# Patient Record
Sex: FEMALE | Race: WHITE | NOT HISPANIC OR LATINO | Employment: OTHER | URBAN - METROPOLITAN AREA
[De-identification: names, ages, dates, MRNs, and addresses within clinical notes are randomized per-mention and may not be internally consistent; named-entity substitution may affect disease eponyms.]

---

## 2017-02-02 ENCOUNTER — HOSPITAL ENCOUNTER (OUTPATIENT)
Dept: LAB | Facility: MEDICAL CENTER | Age: 67
End: 2017-02-02
Attending: FAMILY MEDICINE
Payer: COMMERCIAL

## 2017-02-02 LAB
ALBUMIN SERPL BCP-MCNC: 4.2 G/DL (ref 3.2–4.9)
ALBUMIN/GLOB SERPL: 1.6 G/DL
ALP SERPL-CCNC: 68 U/L (ref 30–99)
ALT SERPL-CCNC: 16 U/L (ref 2–50)
ANION GAP SERPL CALC-SCNC: 4 MMOL/L (ref 0–11.9)
AST SERPL-CCNC: 17 U/L (ref 12–45)
BASOPHILS # BLD AUTO: 0.02 K/UL (ref 0–0.12)
BASOPHILS NFR BLD AUTO: 0.4 % (ref 0–1.8)
BILIRUB SERPL-MCNC: 1.2 MG/DL (ref 0.1–1.5)
BUN SERPL-MCNC: 18 MG/DL (ref 8–22)
CALCIUM SERPL-MCNC: 9.6 MG/DL (ref 8.5–10.5)
CHLORIDE SERPL-SCNC: 103 MMOL/L (ref 96–112)
CHOLEST SERPL-MCNC: 142 MG/DL (ref 100–199)
CO2 SERPL-SCNC: 30 MMOL/L (ref 20–33)
CREAT SERPL-MCNC: 0.72 MG/DL (ref 0.5–1.4)
EOSINOPHIL # BLD: 0.08 K/UL (ref 0–0.51)
EOSINOPHIL NFR BLD AUTO: 1.6 % (ref 0–6.9)
ERYTHROCYTE [DISTWIDTH] IN BLOOD BY AUTOMATED COUNT: 47.8 FL (ref 35.9–50)
EST. AVERAGE GLUCOSE BLD GHB EST-MCNC: 108 MG/DL
GLOBULIN SER CALC-MCNC: 2.6 G/DL (ref 1.9–3.5)
GLUCOSE SERPL-MCNC: 101 MG/DL (ref 65–99)
HBA1C MFR BLD: 5.4 % (ref 0–5.6)
HCT VFR BLD AUTO: 45.1 % (ref 37–47)
HDLC SERPL-MCNC: 51 MG/DL
HGB BLD-MCNC: 14.6 G/DL (ref 12–16)
IMM GRANULOCYTES # BLD AUTO: 0.01 K/UL (ref 0–0.11)
IMM GRANULOCYTES NFR BLD AUTO: 0.2 % (ref 0–0.9)
LDLC SERPL CALC-MCNC: 74 MG/DL
LYMPHOCYTES # BLD: 1.49 K/UL (ref 1–4.8)
LYMPHOCYTES NFR BLD AUTO: 30.2 % (ref 22–41)
MCH RBC QN AUTO: 33.1 PG (ref 27–33)
MCHC RBC AUTO-ENTMCNC: 32.4 G/DL (ref 33.6–35)
MCV RBC AUTO: 102.3 FL (ref 81.4–97.8)
MONOCYTES # BLD: 0.34 K/UL (ref 0–0.85)
MONOCYTES NFR BLD AUTO: 6.9 % (ref 0–13.4)
NEUTROPHILS # BLD: 2.99 K/UL (ref 2–7.15)
NEUTROPHILS NFR BLD AUTO: 60.7 % (ref 44–72)
NRBC # BLD AUTO: 0 K/UL
NRBC BLD-RTO: 0 /100 WBC
PLATELET # BLD AUTO: 177 K/UL (ref 164–446)
PMV BLD AUTO: 11.2 FL (ref 9–12.9)
POTASSIUM SERPL-SCNC: 4.2 MMOL/L (ref 3.6–5.5)
PROT SERPL-MCNC: 6.8 G/DL (ref 6–8.2)
RBC # BLD AUTO: 4.41 M/UL (ref 4.2–5.4)
SODIUM SERPL-SCNC: 137 MMOL/L (ref 135–145)
TRIGL SERPL-MCNC: 86 MG/DL (ref 0–149)
URATE SERPL-MCNC: 6.3 MG/DL (ref 1.9–8.2)
WBC # BLD AUTO: 4.9 K/UL (ref 4.8–10.8)

## 2017-02-02 PROCEDURE — 80053 COMPREHEN METABOLIC PANEL: CPT

## 2017-02-02 PROCEDURE — 36415 COLL VENOUS BLD VENIPUNCTURE: CPT

## 2017-02-02 PROCEDURE — 85025 COMPLETE CBC W/AUTO DIFF WBC: CPT

## 2017-02-02 PROCEDURE — 83036 HEMOGLOBIN GLYCOSYLATED A1C: CPT

## 2017-02-02 PROCEDURE — 80061 LIPID PANEL: CPT

## 2017-02-02 PROCEDURE — 84550 ASSAY OF BLOOD/URIC ACID: CPT

## 2017-06-30 ENCOUNTER — HOSPITAL ENCOUNTER (OUTPATIENT)
Dept: LAB | Facility: MEDICAL CENTER | Age: 67
End: 2017-06-30
Attending: FAMILY MEDICINE
Payer: COMMERCIAL

## 2017-06-30 LAB
ALBUMIN SERPL BCP-MCNC: 4.1 G/DL (ref 3.2–4.9)
ALBUMIN/GLOB SERPL: 1.5 G/DL
ALP SERPL-CCNC: 77 U/L (ref 30–99)
ALT SERPL-CCNC: 22 U/L (ref 2–50)
ANION GAP SERPL CALC-SCNC: 7 MMOL/L (ref 0–11.9)
AST SERPL-CCNC: 24 U/L (ref 12–45)
BILIRUB SERPL-MCNC: 2.2 MG/DL (ref 0.1–1.5)
BUN SERPL-MCNC: 13 MG/DL (ref 8–22)
CALCIUM SERPL-MCNC: 9.8 MG/DL (ref 8.5–10.5)
CHLORIDE SERPL-SCNC: 104 MMOL/L (ref 96–112)
CHOLEST SERPL-MCNC: 146 MG/DL (ref 100–199)
CO2 SERPL-SCNC: 30 MMOL/L (ref 20–33)
CREAT SERPL-MCNC: 0.8 MG/DL (ref 0.5–1.4)
EST. AVERAGE GLUCOSE BLD GHB EST-MCNC: 108 MG/DL
GFR SERPL CREATININE-BSD FRML MDRD: >60 ML/MIN/1.73 M 2
GLOBULIN SER CALC-MCNC: 2.8 G/DL (ref 1.9–3.5)
GLUCOSE SERPL-MCNC: 91 MG/DL (ref 65–99)
HBA1C MFR BLD: 5.4 % (ref 0–5.6)
HDLC SERPL-MCNC: 51 MG/DL
LDLC SERPL CALC-MCNC: 74 MG/DL
POTASSIUM SERPL-SCNC: 4.2 MMOL/L (ref 3.6–5.5)
PROT SERPL-MCNC: 6.9 G/DL (ref 6–8.2)
SODIUM SERPL-SCNC: 141 MMOL/L (ref 135–145)
TRIGL SERPL-MCNC: 107 MG/DL (ref 0–149)

## 2017-06-30 PROCEDURE — 83036 HEMOGLOBIN GLYCOSYLATED A1C: CPT

## 2017-06-30 PROCEDURE — 80053 COMPREHEN METABOLIC PANEL: CPT

## 2017-06-30 PROCEDURE — 36415 COLL VENOUS BLD VENIPUNCTURE: CPT

## 2017-06-30 PROCEDURE — 80061 LIPID PANEL: CPT

## 2017-09-26 ENCOUNTER — APPOINTMENT (OUTPATIENT)
Dept: SOCIAL WORK | Facility: CLINIC | Age: 67
End: 2017-09-26

## 2017-09-26 PROCEDURE — 90662 IIV NO PRSV INCREASED AG IM: CPT | Performed by: REGISTERED NURSE

## 2017-11-17 ENCOUNTER — HOSPITAL ENCOUNTER (OUTPATIENT)
Dept: RADIOLOGY | Facility: MEDICAL CENTER | Age: 67
End: 2017-11-17
Attending: FAMILY MEDICINE
Payer: COMMERCIAL

## 2017-11-17 DIAGNOSIS — Z12.39 SCREENING FOR MALIGNANT NEOPLASM OF BREAST: ICD-10-CM

## 2017-11-17 PROCEDURE — G0202 SCR MAMMO BI INCL CAD: HCPCS

## 2018-01-24 ENCOUNTER — HOSPITAL ENCOUNTER (OUTPATIENT)
Dept: LAB | Facility: MEDICAL CENTER | Age: 68
End: 2018-01-24
Attending: FAMILY MEDICINE
Payer: COMMERCIAL

## 2018-01-24 LAB
BASOPHILS # BLD AUTO: 0.5 % (ref 0–1.8)
BASOPHILS # BLD: 0.03 K/UL (ref 0–0.12)
EOSINOPHIL # BLD AUTO: 0.16 K/UL (ref 0–0.51)
EOSINOPHIL NFR BLD: 2.4 % (ref 0–6.9)
ERYTHROCYTE [DISTWIDTH] IN BLOOD BY AUTOMATED COUNT: 46.6 FL (ref 35.9–50)
EST. AVERAGE GLUCOSE BLD GHB EST-MCNC: 123 MG/DL
HBA1C MFR BLD: 5.9 % (ref 0–5.6)
HCT VFR BLD AUTO: 46.6 % (ref 37–47)
HGB BLD-MCNC: 15.6 G/DL (ref 12–16)
IMM GRANULOCYTES # BLD AUTO: 0.02 K/UL (ref 0–0.11)
IMM GRANULOCYTES NFR BLD AUTO: 0.3 % (ref 0–0.9)
LYMPHOCYTES # BLD AUTO: 1.84 K/UL (ref 1–4.8)
LYMPHOCYTES NFR BLD: 28 % (ref 22–41)
MCH RBC QN AUTO: 32.7 PG (ref 27–33)
MCHC RBC AUTO-ENTMCNC: 33.5 G/DL (ref 33.6–35)
MCV RBC AUTO: 97.7 FL (ref 81.4–97.8)
MONOCYTES # BLD AUTO: 0.45 K/UL (ref 0–0.85)
MONOCYTES NFR BLD AUTO: 6.8 % (ref 0–13.4)
NEUTROPHILS # BLD AUTO: 4.08 K/UL (ref 2–7.15)
NEUTROPHILS NFR BLD: 62 % (ref 44–72)
NRBC # BLD AUTO: 0 K/UL
NRBC BLD-RTO: 0 /100 WBC
PLATELET # BLD AUTO: 212 K/UL (ref 164–446)
PMV BLD AUTO: 10.3 FL (ref 9–12.9)
RBC # BLD AUTO: 4.77 M/UL (ref 4.2–5.4)
WBC # BLD AUTO: 6.6 K/UL (ref 4.8–10.8)

## 2018-01-24 PROCEDURE — 80053 COMPREHEN METABOLIC PANEL: CPT

## 2018-01-24 PROCEDURE — 85025 COMPLETE CBC W/AUTO DIFF WBC: CPT

## 2018-01-24 PROCEDURE — 36415 COLL VENOUS BLD VENIPUNCTURE: CPT

## 2018-01-24 PROCEDURE — 80061 LIPID PANEL: CPT

## 2018-01-24 PROCEDURE — 83036 HEMOGLOBIN GLYCOSYLATED A1C: CPT

## 2018-01-25 LAB
ALBUMIN SERPL BCP-MCNC: 3.8 G/DL (ref 3.2–4.9)
ALBUMIN/GLOB SERPL: 1.5 G/DL
ALP SERPL-CCNC: 59 U/L (ref 30–99)
ALT SERPL-CCNC: 23 U/L (ref 2–50)
ANION GAP SERPL CALC-SCNC: 10 MMOL/L (ref 0–11.9)
AST SERPL-CCNC: 30 U/L (ref 12–45)
BILIRUB SERPL-MCNC: 1.5 MG/DL (ref 0.1–1.5)
BUN SERPL-MCNC: 19 MG/DL (ref 8–22)
CALCIUM SERPL-MCNC: 9.2 MG/DL (ref 8.4–10.2)
CHLORIDE SERPL-SCNC: 106 MMOL/L (ref 96–112)
CHOLEST SERPL-MCNC: 186 MG/DL (ref 100–199)
CO2 SERPL-SCNC: 25 MMOL/L (ref 20–33)
CREAT SERPL-MCNC: 0.9 MG/DL (ref 0.5–1.4)
GLOBULIN SER CALC-MCNC: 2.6 G/DL (ref 1.9–3.5)
GLUCOSE SERPL-MCNC: 96 MG/DL (ref 65–99)
HDLC SERPL-MCNC: 54 MG/DL
LDLC SERPL CALC-MCNC: 104 MG/DL
POTASSIUM SERPL-SCNC: 4.2 MMOL/L (ref 3.6–5.5)
PROT SERPL-MCNC: 6.4 G/DL (ref 6–8.2)
SODIUM SERPL-SCNC: 141 MMOL/L (ref 135–145)
TRIGL SERPL-MCNC: 138 MG/DL (ref 0–149)

## 2018-03-21 ENCOUNTER — HOSPITAL ENCOUNTER (OUTPATIENT)
Dept: RADIOLOGY | Facility: MEDICAL CENTER | Age: 68
End: 2018-03-21
Attending: SPECIALIST
Payer: COMMERCIAL

## 2018-03-21 ENCOUNTER — HOSPITAL ENCOUNTER (OUTPATIENT)
Dept: LAB | Facility: MEDICAL CENTER | Age: 68
End: 2018-03-21
Attending: SPECIALIST
Payer: COMMERCIAL

## 2018-03-21 DIAGNOSIS — M48.02 SPINAL STENOSIS IN CERVICAL REGION: ICD-10-CM

## 2018-03-21 LAB
ALBUMIN SERPL BCP-MCNC: 4 G/DL (ref 3.2–4.9)
ALBUMIN/GLOB SERPL: 1.5 G/DL
ALP SERPL-CCNC: 60 U/L (ref 30–99)
ALT SERPL-CCNC: 23 U/L (ref 2–50)
ANION GAP SERPL CALC-SCNC: 8 MMOL/L (ref 0–11.9)
AST SERPL-CCNC: 25 U/L (ref 12–45)
BASOPHILS # BLD AUTO: 0.4 % (ref 0–1.8)
BASOPHILS # BLD: 0.03 K/UL (ref 0–0.12)
BILIRUB SERPL-MCNC: 1.1 MG/DL (ref 0.1–1.5)
BUN SERPL-MCNC: 32 MG/DL (ref 8–22)
CALCIUM SERPL-MCNC: 9.4 MG/DL (ref 8.5–10.5)
CHLORIDE SERPL-SCNC: 108 MMOL/L (ref 96–112)
CO2 SERPL-SCNC: 25 MMOL/L (ref 20–33)
CREAT SERPL-MCNC: 0.8 MG/DL (ref 0.5–1.4)
EOSINOPHIL # BLD AUTO: 0.14 K/UL (ref 0–0.51)
EOSINOPHIL NFR BLD: 1.8 % (ref 0–6.9)
ERYTHROCYTE [DISTWIDTH] IN BLOOD BY AUTOMATED COUNT: 48.6 FL (ref 35.9–50)
ERYTHROCYTE [SEDIMENTATION RATE] IN BLOOD BY WESTERGREN METHOD: 8 MM/HOUR (ref 0–30)
GLOBULIN SER CALC-MCNC: 2.7 G/DL (ref 1.9–3.5)
GLUCOSE SERPL-MCNC: 115 MG/DL (ref 65–99)
HCT VFR BLD AUTO: 48.3 % (ref 37–47)
HGB BLD-MCNC: 16.1 G/DL (ref 12–16)
IMM GRANULOCYTES # BLD AUTO: 0.01 K/UL (ref 0–0.11)
IMM GRANULOCYTES NFR BLD AUTO: 0.1 % (ref 0–0.9)
LYMPHOCYTES # BLD AUTO: 1.82 K/UL (ref 1–4.8)
LYMPHOCYTES NFR BLD: 22.9 % (ref 22–41)
MCH RBC QN AUTO: 32.8 PG (ref 27–33)
MCHC RBC AUTO-ENTMCNC: 33.3 G/DL (ref 33.6–35)
MCV RBC AUTO: 98.4 FL (ref 81.4–97.8)
MONOCYTES # BLD AUTO: 0.57 K/UL (ref 0–0.85)
MONOCYTES NFR BLD AUTO: 7.2 % (ref 0–13.4)
NEUTROPHILS # BLD AUTO: 5.37 K/UL (ref 2–7.15)
NEUTROPHILS NFR BLD: 67.6 % (ref 44–72)
NRBC # BLD AUTO: 0 K/UL
NRBC BLD-RTO: 0 /100 WBC
PLATELET # BLD AUTO: 227 K/UL (ref 164–446)
PMV BLD AUTO: 10.8 FL (ref 9–12.9)
POTASSIUM SERPL-SCNC: 4.2 MMOL/L (ref 3.6–5.5)
PROT SERPL-MCNC: 6.7 G/DL (ref 6–8.2)
RBC # BLD AUTO: 4.91 M/UL (ref 4.2–5.4)
SODIUM SERPL-SCNC: 141 MMOL/L (ref 135–145)
WBC # BLD AUTO: 7.9 K/UL (ref 4.8–10.8)

## 2018-03-21 PROCEDURE — 80053 COMPREHEN METABOLIC PANEL: CPT

## 2018-03-21 PROCEDURE — 36415 COLL VENOUS BLD VENIPUNCTURE: CPT

## 2018-03-21 PROCEDURE — 85652 RBC SED RATE AUTOMATED: CPT

## 2018-03-21 PROCEDURE — 700117 HCHG RX CONTRAST REV CODE 255: Performed by: SPECIALIST

## 2018-03-21 PROCEDURE — A9579 GAD-BASE MR CONTRAST NOS,1ML: HCPCS | Performed by: SPECIALIST

## 2018-03-21 PROCEDURE — 72156 MRI NECK SPINE W/O & W/DYE: CPT

## 2018-03-21 PROCEDURE — 85025 COMPLETE CBC W/AUTO DIFF WBC: CPT

## 2018-03-21 RX ADMIN — GADODIAMIDE 20 ML: 287 INJECTION INTRAVENOUS at 14:06

## 2018-08-07 ENCOUNTER — HOSPITAL ENCOUNTER (OUTPATIENT)
Dept: LAB | Facility: MEDICAL CENTER | Age: 68
End: 2018-08-07
Attending: FAMILY MEDICINE
Payer: COMMERCIAL

## 2018-08-07 LAB
ALBUMIN SERPL BCP-MCNC: 4.2 G/DL (ref 3.2–4.9)
ALBUMIN/GLOB SERPL: 1.7 G/DL
ALP SERPL-CCNC: 66 U/L (ref 30–99)
ALT SERPL-CCNC: 26 U/L (ref 2–50)
ANION GAP SERPL CALC-SCNC: 10 MMOL/L (ref 0–11.9)
AST SERPL-CCNC: 26 U/L (ref 12–45)
BILIRUB SERPL-MCNC: 1.6 MG/DL (ref 0.1–1.5)
BUN SERPL-MCNC: 15 MG/DL (ref 8–22)
CALCIUM SERPL-MCNC: 9.1 MG/DL (ref 8.5–10.5)
CHLORIDE SERPL-SCNC: 104 MMOL/L (ref 96–112)
CHOLEST SERPL-MCNC: 152 MG/DL (ref 100–199)
CO2 SERPL-SCNC: 26 MMOL/L (ref 20–33)
CREAT SERPL-MCNC: 0.72 MG/DL (ref 0.5–1.4)
EST. AVERAGE GLUCOSE BLD GHB EST-MCNC: 123 MG/DL
GLOBULIN SER CALC-MCNC: 2.5 G/DL (ref 1.9–3.5)
GLUCOSE SERPL-MCNC: 90 MG/DL (ref 65–99)
HBA1C MFR BLD: 5.9 % (ref 0–5.6)
HDLC SERPL-MCNC: 50 MG/DL
LDLC SERPL CALC-MCNC: 73 MG/DL
POTASSIUM SERPL-SCNC: 3.6 MMOL/L (ref 3.6–5.5)
PROT SERPL-MCNC: 6.7 G/DL (ref 6–8.2)
SODIUM SERPL-SCNC: 140 MMOL/L (ref 135–145)
TRIGL SERPL-MCNC: 143 MG/DL (ref 0–149)

## 2018-08-07 PROCEDURE — 83036 HEMOGLOBIN GLYCOSYLATED A1C: CPT

## 2018-08-07 PROCEDURE — 80061 LIPID PANEL: CPT

## 2018-08-07 PROCEDURE — 36415 COLL VENOUS BLD VENIPUNCTURE: CPT

## 2018-08-07 PROCEDURE — 80053 COMPREHEN METABOLIC PANEL: CPT

## 2018-09-19 ENCOUNTER — IMMUNIZATION (OUTPATIENT)
Dept: OCCUPATIONAL MEDICINE | Facility: CLINIC | Age: 68
End: 2018-09-19

## 2018-09-19 DIAGNOSIS — Z23 NEED FOR VACCINATION: ICD-10-CM

## 2018-09-19 PROCEDURE — 90686 IIV4 VACC NO PRSV 0.5 ML IM: CPT | Performed by: PREVENTIVE MEDICINE

## 2018-12-15 ENCOUNTER — HOSPITAL ENCOUNTER (OUTPATIENT)
Dept: LAB | Facility: MEDICAL CENTER | Age: 68
End: 2018-12-15
Attending: FAMILY MEDICINE
Payer: COMMERCIAL

## 2018-12-15 LAB
BASOPHILS # BLD AUTO: 0.3 % (ref 0–1.8)
BASOPHILS # BLD: 0.02 K/UL (ref 0–0.12)
EOSINOPHIL # BLD AUTO: 0.14 K/UL (ref 0–0.51)
EOSINOPHIL NFR BLD: 2.2 % (ref 0–6.9)
ERYTHROCYTE [DISTWIDTH] IN BLOOD BY AUTOMATED COUNT: 47.5 FL (ref 35.9–50)
HCT VFR BLD AUTO: 48.6 % (ref 37–47)
HGB BLD-MCNC: 16.1 G/DL (ref 12–16)
IMM GRANULOCYTES # BLD AUTO: 0.02 K/UL (ref 0–0.11)
IMM GRANULOCYTES NFR BLD AUTO: 0.3 % (ref 0–0.9)
LYMPHOCYTES # BLD AUTO: 1.87 K/UL (ref 1–4.8)
LYMPHOCYTES NFR BLD: 29.5 % (ref 22–41)
MCH RBC QN AUTO: 32.1 PG (ref 27–33)
MCHC RBC AUTO-ENTMCNC: 33.1 G/DL (ref 33.6–35)
MCV RBC AUTO: 96.8 FL (ref 81.4–97.8)
MONOCYTES # BLD AUTO: 0.5 K/UL (ref 0–0.85)
MONOCYTES NFR BLD AUTO: 7.9 % (ref 0–13.4)
NEUTROPHILS # BLD AUTO: 3.79 K/UL (ref 2–7.15)
NEUTROPHILS NFR BLD: 59.8 % (ref 44–72)
NRBC # BLD AUTO: 0 K/UL
NRBC BLD-RTO: 0 /100 WBC
PLATELET # BLD AUTO: 213 K/UL (ref 164–446)
PMV BLD AUTO: 11.3 FL (ref 9–12.9)
RBC # BLD AUTO: 5.02 M/UL (ref 4.2–5.4)
WBC # BLD AUTO: 6.3 K/UL (ref 4.8–10.8)

## 2018-12-15 PROCEDURE — 80061 LIPID PANEL: CPT

## 2018-12-15 PROCEDURE — 85025 COMPLETE CBC W/AUTO DIFF WBC: CPT

## 2018-12-15 PROCEDURE — 36415 COLL VENOUS BLD VENIPUNCTURE: CPT

## 2018-12-15 PROCEDURE — 83036 HEMOGLOBIN GLYCOSYLATED A1C: CPT

## 2018-12-15 PROCEDURE — 80053 COMPREHEN METABOLIC PANEL: CPT

## 2018-12-15 PROCEDURE — 84550 ASSAY OF BLOOD/URIC ACID: CPT

## 2018-12-16 LAB
ALBUMIN SERPL BCP-MCNC: 4.1 G/DL (ref 3.2–4.9)
ALBUMIN/GLOB SERPL: 1.6 G/DL
ALP SERPL-CCNC: 70 U/L (ref 30–99)
ALT SERPL-CCNC: 21 U/L (ref 2–50)
ANION GAP SERPL CALC-SCNC: 8 MMOL/L (ref 0–11.9)
AST SERPL-CCNC: 22 U/L (ref 12–45)
BILIRUB SERPL-MCNC: 1.9 MG/DL (ref 0.1–1.5)
BUN SERPL-MCNC: 13 MG/DL (ref 8–22)
CALCIUM SERPL-MCNC: 9.7 MG/DL (ref 8.5–10.5)
CHLORIDE SERPL-SCNC: 103 MMOL/L (ref 96–112)
CHOLEST SERPL-MCNC: 129 MG/DL (ref 100–199)
CO2 SERPL-SCNC: 28 MMOL/L (ref 20–33)
CREAT SERPL-MCNC: 0.83 MG/DL (ref 0.5–1.4)
FASTING STATUS PATIENT QL REPORTED: NORMAL
GLOBULIN SER CALC-MCNC: 2.5 G/DL (ref 1.9–3.5)
GLUCOSE SERPL-MCNC: 90 MG/DL (ref 65–99)
HDLC SERPL-MCNC: 47 MG/DL
LDLC SERPL CALC-MCNC: 60 MG/DL
POTASSIUM SERPL-SCNC: 3.7 MMOL/L (ref 3.6–5.5)
PROT SERPL-MCNC: 6.6 G/DL (ref 6–8.2)
SODIUM SERPL-SCNC: 139 MMOL/L (ref 135–145)
TRIGL SERPL-MCNC: 109 MG/DL (ref 0–149)
URATE SERPL-MCNC: 7.9 MG/DL (ref 1.9–8.2)

## 2018-12-18 ENCOUNTER — HOSPITAL ENCOUNTER (OUTPATIENT)
Dept: RADIOLOGY | Facility: MEDICAL CENTER | Age: 68
End: 2018-12-18
Attending: FAMILY MEDICINE
Payer: COMMERCIAL

## 2018-12-18 DIAGNOSIS — Z12.31 VISIT FOR SCREENING MAMMOGRAM: ICD-10-CM

## 2018-12-18 LAB
EST. AVERAGE GLUCOSE BLD GHB EST-MCNC: 134 MG/DL
HBA1C MFR BLD: 6.3 % (ref 0–5.6)

## 2018-12-18 PROCEDURE — 77067 SCR MAMMO BI INCL CAD: CPT

## 2019-01-10 ENCOUNTER — HOSPITAL ENCOUNTER (OUTPATIENT)
Dept: RADIOLOGY | Facility: MEDICAL CENTER | Age: 69
End: 2019-01-10
Attending: FAMILY MEDICINE
Payer: MEDICARE

## 2019-01-10 DIAGNOSIS — I65.23 BILATERAL CAROTID ARTERY STENOSIS: ICD-10-CM

## 2019-01-10 PROCEDURE — 93880 EXTRACRANIAL BILAT STUDY: CPT

## 2019-03-27 ENCOUNTER — HOSPITAL ENCOUNTER (OUTPATIENT)
Dept: LAB | Facility: MEDICAL CENTER | Age: 69
End: 2019-03-27
Attending: FAMILY MEDICINE
Payer: MEDICARE

## 2019-03-27 LAB
ALBUMIN SERPL BCP-MCNC: 4 G/DL (ref 3.2–4.9)
ALBUMIN/GLOB SERPL: 1.3 G/DL
ALP SERPL-CCNC: 63 U/L (ref 30–99)
ALT SERPL-CCNC: 20 U/L (ref 2–50)
ANION GAP SERPL CALC-SCNC: 9 MMOL/L (ref 0–11.9)
AST SERPL-CCNC: 22 U/L (ref 12–45)
BILIRUB SERPL-MCNC: 1.6 MG/DL (ref 0.1–1.5)
BUN SERPL-MCNC: 21 MG/DL (ref 8–22)
CALCIUM SERPL-MCNC: 9.3 MG/DL (ref 8.5–10.5)
CHLORIDE SERPL-SCNC: 104 MMOL/L (ref 96–112)
CHOLEST SERPL-MCNC: 158 MG/DL (ref 100–199)
CO2 SERPL-SCNC: 28 MMOL/L (ref 20–33)
CREAT SERPL-MCNC: 0.89 MG/DL (ref 0.5–1.4)
FASTING STATUS PATIENT QL REPORTED: NORMAL
GLOBULIN SER CALC-MCNC: 3.1 G/DL (ref 1.9–3.5)
GLUCOSE SERPL-MCNC: 104 MG/DL (ref 65–99)
HDLC SERPL-MCNC: 46 MG/DL
LDLC SERPL CALC-MCNC: 84 MG/DL
POTASSIUM SERPL-SCNC: 4.1 MMOL/L (ref 3.6–5.5)
PROT SERPL-MCNC: 7.1 G/DL (ref 6–8.2)
SODIUM SERPL-SCNC: 141 MMOL/L (ref 135–145)
TRIGL SERPL-MCNC: 140 MG/DL (ref 0–149)

## 2019-03-27 PROCEDURE — 80053 COMPREHEN METABOLIC PANEL: CPT

## 2019-03-27 PROCEDURE — 36415 COLL VENOUS BLD VENIPUNCTURE: CPT

## 2019-03-27 PROCEDURE — 83036 HEMOGLOBIN GLYCOSYLATED A1C: CPT | Mod: GA

## 2019-03-27 PROCEDURE — 80061 LIPID PANEL: CPT

## 2019-03-29 LAB
EST. AVERAGE GLUCOSE BLD GHB EST-MCNC: 126 MG/DL
HBA1C MFR BLD: 6 % (ref 0–5.6)

## 2019-04-09 ENCOUNTER — HOSPITAL ENCOUNTER (OUTPATIENT)
Dept: RADIOLOGY | Facility: MEDICAL CENTER | Age: 69
End: 2019-04-09
Attending: FAMILY MEDICINE
Payer: MEDICARE

## 2019-04-09 DIAGNOSIS — M25.551 RIGHT HIP PAIN: ICD-10-CM

## 2019-04-09 DIAGNOSIS — R10.31 RIGHT INGUINAL PAIN: ICD-10-CM

## 2019-04-09 PROCEDURE — 73562 X-RAY EXAM OF KNEE 3: CPT | Mod: RT

## 2019-04-09 PROCEDURE — 73502 X-RAY EXAM HIP UNI 2-3 VIEWS: CPT | Mod: RT

## 2019-06-06 ENCOUNTER — HOSPITAL ENCOUNTER (OUTPATIENT)
Dept: LAB | Facility: MEDICAL CENTER | Age: 69
End: 2019-06-06
Attending: FAMILY MEDICINE
Payer: MEDICARE

## 2019-06-06 LAB
BASOPHILS # BLD AUTO: 0.6 % (ref 0–1.8)
BASOPHILS # BLD: 0.04 K/UL (ref 0–0.12)
CRP SERPL HS-MCNC: 0.31 MG/DL (ref 0–0.75)
EOSINOPHIL # BLD AUTO: 0.16 K/UL (ref 0–0.51)
EOSINOPHIL NFR BLD: 2.5 % (ref 0–6.9)
ERYTHROCYTE [DISTWIDTH] IN BLOOD BY AUTOMATED COUNT: 48.7 FL (ref 35.9–50)
ERYTHROCYTE [SEDIMENTATION RATE] IN BLOOD BY WESTERGREN METHOD: 9 MM/HOUR (ref 0–30)
HCT VFR BLD AUTO: 46.8 % (ref 37–47)
HGB BLD-MCNC: 16 G/DL (ref 12–16)
IMM GRANULOCYTES # BLD AUTO: 0.01 K/UL (ref 0–0.11)
IMM GRANULOCYTES NFR BLD AUTO: 0.2 % (ref 0–0.9)
LYMPHOCYTES # BLD AUTO: 1.78 K/UL (ref 1–4.8)
LYMPHOCYTES NFR BLD: 28 % (ref 22–41)
MCH RBC QN AUTO: 33.3 PG (ref 27–33)
MCHC RBC AUTO-ENTMCNC: 34.2 G/DL (ref 33.6–35)
MCV RBC AUTO: 97.3 FL (ref 81.4–97.8)
MONOCYTES # BLD AUTO: 0.44 K/UL (ref 0–0.85)
MONOCYTES NFR BLD AUTO: 6.9 % (ref 0–13.4)
NEUTROPHILS # BLD AUTO: 3.93 K/UL (ref 2–7.15)
NEUTROPHILS NFR BLD: 61.8 % (ref 44–72)
NRBC # BLD AUTO: 0 K/UL
NRBC BLD-RTO: 0 /100 WBC
PLATELET # BLD AUTO: 221 K/UL (ref 164–446)
PMV BLD AUTO: 10.9 FL (ref 9–12.9)
RBC # BLD AUTO: 4.81 M/UL (ref 4.2–5.4)
WBC # BLD AUTO: 6.4 K/UL (ref 4.8–10.8)

## 2019-06-06 PROCEDURE — 85652 RBC SED RATE AUTOMATED: CPT

## 2019-06-06 PROCEDURE — 86140 C-REACTIVE PROTEIN: CPT

## 2019-06-06 PROCEDURE — 85025 COMPLETE CBC W/AUTO DIFF WBC: CPT

## 2019-06-06 PROCEDURE — 36415 COLL VENOUS BLD VENIPUNCTURE: CPT

## 2019-06-12 ENCOUNTER — HOSPITAL ENCOUNTER (OUTPATIENT)
Dept: RADIOLOGY | Facility: MEDICAL CENTER | Age: 69
End: 2019-06-12
Attending: FAMILY MEDICINE
Payer: MEDICARE

## 2019-06-12 DIAGNOSIS — N64.59 BREAST SIGNS AND SYMPTOMS: ICD-10-CM

## 2019-06-12 PROCEDURE — G0279 TOMOSYNTHESIS, MAMMO: HCPCS | Mod: RT

## 2019-06-25 ENCOUNTER — HOSPITAL ENCOUNTER (OUTPATIENT)
Dept: LAB | Facility: MEDICAL CENTER | Age: 69
End: 2019-06-25
Attending: FAMILY MEDICINE
Payer: MEDICARE

## 2019-06-25 LAB
ALBUMIN SERPL BCP-MCNC: 4.1 G/DL (ref 3.2–4.9)
ALBUMIN/GLOB SERPL: 1.6 G/DL
ALP SERPL-CCNC: 64 U/L (ref 30–99)
ALT SERPL-CCNC: 17 U/L (ref 2–50)
ANION GAP SERPL CALC-SCNC: 6 MMOL/L (ref 0–11.9)
AST SERPL-CCNC: 22 U/L (ref 12–45)
BILIRUB SERPL-MCNC: 1.5 MG/DL (ref 0.1–1.5)
BUN SERPL-MCNC: 16 MG/DL (ref 8–22)
CALCIUM SERPL-MCNC: 9.5 MG/DL (ref 8.5–10.5)
CHLORIDE SERPL-SCNC: 107 MMOL/L (ref 96–112)
CHOLEST SERPL-MCNC: 139 MG/DL (ref 100–199)
CO2 SERPL-SCNC: 27 MMOL/L (ref 20–33)
CREAT SERPL-MCNC: 0.78 MG/DL (ref 0.5–1.4)
EST. AVERAGE GLUCOSE BLD GHB EST-MCNC: 111 MG/DL
FASTING STATUS PATIENT QL REPORTED: NORMAL
GLOBULIN SER CALC-MCNC: 2.5 G/DL (ref 1.9–3.5)
GLUCOSE SERPL-MCNC: 98 MG/DL (ref 65–99)
HBA1C MFR BLD: 5.5 % (ref 0–5.6)
HDLC SERPL-MCNC: 39 MG/DL
LDLC SERPL CALC-MCNC: 75 MG/DL
POTASSIUM SERPL-SCNC: 3.9 MMOL/L (ref 3.6–5.5)
PROT SERPL-MCNC: 6.6 G/DL (ref 6–8.2)
SODIUM SERPL-SCNC: 140 MMOL/L (ref 135–145)
TRIGL SERPL-MCNC: 127 MG/DL (ref 0–149)
TSH SERPL DL<=0.005 MIU/L-ACNC: 1.04 UIU/ML (ref 0.38–5.33)

## 2019-06-25 PROCEDURE — 84443 ASSAY THYROID STIM HORMONE: CPT

## 2019-06-25 PROCEDURE — 80061 LIPID PANEL: CPT

## 2019-06-25 PROCEDURE — 83036 HEMOGLOBIN GLYCOSYLATED A1C: CPT | Mod: GA

## 2019-06-25 PROCEDURE — 80053 COMPREHEN METABOLIC PANEL: CPT

## 2019-06-25 PROCEDURE — 36415 COLL VENOUS BLD VENIPUNCTURE: CPT

## 2019-10-05 ENCOUNTER — HOSPITAL ENCOUNTER (OUTPATIENT)
Dept: LAB | Facility: MEDICAL CENTER | Age: 69
End: 2019-10-05
Attending: FAMILY MEDICINE
Payer: MEDICARE

## 2019-10-05 LAB
ALBUMIN SERPL BCP-MCNC: 4.3 G/DL (ref 3.2–4.9)
ALBUMIN/GLOB SERPL: 1.6 G/DL
ALP SERPL-CCNC: 63 U/L (ref 30–99)
ALT SERPL-CCNC: 23 U/L (ref 2–50)
ANION GAP SERPL CALC-SCNC: 9 MMOL/L (ref 0–11.9)
AST SERPL-CCNC: 26 U/L (ref 12–45)
BILIRUB SERPL-MCNC: 1.6 MG/DL (ref 0.1–1.5)
BUN SERPL-MCNC: 25 MG/DL (ref 8–22)
CALCIUM SERPL-MCNC: 9.8 MG/DL (ref 8.5–10.5)
CHLORIDE SERPL-SCNC: 105 MMOL/L (ref 96–112)
CHOLEST SERPL-MCNC: 178 MG/DL (ref 100–199)
CO2 SERPL-SCNC: 25 MMOL/L (ref 20–33)
CREAT SERPL-MCNC: 0.75 MG/DL (ref 0.5–1.4)
EST. AVERAGE GLUCOSE BLD GHB EST-MCNC: 120 MG/DL
GLOBULIN SER CALC-MCNC: 2.7 G/DL (ref 1.9–3.5)
GLUCOSE SERPL-MCNC: 85 MG/DL (ref 65–99)
HBA1C MFR BLD: 5.8 % (ref 0–5.6)
HDLC SERPL-MCNC: 46 MG/DL
LDLC SERPL CALC-MCNC: 100 MG/DL
POTASSIUM SERPL-SCNC: 3.8 MMOL/L (ref 3.6–5.5)
PROT SERPL-MCNC: 7 G/DL (ref 6–8.2)
SODIUM SERPL-SCNC: 139 MMOL/L (ref 135–145)
TRIGL SERPL-MCNC: 162 MG/DL (ref 0–149)

## 2019-10-05 PROCEDURE — 80061 LIPID PANEL: CPT

## 2019-10-05 PROCEDURE — 80053 COMPREHEN METABOLIC PANEL: CPT

## 2019-10-05 PROCEDURE — 36415 COLL VENOUS BLD VENIPUNCTURE: CPT

## 2019-10-05 PROCEDURE — 83036 HEMOGLOBIN GLYCOSYLATED A1C: CPT

## 2019-10-22 ENCOUNTER — HOSPITAL ENCOUNTER (OUTPATIENT)
Dept: RADIOLOGY | Facility: MEDICAL CENTER | Age: 69
End: 2019-10-22
Attending: FAMILY MEDICINE
Payer: MEDICARE

## 2019-10-22 DIAGNOSIS — R10.30 INGUINAL PAIN, UNSPECIFIED LATERALITY: ICD-10-CM

## 2019-10-22 DIAGNOSIS — M25.562 LEFT KNEE PAIN, UNSPECIFIED CHRONICITY: ICD-10-CM

## 2019-10-22 PROCEDURE — 72100 X-RAY EXAM L-S SPINE 2/3 VWS: CPT

## 2019-10-22 PROCEDURE — 73562 X-RAY EXAM OF KNEE 3: CPT | Mod: LT

## 2019-10-23 ENCOUNTER — NON-PROVIDER VISIT (OUTPATIENT)
Dept: CARDIOLOGY | Facility: MEDICAL CENTER | Age: 69
End: 2019-10-23
Payer: MEDICARE

## 2019-10-23 ENCOUNTER — OFFICE VISIT (OUTPATIENT)
Dept: CARDIOLOGY | Facility: MEDICAL CENTER | Age: 69
End: 2019-10-23
Payer: MEDICARE

## 2019-10-23 VITALS
SYSTOLIC BLOOD PRESSURE: 148 MMHG | OXYGEN SATURATION: 93 % | WEIGHT: 222 LBS | HEIGHT: 65 IN | HEART RATE: 92 BPM | DIASTOLIC BLOOD PRESSURE: 80 MMHG | BODY MASS INDEX: 36.99 KG/M2

## 2019-10-23 DIAGNOSIS — Z86.79 HISTORY OF SINUS TACHYCARDIA: ICD-10-CM

## 2019-10-23 DIAGNOSIS — R00.1 BRADYCARDIA: ICD-10-CM

## 2019-10-23 DIAGNOSIS — I10 HYPERTENSION, UNSPECIFIED TYPE: ICD-10-CM

## 2019-10-23 LAB — EKG IMPRESSION: NORMAL

## 2019-10-23 PROCEDURE — 93000 ELECTROCARDIOGRAM COMPLETE: CPT | Performed by: INTERNAL MEDICINE

## 2019-10-23 PROCEDURE — 99204 OFFICE O/P NEW MOD 45 MIN: CPT | Performed by: INTERNAL MEDICINE

## 2019-10-23 PROCEDURE — 93224 XTRNL ECG REC UP TO 48 HRS: CPT | Performed by: INTERNAL MEDICINE

## 2019-10-23 RX ORDER — TRANDOLAPRIL TABLETS 4 MG/1
TABLET ORAL
Refills: 0 | COMMUNITY
Start: 2019-07-26 | End: 2023-02-15 | Stop reason: SDUPTHER

## 2019-10-23 RX ORDER — ATORVASTATIN CALCIUM 10 MG/1
TABLET, FILM COATED ORAL
COMMUNITY
Start: 2016-12-19 | End: 2019-10-23

## 2019-10-23 ASSESSMENT — ENCOUNTER SYMPTOMS
HEARTBURN: 0
FEVER: 0
DEPRESSION: 0
PALPITATIONS: 1
EYE DISCHARGE: 0
NERVOUS/ANXIOUS: 0
COUGH: 0
PND: 0
MYALGIAS: 0
BRUISES/BLEEDS EASILY: 0
NAUSEA: 0
HEADACHES: 0
BLURRED VISION: 0
DIZZINESS: 0
CHILLS: 0
SHORTNESS OF BREATH: 0

## 2019-10-23 NOTE — PROGRESS NOTES
"Chief Complaint   Patient presents with   • Tachycardia       Subjective:   Fiona Daniels is a 69 y.o. female who presents today in consultation at the request of Dr. Rodríguez for tendency for rapid heart rate.    This patient is noticed heart rates of 100 at rest or up to 130 with minimal physical activity for about 1 year.  She is minimally bothered by this chief complaint.  She used to walk and hike 2 miles a day but in the last year has not done it due to plantar fasciitis.    She has no history of heart disease.  She is treated for hypertension with low-dose hydrochlorothiazide and ACE inhibitor.    No history of snoring or daytime somnolence.    Recent lab work demonstrates a normal CBC in June 2019 and a TSH more recently which was normal.  She tends to have a borderline high total bilirubin probably due to Guilbert syndrome.    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    Past medical history: Treated hypertension and treated hyperlipidemia.  Most recent LDL cholesterol was 75.  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  Her  has sleep apnea.  No tobacco.  No alcohol abuse.  No illicit drugs.      Office EKG today demonstrates sinus rhythm and a heart rate of 93 and is normal.    Past Medical History:   Diagnosis Date   • Anesthesia     mother  \"dead for a minute\"   • ASTHMA    • Hepatitis A     age 6   • Hypertension      Past Surgical History:   Procedure Laterality Date   • ETHMOIDECTOMY  2/14/2012    Performed by GÓMEZ VALDERRAMA at SURGERY SAME DAY [facility] ORS   • NASAL POLYPECTOMY  2/14/2012    Performed by GÓMEZ VALDERRAMA at SURGERY SAME DAY [facility]   • NASAL SEPTAL RECONSTRUCTION  2/14/2012    [facility]   • OTHER ORTHOPEDIC SURGERY      ORIF right wrist     No family history on file.  Social History     Socioeconomic History   • Marital status:      Spouse name: Not on file "   • Number of children: Not on file   • Years of education: Not on file   • Highest education level: Not on file   Occupational History   • Not on file   Social Needs   • Financial resource strain: Not on file   • Food insecurity:     Worry: Not on file     Inability: Not on file   • Transportation needs:     Medical: Not on file     Non-medical: Not on file   Tobacco Use   • Smoking status: Never Smoker   • Smokeless tobacco: Never Used   Substance and Sexual Activity   • Alcohol use: No   • Drug use: No   • Sexual activity: Not on file   Lifestyle   • Physical activity:     Days per week: Not on file     Minutes per session: Not on file   • Stress: Not on file   Relationships   • Social connections:     Talks on phone: Not on file     Gets together: Not on file     Attends Yazidism service: Not on file     Active member of club or organization: Not on file     Attends meetings of clubs or organizations: Not on file     Relationship status: Not on file   • Intimate partner violence:     Fear of current or ex partner: Not on file     Emotionally abused: Not on file     Physically abused: Not on file     Forced sexual activity: Not on file   Other Topics Concern   • Not on file   Social History Narrative   • Not on file     Allergies   Allergen Reactions   • Aspirin Anaphylaxis   • Codeine    • Moxifloxacin Hcl In Nacl Rash   • Robitussin Dm [Guiatuss Dm] Anaphylaxis     Outpatient Encounter Medications as of 10/23/2019   Medication Sig Dispense Refill   • trandolapril (MAVIK) 4 MG Tab TK 1 T PO  BID  0   • hydrochlorothiazide (HYDRODIURIL) 12.5 MG tablet Take 12.5 mg by mouth every day.     • albuterol (VENTOLIN OR PROVENTIL) 108 (90 BASE) MCG/ACT AERS Inhale 2 Puffs by mouth every four hours as needed for Shortness of Breath. 1 Inhaler 3   • fluticasone-salmeterol (ADVAIR DISKUS) 100-50 MCG/DOSE AEPB Inhale 1 Puff by mouth every day.     • atorvastatin (LIPITOR) 10 MG TABS Take 20 mg by mouth every evening.     •  "Multiple Vitamins-Minerals (ONE-A-DAY 50 PLUS PO) Take  by mouth every day.     • Cholecalciferol (VITAMIN D3 PO) Take  by mouth every day.     • CALCIUM PO Take  by mouth every day. Pt does not know dose     • [DISCONTINUED] Influenza Vac Tiss-Cult Subunt (FLUCELVAX IM) ADM 0.5ML IM UTD  0   • [DISCONTINUED] atorvastatin (LIPITOR) 10 MG Tab LIPITOR 10 MG TABS     • [DISCONTINUED] amoxicillin (AMOXIL) 500 MG CAPS Take 1 Cap by mouth 2 times a day. 20 Cap 0   • [DISCONTINUED] fluconazole (DIFLUCAN) 150 MG tablet Take 1 Tab by mouth every day. 1 Each 0   • [DISCONTINUED] Trandolapril (MAVIK PO) Take 8 mg by mouth every day.       No facility-administered encounter medications on file as of 10/23/2019.      Review of Systems   Constitutional: Negative for chills, fever and malaise/fatigue.   Eyes: Negative for blurred vision and discharge.   Respiratory: Negative for cough and shortness of breath.    Cardiovascular: Positive for palpitations. Negative for chest pain, leg swelling and PND.   Gastrointestinal: Negative for heartburn and nausea.   Genitourinary: Negative for dysuria and urgency.   Musculoskeletal: Positive for joint pain. Negative for myalgias.   Skin: Negative for itching and rash.   Neurological: Negative for dizziness and headaches.   Endo/Heme/Allergies: Negative for environmental allergies. Does not bruise/bleed easily.   Psychiatric/Behavioral: Negative for depression. The patient is not nervous/anxious.         Objective:   /80 (BP Location: Left arm, Patient Position: Sitting, BP Cuff Size: Adult)   Pulse 92   Ht 1.651 m (5' 5\")   Wt 100.7 kg (222 lb)   SpO2 93%   BMI 36.94 kg/m²     Physical Exam   Constitutional: She is oriented to person, place, and time.   Obese pleasant lady who is a good historian   Neck: No JVD present.   Cardiovascular: Normal rate and regular rhythm. Exam reveals no gallop and no friction rub.   No murmur heard.  Pulmonary/Chest: Effort normal. She has no " wheezes.   Abdominal: Soft. There is no tenderness.   Musculoskeletal: She exhibits no edema.   Neurological: She is alert and oriented to person, place, and time.   Skin: Skin is warm and dry.       Assessment:     1. Hypertension, unspecified type  EKG    EC-ECHOCARDIOGRAM COMPLETE W/O CONT   2. History of sinus tachycardia  Holter Monitor Study    EC-ECHOCARDIOGRAM COMPLETE W/O CONT       Medical Decision Making:  Today's Assessment / Status / Plan:   History of sinus tachycardia not confirmed during the visit today.    We will place a 48-hour Holter monitor  And doing echocardiogram.  Consider overnight pulse oximetry.  Return after the above.  Thank you for this consult

## 2019-10-23 NOTE — LETTER
"     [facility]   [address], [facility] [place] [address]  [place] [address]  Phone: [phone]  Fax: [phone]              Fiona Daniels  1950    Encounter Date: 10/23/2019    Tomás Worthington M.D.          PROGRESS NOTE:  Chief Complaint   Patient presents with   • Tachycardia       Subjective:   Fiona Daniels is a 69 y.o. female who presents today in consultation at the request of Dr. Rodríguez for tendency for rapid heart rate.    This patient is noticed heart rates of 100 at rest or up to 130 with minimal physical activity for about 1 year.  She is minimally bothered by this chief complaint.  She used to walk and hike 2 miles a day but in the last year has not done it due to plantar fasciitis.    She has no history of heart disease.  She is treated for hypertension with low-dose hydrochlorothiazide and ACE inhibitor.    No history of snoring or daytime somnolence.    Recent lab work demonstrates a normal CBC in June 2019 and a TSH more recently which was normal.  She tends to have a borderline high total bilirubin probably due to Guilbert syndrome.    She is recently retired from her job as a nurse and enjoys correction.    Past medical history: Treated hypertension and treated hyperlipidemia.  Most recent LDL cholesterol was 75.  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  Her  has sleep apnea.  No tobacco.  No alcohol abuse.  No illicit drugs.      Office EKG today demonstrates sinus rhythm and a heart rate of 93 and is normal.    Past Medical History:   Diagnosis Date   • Anesthesia     mother  \"dead for a minute\"   • ASTHMA    • Hepatitis A     age 6   • Hypertension      Past Surgical History:   Procedure Laterality Date   • ETHMOIDECTOMY  2/14/2012    Performed [facility]   • NASAL POLYPECTOMY  2/14/2012    [facility]   • NASAL SEPTAL " RECONSTRUCTION  2/14/2012    Performed by GÓMEZ VALDERRAMA at SURGERY SAME DAY [facility] OR[facility]   • OTHER ORTHOPEDIC SURGERY      ORIF right wrist     No family history on file.  Social History     Socioeconomic History   • Marital status:      Spouse name: Not on file   • Number of children: Not on file   • Years of education: Not on file   • Highest education level: Not on file   Occupational History   • Not on file   Social Needs   • Financial resource strain: Not on file   • Food insecurity:     Worry: Not on file     Inability: Not on file   • Transportation needs:     Medical: Not on file     Non-medical: Not on file   Tobacco Use   • Smoking status: Never Smoker   • Smokeless tobacco: Never Used   Substance and Sexual Activity   • Alcohol use: No   • Drug use: No   • Sexual activity: Not on file   Lifestyle   • Physical activity:     Days per week: Not on file     Minutes per session: Not on file   • Stress: Not on file   Relationships   • Social connections:     Talks on phone: Not on file     Gets together: Not on file     Attends [employer] service: Not on file     Active member of club or organization: Not on file     Attends meetings of clubs or organizations: Not on file     Relationship status: Not on file   • Intimate partner violence:     Fear of current or ex partner: Not on file     Emotionally abused: Not on file     Physically abused: Not on file     Forced sexual activity: Not on file   Other Topics Concern   • Not on file   Social History Narrative   • Not on file     Allergies   Allergen Reactions   • Aspirin Anaphylaxis   • Codeine    • Moxifloxacin Hcl In Nacl Rash   • Robitussin Dm [Guiatuss Dm] Anaphylaxis     Outpatient Encounter Medications as of 10/23/2019   Medication Sig Dispense Refill   • trandolapril (MAVIK) 4 MG Tab TK 1 T PO  BID  0   • hydrochlorothiazide (HYDRODIURIL) 12.5 MG tablet Take 12.5 mg by mouth every day.     • albuterol (VENTOLIN OR PROVENTIL) 108 (90 BASE) MCG/ACT  "AERS Inhale 2 Puffs by mouth every four hours as needed for Shortness of Breath. 1 Inhaler 3   • fluticasone-salmeterol (ADVAIR DISKUS) 100-50 MCG/DOSE AEPB Inhale 1 Puff by mouth every day.     • atorvastatin (LIPITOR) 10 MG TABS Take 20 mg by mouth every evening.     • Multiple Vitamins-Minerals (ONE-A-DAY 50 PLUS PO) Take  by mouth every day.     • Cholecalciferol (VITAMIN D3 PO) Take  by mouth every day.     • CALCIUM PO Take  by mouth every day. Pt does not know dose     • [DISCONTINUED] Influenza Vac Tiss-Cult Subunt (FLUCELVAX IM) ADM 0.5ML IM UTD  0   • [DISCONTINUED] atorvastatin (LIPITOR) 10 MG Tab LIPITOR 10 MG TABS     • [DISCONTINUED] amoxicillin (AMOXIL) 500 MG CAPS Take 1 Cap by mouth 2 times a day. 20 Cap 0   • [DISCONTINUED] fluconazole (DIFLUCAN) 150 MG tablet Take 1 Tab by mouth every day. 1 Each 0   • [DISCONTINUED] Trandolapril (MAVIK PO) Take 8 mg by mouth every day.       No facility-administered encounter medications on file as of 10/23/2019.      Review of Systems   Constitutional: Negative for chills, fever and malaise/fatigue.   Eyes: Negative for blurred vision and discharge.   Respiratory: Negative for cough and shortness of breath.    Cardiovascular: Positive for palpitations. Negative for chest pain, leg swelling and PND.   Gastrointestinal: Negative for heartburn and nausea.   Genitourinary: Negative for dysuria and urgency.   Musculoskeletal: Positive for joint pain. Negative for myalgias.   Skin: Negative for itching and rash.   Neurological: Negative for dizziness and headaches.   Endo/Heme/Allergies: Negative for environmental allergies. Does not bruise/bleed easily.   Psychiatric/Behavioral: Negative for depression. The patient is not nervous/anxious.         Objective:   /80 (BP Location: Left arm, Patient Position: Sitting, BP Cuff Size: Adult)   Pulse 92   Ht 1.651 m (5' 5\")   Wt 100.7 kg (222 lb)   SpO2 93%   BMI 36.94 kg/m²      Physical Exam   Constitutional: She " is oriented to person, place, and time.   Obese pleasant lady who is a good historian   Neck: No JVD present.   Cardiovascular: Normal rate and regular rhythm. Exam reveals no gallop and no friction rub.   No murmur heard.  Pulmonary/Chest: Effort normal. She has no wheezes.   Abdominal: Soft. There is no tenderness.   Musculoskeletal: She exhibits no edema.   Neurological: She is alert and oriented to person, place, and time.   Skin: Skin is warm and dry.       Assessment:     1. Hypertension, unspecified type  EKG    EC-ECHOCARDIOGRAM COMPLETE W/O CONT   2. History of sinus tachycardia  Holter Monitor Study    EC-ECHOCARDIOGRAM COMPLETE W/O CONT       Medical Decision Making:  Today's Assessment / Status / Plan:   History of sinus tachycardia not confirmed during the visit today.    We will place a 48-hour Holter monitor  And doing echocardiogram.  Consider overnight pulse oximetry.  Return after the above.  Thank you for this consult      Nba Rodríguez D.O.  [address] [place] [address]  VIA Facsimile: [phone]

## 2019-10-29 LAB — EKG IMPRESSION: NORMAL

## 2019-10-31 ENCOUNTER — APPOINTMENT (OUTPATIENT)
Dept: CARDIOLOGY | Facility: MEDICAL CENTER | Age: 69
End: 2019-10-31
Attending: INTERNAL MEDICINE
Payer: MEDICARE

## 2019-11-01 ENCOUNTER — HOSPITAL ENCOUNTER (OUTPATIENT)
Dept: CARDIOLOGY | Facility: MEDICAL CENTER | Age: 69
End: 2019-11-01
Attending: INTERNAL MEDICINE
Payer: MEDICARE

## 2019-11-01 DIAGNOSIS — I10 HYPERTENSION, UNSPECIFIED TYPE: ICD-10-CM

## 2019-11-01 DIAGNOSIS — Z86.79 HISTORY OF SINUS TACHYCARDIA: ICD-10-CM

## 2019-11-01 LAB
LV EJECT FRACT  99904: 75
LV EJECT FRACT MOD 2C 99903: 71
LV EJECT FRACT MOD 4C 99902: 74
LV EJECT FRACT MOD BP 99901: 74.28

## 2019-11-01 PROCEDURE — 93306 TTE W/DOPPLER COMPLETE: CPT | Mod: 26 | Performed by: INTERNAL MEDICINE

## 2019-11-01 PROCEDURE — 93306 TTE W/DOPPLER COMPLETE: CPT

## 2019-11-25 ENCOUNTER — OFFICE VISIT (OUTPATIENT)
Dept: CARDIOLOGY | Facility: MEDICAL CENTER | Age: 69
End: 2019-11-25
Payer: MEDICARE

## 2019-11-25 VITALS
BODY MASS INDEX: 37.02 KG/M2 | WEIGHT: 222.2 LBS | HEART RATE: 92 BPM | DIASTOLIC BLOOD PRESSURE: 80 MMHG | OXYGEN SATURATION: 93 % | HEIGHT: 65 IN | SYSTOLIC BLOOD PRESSURE: 118 MMHG

## 2019-11-25 DIAGNOSIS — E78.2 MIXED HYPERLIPIDEMIA: ICD-10-CM

## 2019-11-25 DIAGNOSIS — I10 ESSENTIAL HYPERTENSION: ICD-10-CM

## 2019-11-25 DIAGNOSIS — Z86.79 HISTORY OF SINUS TACHYCARDIA: ICD-10-CM

## 2019-11-25 DIAGNOSIS — I47.29 NONSUSTAINED VENTRICULAR TACHYCARDIA (HCC): ICD-10-CM

## 2019-11-25 PROCEDURE — 99214 OFFICE O/P EST MOD 30 MIN: CPT | Performed by: NURSE PRACTITIONER

## 2019-11-25 RX ORDER — METOPROLOL SUCCINATE 25 MG/1
25 TABLET, EXTENDED RELEASE ORAL DAILY
Qty: 30 TAB | Refills: 11 | Status: SHIPPED | OUTPATIENT
Start: 2019-11-25 | End: 2020-07-14 | Stop reason: SDUPTHER

## 2019-11-25 ASSESSMENT — ENCOUNTER SYMPTOMS
CLAUDICATION: 0
SHORTNESS OF BREATH: 0
ORTHOPNEA: 0
PALPITATIONS: 0
WEAKNESS: 0
DIZZINESS: 0
WEIGHT LOSS: 0
PND: 0

## 2019-11-25 NOTE — PROGRESS NOTES
"Chief Complaint   Patient presents with   • Hypertension       Subjective:   Fiona Daniels is a very pleasant 69 y.o. female who presents today for follow-up regarding complaints of rapid heart rate.    Fiona was seen for initial consultation by Dr. Tomás Worthington on 10/23/2019.  Patient noticed heart rates of 100 at rest and up to 130 with minimal physical activity that has been ongoing for approximately 1 year. EKG was completed and showed NSR, rate 93. A 48 hour Holter Monitor and Echocardiogram was ordered.     Past medical history significant for hypertension.      Today patient states that she is feeling generally very well. She is not significantly symptomatic with her elevated heart rate, just feels that it is fast at times.     She tells me that she does have significant family history of CAD on her paternal side. Her father suffered cardiac arrest due to MI at the age of 60 and her aunt had an MI in her 60's as well.      Past Medical History:   Diagnosis Date   • Anesthesia     mother  \"dead for a minute\"   • ASTHMA    • Hepatitis A     age 6   • Hypertension      Past Surgical History:   Procedure Laterality Date   • ETHMOIDECTOMY  2/14/2012    Performed by GÓMEZ VALDERRAMA at SURGERY SAME DAY [facility]S   • NASAL POLYPECTOMY  2/14/2012    Performed by GÓMEZ VALDERRAMA at SURGERY SAME DAY [facility]   • NASAL SEPTAL RECONSTRUCTION  2/14/2012    [facility]   • OTHER ORTHOPEDIC SURGERY      ORIF right wrist     History reviewed. No pertinent family history.  Social History     Socioeconomic History   • Marital status:      Spouse name: Not on file   • Number of children: Not on file   • Years of education: Not on file   • Highest education level: Not on file   Occupational History   • Not on file   Social Needs   • Financial resource strain: Not on file   • Food insecurity:     Worry: Not on file     Inability: Not on file   • Transportation " needs:     Medical: Not on file     Non-medical: Not on file   Tobacco Use   • Smoking status: Never Smoker   • Smokeless tobacco: Never Used   Substance and Sexual Activity   • Alcohol use: No   • Drug use: No   • Sexual activity: Not on file   Lifestyle   • Physical activity:     Days per week: Not on file     Minutes per session: Not on file   • Stress: Not on file   Relationships   • Social connections:     Talks on phone: Not on file     Gets together: Not on file     Attends Anabaptism service: Not on file     Active member of club or organization: Not on file     Attends meetings of clubs or organizations: Not on file     Relationship status: Not on file   • Intimate partner violence:     Fear of current or ex partner: Not on file     Emotionally abused: Not on file     Physically abused: Not on file     Forced sexual activity: Not on file   Other Topics Concern   • Not on file   Social History Narrative   • Not on file     Allergies   Allergen Reactions   • Aspirin Anaphylaxis   • Codeine    • Moxifloxacin Hcl In Nacl Rash   • Robitussin Dm [Guiatuss Dm] Anaphylaxis     Outpatient Encounter Medications as of 11/25/2019   Medication Sig Dispense Refill   • metoprolol SR (TOPROL XL) 25 MG TABLET SR 24 HR Take 1 Tab by mouth every day. 30 Tab 11   • trandolapril (MAVIK) 4 MG Tab TK 1 T PO  BID  0   • hydrochlorothiazide (HYDRODIURIL) 12.5 MG tablet Take 12.5 mg by mouth every day.     • albuterol (VENTOLIN OR PROVENTIL) 108 (90 BASE) MCG/ACT AERS Inhale 2 Puffs by mouth every four hours as needed for Shortness of Breath. 1 Inhaler 3   • fluticasone-salmeterol (ADVAIR DISKUS) 100-50 MCG/DOSE AEPB Inhale 1 Puff by mouth every day.     • atorvastatin (LIPITOR) 10 MG TABS Take 20 mg by mouth every evening.     • Multiple Vitamins-Minerals (ONE-A-DAY 50 PLUS PO) Take  by mouth every day.     • Cholecalciferol (VITAMIN D3 PO) Take  by mouth every day.     • CALCIUM PO Take  by mouth every day. Pt does not know dose    "    No facility-administered encounter medications on file as of 11/25/2019.      Review of Systems   Constitutional: Negative for malaise/fatigue and weight loss.   Respiratory: Negative for shortness of breath.    Cardiovascular: Negative for chest pain, palpitations, orthopnea, claudication, leg swelling and PND.   Neurological: Negative for dizziness and weakness.   All other systems reviewed and are negative.       Objective:   /80 (BP Location: Left arm, Patient Position: Sitting, BP Cuff Size: Adult)   Pulse 92   Ht 1.651 m (5' 5\")   Wt 100.8 kg (222 lb 3.2 oz)   SpO2 93%   BMI 36.98 kg/m²     Physical Exam   Constitutional: She is oriented to person, place, and time. She appears well-developed and well-nourished. No distress.   HENT:   Head: Normocephalic.   Eyes: EOM are normal.   Neck: No JVD present.   Cardiovascular: Normal rate, regular rhythm and normal heart sounds.   Pulmonary/Chest: Breath sounds normal. No respiratory distress.   Abdominal: Soft. There is no tenderness.   Musculoskeletal:         General: No edema.   Neurological: She is alert and oriented to person, place, and time.   Skin: Skin is warm and dry.   Psychiatric: She has a normal mood and affect. Her behavior is normal.        Echocardiogram 11/1/19:  No prior study is available for comparison.   Hyperdynamic left ventricular systolic function.  Left ventricular ejection fraction is visually estimated to be greater than 75%.  No significant valve abnormality.  Unable to estimate RVSP, normal RAP.    Holter Monitor 10/29/19:  Interpretive Statements     Monitoring started at 10:11 AM and continued for 48 hours. Overall rhythm was Sinus. The average heart rate was 95 BPM. The minimum heart rate was 64 BPM, occurring at 6:54:49 AM D2. The maximum heart rate was 140 BPM, occurring at 4:53:47 PM D2. The longest R-R interval was 1.2 seconds occurring at 6:44:10 AM D2.     Ventricular ectopic activity consisted of one run, one " couplet, 62 single multifocal PVCs, and 7 single endiastolic beats. The longest and fastest ventricular run occurred at 2:25:01 AM D1, consisting of   14 beats, with maximum heart rate of 129 BPM.     The patient's rhythm included 7 hours 14 minutes 52 seconds of bradycardia. The slowest single episode of bradycardia occurred at 6:22:28 AM D2, lasting 32 minutes 32 seconds, with minimum heart   rate of 64 BPM.     Supraventricular ectopic activity consisted of one late beat and 6 single PACs.     Diary Entries- No symptoms listed in diary.     Assessment:     1. Nonsustained ventricular tachycardia (HCC)  NM-CARDIAC PET    OVERNIGHT PULSE OXIMETRY   2. History of sinus tachycardia     3. Essential hypertension     4. Mixed hyperlipidemia         Medical Decision Making:  Today's Assessment / Status / Plan:     Tachycardia:  -48 hour Holter showed overall NSR, average rate of 95. 14 beat ventricular run with multiple non-sustained episodes on HR in the low 100's-120's (highest was 140 bpm). No significant bradycardia observed.   -TTE showed hyperdynamic LV systolic function (70%) with no significant valvular abnormality.  -OPO as recommended by Dr. Worthington and Cardiac PET scan ordered (unable to complete TM).   -Start Toprol 25 mg daily.    HTN:  -Stable.  -Continue trandolapril 4 mg daily and HCTZ 12.5 mg daily.     HLD:  -Stable.  -LDL on 10/5/19 was 100.  -Continue Atorvastatin 10 mg daily for now.     Patient will establish care with Dr. Lindsay Laureano as scheduled below.  Encourage patient to contact office should any questions or concerns arise meantime.  Patient understands and agrees plan of care.    Future Appointments   Date Time Provider Department Center   12/4/2019  8:00 AM [facility] NM CARDIAC PET [facility]   3/4/2020  9:15 AM Lindsay Laureano M.D. [facility] None     Collaborating Provider: Dr. Lindsay Laureano        Please note that this dictation was created using voice recognition software. I have made every  reasonable attempt to correct obvious errors, but I expect that there are errors of grammar and possibly content I did not discover before finalizing the note.

## 2019-12-04 ENCOUNTER — HOSPITAL ENCOUNTER (OUTPATIENT)
Dept: RADIOLOGY | Facility: MEDICAL CENTER | Age: 69
End: 2019-12-04
Attending: NURSE PRACTITIONER
Payer: MEDICARE

## 2019-12-04 DIAGNOSIS — I47.29 NONSUSTAINED VENTRICULAR TACHYCARDIA (HCC): ICD-10-CM

## 2019-12-04 PROCEDURE — 78492 MYOCRD IMG PET MLT RST&STRS: CPT | Mod: 26 | Performed by: INTERNAL MEDICINE

## 2019-12-04 PROCEDURE — 93018 CV STRESS TEST I&R ONLY: CPT | Performed by: INTERNAL MEDICINE

## 2019-12-09 ENCOUNTER — TELEPHONE (OUTPATIENT)
Dept: CARDIOLOGY | Facility: MEDICAL CENTER | Age: 69
End: 2019-12-09

## 2019-12-09 DIAGNOSIS — Z86.79 HISTORY OF SINUS TACHYCARDIA: ICD-10-CM

## 2019-12-09 DIAGNOSIS — I47.29 NONSUSTAINED VENTRICULAR TACHYCARDIA (HCC): ICD-10-CM

## 2019-12-09 DIAGNOSIS — Z91.89 OTHER SPECIFIED PERSONAL RISK FACTORS, NOT ELSEWHERE CLASSIFIED: ICD-10-CM

## 2019-12-09 DIAGNOSIS — R00.1 BRADYCARDIA: ICD-10-CM

## 2019-12-09 DIAGNOSIS — I10 ESSENTIAL HYPERTENSION: ICD-10-CM

## 2019-12-09 DIAGNOSIS — E78.2 MIXED HYPERLIPIDEMIA: ICD-10-CM

## 2019-12-09 NOTE — TELEPHONE ENCOUNTER
Result Notes for NM-CARDIAC PET     Notes recorded by ERICKA Delgado on 12/9/2019 at 7:42 AM PST  Please let Fiona know that her PET did not show any obvious evidence of lack of blood flow but that there was some transient dilation that could indicated balanced three vessel disease. I would like for her to obtain a coronary calcium CT scan so we can assess for the presence of calcified plaque in her coronary arteries.         Called pt to discuss above. Advised CT-cardiac score per JS recommendations, education provided. Phone number to schedulers given and advised to give them a call to schedule if she has not heard from them within a few days. Pt verbalized understanding and appreciative of call.    CT-cardiac scoring ordered.

## 2020-02-14 ENCOUNTER — APPOINTMENT (OUTPATIENT)
Dept: RADIOLOGY | Facility: IMAGING CENTER | Age: 70
End: 2020-02-14
Attending: PHYSICIAN ASSISTANT
Payer: MEDICARE

## 2020-02-14 ENCOUNTER — OFFICE VISIT (OUTPATIENT)
Dept: URGENT CARE | Facility: PHYSICIAN GROUP | Age: 70
End: 2020-02-14
Payer: MEDICARE

## 2020-02-14 VITALS
WEIGHT: 221 LBS | TEMPERATURE: 99 F | RESPIRATION RATE: 18 BRPM | SYSTOLIC BLOOD PRESSURE: 120 MMHG | HEIGHT: 65 IN | DIASTOLIC BLOOD PRESSURE: 76 MMHG | HEART RATE: 83 BPM | BODY MASS INDEX: 36.82 KG/M2 | OXYGEN SATURATION: 92 %

## 2020-02-14 DIAGNOSIS — R07.81 RIB PAIN ON RIGHT SIDE: ICD-10-CM

## 2020-02-14 DIAGNOSIS — S20.211A CONTUSION OF RIB ON RIGHT SIDE, INITIAL ENCOUNTER: Primary | ICD-10-CM

## 2020-02-14 PROCEDURE — 99203 OFFICE O/P NEW LOW 30 MIN: CPT | Performed by: PHYSICIAN ASSISTANT

## 2020-02-14 PROCEDURE — 71101 X-RAY EXAM UNILAT RIBS/CHEST: CPT | Mod: TC,RT | Performed by: PHYSICIAN ASSISTANT

## 2020-02-14 ASSESSMENT — ENCOUNTER SYMPTOMS
NAUSEA: 0
CHILLS: 0
FEVER: 0
WEAKNESS: 0
CONSTIPATION: 0
DIARRHEA: 0
SHORTNESS OF BREATH: 0
JOINT SWELLING: 0
COUGH: 0
NUMBNESS: 0
ABDOMINAL PAIN: 0
VOMITING: 0

## 2020-02-14 NOTE — PATIENT INSTRUCTIONS
Rib Contusion  Introduction  A rib contusion is a deep bruise on your rib area. Contusions are the result of a blunt trauma that causes bleeding and injury to the tissues under the skin. A rib contusion may involve bruising of the ribs and of the skin and muscles in the area. The skin overlying the contusion may turn blue, purple, or yellow. Minor injuries will give you a painless contusion, but more severe contusions may stay painful and swollen for a few weeks.  What are the causes?  A contusion is usually caused by a blow, trauma, or direct force to an area of the body. This often occurs while playing contact sports.  What are the signs or symptoms?  · Swelling and redness of the injured area.  · Discoloration of the injured area.  · Tenderness and soreness of the injured area.  · Pain with or without movement.  How is this diagnosed?  The diagnosis can be made by taking a medical history and performing a physical exam. An X-ray, CT scan, or MRI may be needed to determine if there were any associated injuries, such as broken bones (fractures) or internal injuries.  How is this treated?  Often, the best treatment for a rib contusion is rest. Icing or applying cold compresses to the injured area may help reduce swelling and inflammation. Deep breathing exercises may be recommended to reduce the risk of partial lung collapse and pneumonia. Over-the-counter or prescription medicines may also be recommended for pain control.  Follow these instructions at home:  · Apply ice to the injured area:  ¨ Put ice in a plastic bag.  ¨ Place a towel between your skin and the bag.  ¨ Leave the ice on for 20 minutes, 2-3 times per day.  · Take medicines only as directed by your health care provider.  · Rest the injured area. Avoid strenuous activity and any activities or movements that cause pain. Be careful during activities and avoid bumping the injured area.  · Perform deep-breathing exercises as directed by your health care  provider.  · Do not lift anything that is heavier than 5 lb (2.3 kg) until your health care provider approves.  · Do not use any tobacco products, including cigarettes, chewing tobacco, or electronic cigarettes. If you need help quitting, ask your health care provider.  Contact a health care provider if:  · You have increased bruising or swelling.  · You have pain that is not controlled with treatment.  · You have a fever.  Get help right away if:  · You have difficulty breathing or shortness of breath.  · You develop a continual cough, or you cough up thick or bloody sputum.  · You feel sick to your stomach (nauseous), you throw up (vomit), or you have abdominal pain.  This information is not intended to replace advice given to you by your health care provider. Make sure you discuss any questions you have with your health care provider.  Document Released: 09/12/2002 Document Revised: 05/25/2017 Document Reviewed: 09/29/2015  © 2017 [employer]

## 2020-02-14 NOTE — PROGRESS NOTES
Subjective:   Fiona Daniels is a 69 y.o. female who presents for Hip Pain (R side hip, pt hurt it, felt popping when she hurt it, painful, constantly hurting, swelling, x3 days )        Rib Injury   This is a new problem. Episode onset: 3 days. The problem occurs constantly. The problem has been gradually worsening. Pertinent negatives include no abdominal pain, chills, coughing, fever, joint swelling, nausea, numbness, vomiting or weakness. She has tried acetaminophen for the symptoms. The treatment provided mild relief.     Pt was bending over the side of an arm to  a roll of paper towels when she felt a sudden pop in the R lateral ribs. She has had constant pain in area since the incident. Pain is worse with movements and palpations. No previous injury or surgeries.     Review of Systems   Constitutional: Negative for chills, fever and malaise/fatigue.   Respiratory: Negative for cough and shortness of breath.    Gastrointestinal: Negative for abdominal pain, constipation, diarrhea, nausea and vomiting.   Musculoskeletal: Negative for joint swelling.        Pos rib pain   Neurological: Negative for weakness and numbness.   All other systems reviewed and are negative.      PMH:  has a past medical history of Anesthesia, ASTHMA, Hepatitis A, and Hypertension. She also has no past medical history of Breast cancer (HCC).  MEDS:   Current Outpatient Medications:   •  metoprolol SR (TOPROL XL) 25 MG TABLET SR 24 HR, Take 1 Tab by mouth every day., Disp: 30 Tab, Rfl: 11  •  trandolapril (MAVIK) 4 MG Tab, TK 1 T PO  BID, Disp: , Rfl: 0  •  hydrochlorothiazide (HYDRODIURIL) 12.5 MG tablet, Take 12.5 mg by mouth every day., Disp: , Rfl:   •  albuterol (VENTOLIN OR PROVENTIL) 108 (90 BASE) MCG/ACT AERS, Inhale 2 Puffs by mouth every four hours as needed for Shortness of Breath., Disp: 1 Inhaler, Rfl: 3  •  fluticasone-salmeterol (ADVAIR DISKUS) 100-50 MCG/DOSE AEPB, Inhale 1 Puff by mouth every day., Disp: , Rfl:  "  •  atorvastatin (LIPITOR) 10 MG TABS, Take 20 mg by mouth every evening., Disp: , Rfl:   •  Multiple Vitamins-Minerals (ONE-A-DAY 50 PLUS PO), Take  by mouth every day., Disp: , Rfl:   •  Cholecalciferol (VITAMIN D3 PO), Take  by mouth every day., Disp: , Rfl:   •  CALCIUM PO, Take  by mouth every day. Pt does not know dose, Disp: , Rfl:   ALLERGIES:   Allergies   Allergen Reactions   • Aspirin Anaphylaxis   • Codeine    • Moxifloxacin Hcl In Nacl Rash   • Robitussin Dm [Guiatuss Dm] Anaphylaxis     SURGHX:   Past Surgical History:   Procedure Laterality Date   • ETHMOIDECTOMY  2/14/2012    Performed by GÓMEZ VALDERRAMA at [facility]ERY [facility]   • NASAL POLYPECTOMY  2/14/2012    Performed by GÓMEZ VALDERRAMA at [facility]   • NASAL SEPTAL RECONSTRUCTION  2/14/2012    Performed by GÓMEZ VALDERRAMA at [facility]   • OTHER ORTHOPEDIC SURGERY      ORIF right wrist     SOCHX:  reports that she has never smoked. She has never used smokeless tobacco. She reports that she does not drink alcohol or use drugs.  History reviewed. No pertinent family history.     Objective:   /76 (BP Location: Left arm, Patient Position: Sitting, BP Cuff Size: Large adult)   Pulse 83   Temp 37.2 °C (99 °F) (Temporal)   Resp 18   Ht 1.651 m (5' 5\")   Wt 100.2 kg (221 lb)   SpO2 92%   BMI 36.78 kg/m²     Physical Exam  Vitals signs reviewed.   Constitutional:       General: She is not in acute distress.     Appearance: She is well-developed.   HENT:      Head: Normocephalic and atraumatic.      Right Ear: External ear normal.      Left Ear: External ear normal.      Nose: Nose normal.      Mouth/Throat:      Mouth: Mucous membranes are moist.   Eyes:      Conjunctiva/sclera: Conjunctivae normal.      Pupils: Pupils are equal, round, and reactive to light.   Neck:      Musculoskeletal: Normal range of motion and neck supple.      Trachea: No tracheal deviation.   Cardiovascular:      Rate " and Rhythm: Normal rate and regular rhythm.   Pulmonary:      Effort: Pulmonary effort is normal. No respiratory distress.      Breath sounds: Normal breath sounds. No wheezing, rhonchi or rales.   Chest:       Skin:     General: Skin is warm and dry.      Capillary Refill: Capillary refill takes less than 2 seconds.   Neurological:      General: No focal deficit present.      Mental Status: She is alert and oriented to person, place, and time.   Psychiatric:         Mood and Affect: Mood normal.         Behavior: Behavior normal.           Assessment/Plan:     1. Contusion of rib on right side, initial encounter     2. Rib pain on right side  [id number] (WITH 1-VIEW CXR) RIGHT     Supportive care reviewed.  Continue Tylenol, rest, ice.  Avoid heavy lifting.  Rib contusion handout provided.    Follow-up with primary care provider within 7-10 days.  If symptoms worsen or persist patient can return to clinic for reevaluation. Patient confirmed understanding of information.    Please note that this dictation was created using voice recognition software. I have made every reasonable attempt to correct obvious errors, but I expect that there are errors of grammar and possibly content that I did not discover before finalizing the note.

## 2020-04-29 ENCOUNTER — APPOINTMENT (RX ONLY)
Dept: URBAN - METROPOLITAN AREA CLINIC 4 | Facility: CLINIC | Age: 70
Setting detail: DERMATOLOGY
End: 2020-04-29

## 2020-04-29 DIAGNOSIS — L84 CORNS AND CALLOSITIES: ICD-10-CM

## 2020-04-29 DIAGNOSIS — L57.0 ACTINIC KERATOSIS: ICD-10-CM

## 2020-04-29 DIAGNOSIS — L82.1 OTHER SEBORRHEIC KERATOSIS: ICD-10-CM

## 2020-04-29 PROCEDURE — 17000 DESTRUCT PREMALG LESION: CPT

## 2020-04-29 PROCEDURE — 17003 DESTRUCT PREMALG LES 2-14: CPT

## 2020-04-29 PROCEDURE — ? LIQUID NITROGEN

## 2020-04-29 PROCEDURE — ? COUNSELING

## 2020-04-29 PROCEDURE — 99202 OFFICE O/P NEW SF 15 MIN: CPT | Mod: 25

## 2020-04-29 ASSESSMENT — LOCATION SIMPLE DESCRIPTION DERM
LOCATION SIMPLE: RIGHT FOOT
LOCATION SIMPLE: RIGHT THIGH
LOCATION SIMPLE: RIGHT CLAVICULAR SKIN
LOCATION SIMPLE: LEFT FOOT
LOCATION SIMPLE: LEFT CHEEK

## 2020-04-29 ASSESSMENT — LOCATION DETAILED DESCRIPTION DERM
LOCATION DETAILED: RIGHT ANTERIOR LATERAL PROXIMAL THIGH
LOCATION DETAILED: RIGHT MEDIAL DORSAL FOOT
LOCATION DETAILED: RIGHT CLAVICULAR SKIN
LOCATION DETAILED: LEFT INFERIOR MEDIAL MALAR CHEEK
LOCATION DETAILED: LEFT INFERIOR LATERAL MALAR CHEEK
LOCATION DETAILED: LEFT MEDIAL DORSAL FOOT

## 2020-04-29 ASSESSMENT — LOCATION ZONE DERM
LOCATION ZONE: LEG
LOCATION ZONE: FACE
LOCATION ZONE: TRUNK
LOCATION ZONE: FEET

## 2020-05-11 ENCOUNTER — HOSPITAL ENCOUNTER (OUTPATIENT)
Dept: LAB | Facility: MEDICAL CENTER | Age: 70
End: 2020-05-11
Attending: FAMILY MEDICINE
Payer: MEDICARE

## 2020-05-11 LAB
ALBUMIN SERPL BCP-MCNC: 4.3 G/DL (ref 3.2–4.9)
ALBUMIN/GLOB SERPL: 1.6 G/DL
ALP SERPL-CCNC: 71 U/L (ref 30–99)
ALT SERPL-CCNC: 26 U/L (ref 2–50)
ANION GAP SERPL CALC-SCNC: 13 MMOL/L (ref 7–16)
AST SERPL-CCNC: 29 U/L (ref 12–45)
BASOPHILS # BLD AUTO: 0.6 % (ref 0–1.8)
BASOPHILS # BLD: 0.04 K/UL (ref 0–0.12)
BILIRUB SERPL-MCNC: 1.2 MG/DL (ref 0.1–1.5)
BUN SERPL-MCNC: 17 MG/DL (ref 8–22)
CALCIUM SERPL-MCNC: 9.6 MG/DL (ref 8.5–10.5)
CHLORIDE SERPL-SCNC: 102 MMOL/L (ref 96–112)
CHOLEST SERPL-MCNC: 155 MG/DL (ref 100–199)
CO2 SERPL-SCNC: 27 MMOL/L (ref 20–33)
CREAT SERPL-MCNC: 0.69 MG/DL (ref 0.5–1.4)
EOSINOPHIL # BLD AUTO: 0.19 K/UL (ref 0–0.51)
EOSINOPHIL NFR BLD: 2.8 % (ref 0–6.9)
ERYTHROCYTE [DISTWIDTH] IN BLOOD BY AUTOMATED COUNT: 49.2 FL (ref 35.9–50)
FASTING STATUS PATIENT QL REPORTED: NORMAL
GLOBULIN SER CALC-MCNC: 2.7 G/DL (ref 1.9–3.5)
GLUCOSE SERPL-MCNC: 85 MG/DL (ref 65–99)
HCT VFR BLD AUTO: 47.6 % (ref 37–47)
HDLC SERPL-MCNC: 46 MG/DL
HGB BLD-MCNC: 15.6 G/DL (ref 12–16)
IMM GRANULOCYTES # BLD AUTO: 0.01 K/UL (ref 0–0.11)
IMM GRANULOCYTES NFR BLD AUTO: 0.1 % (ref 0–0.9)
LDLC SERPL CALC-MCNC: 79 MG/DL
LYMPHOCYTES # BLD AUTO: 2.08 K/UL (ref 1–4.8)
LYMPHOCYTES NFR BLD: 30.4 % (ref 22–41)
MCH RBC QN AUTO: 32.8 PG (ref 27–33)
MCHC RBC AUTO-ENTMCNC: 32.8 G/DL (ref 33.6–35)
MCV RBC AUTO: 100 FL (ref 81.4–97.8)
MONOCYTES # BLD AUTO: 0.49 K/UL (ref 0–0.85)
MONOCYTES NFR BLD AUTO: 7.2 % (ref 0–13.4)
NEUTROPHILS # BLD AUTO: 4.04 K/UL (ref 2–7.15)
NEUTROPHILS NFR BLD: 58.9 % (ref 44–72)
NRBC # BLD AUTO: 0 K/UL
NRBC BLD-RTO: 0 /100 WBC
PLATELET # BLD AUTO: 203 K/UL (ref 164–446)
PMV BLD AUTO: 11.2 FL (ref 9–12.9)
POTASSIUM SERPL-SCNC: 3.9 MMOL/L (ref 3.6–5.5)
PROT SERPL-MCNC: 7 G/DL (ref 6–8.2)
RBC # BLD AUTO: 4.76 M/UL (ref 4.2–5.4)
SODIUM SERPL-SCNC: 142 MMOL/L (ref 135–145)
TRIGL SERPL-MCNC: 151 MG/DL (ref 0–149)
WBC # BLD AUTO: 6.9 K/UL (ref 4.8–10.8)

## 2020-05-11 PROCEDURE — 85025 COMPLETE CBC W/AUTO DIFF WBC: CPT

## 2020-05-11 PROCEDURE — 80061 LIPID PANEL: CPT

## 2020-05-11 PROCEDURE — 80053 COMPREHEN METABOLIC PANEL: CPT

## 2020-05-11 PROCEDURE — 36415 COLL VENOUS BLD VENIPUNCTURE: CPT

## 2020-05-15 ENCOUNTER — HOSPITAL ENCOUNTER (OUTPATIENT)
Dept: RADIOLOGY | Facility: MEDICAL CENTER | Age: 70
End: 2020-05-15
Attending: FAMILY MEDICINE
Payer: MEDICARE

## 2020-05-15 ENCOUNTER — HOSPITAL ENCOUNTER (OUTPATIENT)
Dept: LAB | Facility: MEDICAL CENTER | Age: 70
End: 2020-05-15
Attending: FAMILY MEDICINE
Payer: MEDICARE

## 2020-05-15 DIAGNOSIS — R22.42 KNEE MASS, LEFT: ICD-10-CM

## 2020-05-15 DIAGNOSIS — R22.41 FOOT MASS, RIGHT: ICD-10-CM

## 2020-05-15 DIAGNOSIS — R22.41 KNEE MASS, RIGHT: ICD-10-CM

## 2020-05-15 LAB
ERYTHROCYTE [SEDIMENTATION RATE] IN BLOOD BY WESTERGREN METHOD: 5 MM/HOUR (ref 0–30)
URATE SERPL-MCNC: 6.8 MG/DL (ref 1.9–8.2)

## 2020-05-15 PROCEDURE — 84550 ASSAY OF BLOOD/URIC ACID: CPT

## 2020-05-15 PROCEDURE — 85652 RBC SED RATE AUTOMATED: CPT

## 2020-05-15 PROCEDURE — 73630 X-RAY EXAM OF FOOT: CPT | Mod: LT

## 2020-05-15 PROCEDURE — 73562 X-RAY EXAM OF KNEE 3: CPT | Mod: LT

## 2020-05-15 PROCEDURE — 73630 X-RAY EXAM OF FOOT: CPT | Mod: RT

## 2020-05-15 PROCEDURE — 36415 COLL VENOUS BLD VENIPUNCTURE: CPT

## 2020-05-15 PROCEDURE — 73562 X-RAY EXAM OF KNEE 3: CPT | Mod: RT

## 2020-06-19 ENCOUNTER — HOSPITAL ENCOUNTER (OUTPATIENT)
Dept: RADIOLOGY | Facility: MEDICAL CENTER | Age: 70
End: 2020-06-19
Attending: FAMILY MEDICINE
Payer: MEDICARE

## 2020-06-19 DIAGNOSIS — Z12.31 VISIT FOR SCREENING MAMMOGRAM: ICD-10-CM

## 2020-06-19 PROCEDURE — 77067 SCR MAMMO BI INCL CAD: CPT

## 2020-06-25 ENCOUNTER — HOSPITAL ENCOUNTER (OUTPATIENT)
Dept: RADIOLOGY | Facility: MEDICAL CENTER | Age: 70
End: 2020-06-25
Attending: FAMILY MEDICINE
Payer: MEDICARE

## 2020-06-25 DIAGNOSIS — R92.8 ABNORMAL MAMMOGRAM: ICD-10-CM

## 2020-06-25 DIAGNOSIS — R92.8 ABNORMAL FINDINGS ON DIAGNOSTIC IMAGING OF BREAST: ICD-10-CM

## 2020-06-25 LAB — PATHOLOGY CONSULT NOTE: NORMAL

## 2020-06-25 PROCEDURE — 88360 TUMOR IMMUNOHISTOCHEM/MANUAL: CPT | Mod: 91

## 2020-06-25 PROCEDURE — 76642 ULTRASOUND BREAST LIMITED: CPT | Mod: LT

## 2020-06-25 PROCEDURE — G0279 TOMOSYNTHESIS, MAMMO: HCPCS | Mod: LT

## 2020-06-25 PROCEDURE — 19083 BX BREAST 1ST LESION US IMAG: CPT

## 2020-06-25 PROCEDURE — 19083 BX BREAST 1ST LESION US IMAG: CPT | Mod: LT

## 2020-06-25 PROCEDURE — 88305 TISSUE EXAM BY PATHOLOGIST: CPT

## 2020-06-26 ENCOUNTER — TELEPHONE (OUTPATIENT)
Dept: RADIOLOGY | Facility: MEDICAL CENTER | Age: 70
End: 2020-06-26

## 2020-06-29 ENCOUNTER — TELEPHONE (OUTPATIENT)
Dept: RADIOLOGY | Facility: MEDICAL CENTER | Age: 70
End: 2020-06-29

## 2020-06-29 NOTE — TELEPHONE ENCOUNTER
HONGT MSG WITH DR CARNES'S OFFICE TO START REFERRAL FOR BREAST SURGEON. OFFICE CONFIRMED THEY DID RECEIVED BREAST BX RESULTS.

## 2020-06-30 ENCOUNTER — PATIENT MESSAGE (OUTPATIENT)
Dept: HEALTH INFORMATION MANAGEMENT | Facility: OTHER | Age: 70
End: 2020-06-30

## 2020-07-10 ENCOUNTER — OFFICE VISIT (OUTPATIENT)
Dept: CARDIOLOGY | Facility: MEDICAL CENTER | Age: 70
End: 2020-07-10
Payer: MEDICARE

## 2020-07-10 VITALS
WEIGHT: 200.6 LBS | HEIGHT: 65 IN | BODY MASS INDEX: 33.42 KG/M2 | OXYGEN SATURATION: 92 % | DIASTOLIC BLOOD PRESSURE: 78 MMHG | SYSTOLIC BLOOD PRESSURE: 124 MMHG | HEART RATE: 88 BPM

## 2020-07-10 DIAGNOSIS — I10 ESSENTIAL HYPERTENSION: ICD-10-CM

## 2020-07-10 DIAGNOSIS — Z79.899 ENCOUNTER FOR LONG-TERM (CURRENT) USE OF HIGH-RISK MEDICATION: ICD-10-CM

## 2020-07-10 DIAGNOSIS — E78.2 MIXED HYPERLIPIDEMIA: ICD-10-CM

## 2020-07-10 DIAGNOSIS — Z86.79 HISTORY OF SINUS TACHYCARDIA: ICD-10-CM

## 2020-07-10 PROCEDURE — 99215 OFFICE O/P EST HI 40 MIN: CPT | Performed by: INTERNAL MEDICINE

## 2020-07-10 RX ORDER — LIDOCAINE AND PRILOCAINE 25; 25 MG/G; MG/G
CREAM TOPICAL
Status: ON HOLD | COMMUNITY
Start: 2020-07-01 | End: 2020-07-21

## 2020-07-10 RX ORDER — ATORVASTATIN CALCIUM 20 MG/1
TABLET, FILM COATED ORAL
COMMUNITY
Start: 2020-05-08 | End: 2023-02-15 | Stop reason: SDUPTHER

## 2020-07-10 RX ORDER — HYDROCHLOROTHIAZIDE 12.5 MG/1
CAPSULE, GELATIN COATED ORAL
COMMUNITY
Start: 2020-05-08 | End: 2020-07-10

## 2020-07-10 ASSESSMENT — FIBROSIS 4 INDEX: FIB4 SCORE: 1.99

## 2020-07-10 ASSESSMENT — ENCOUNTER SYMPTOMS
LOSS OF CONSCIOUSNESS: 0
FALLS: 0
SHORTNESS OF BREATH: 0
ORTHOPNEA: 0
PND: 0
DEPRESSION: 0
ABDOMINAL PAIN: 0
DIZZINESS: 0
PALPITATIONS: 0

## 2020-07-10 NOTE — PROGRESS NOTES
"Chief Complaint   Patient presents with   • HTN (Controlled)   • Hyperlipidemia       Subjective:   Fiona Daniels is a 70 y.o. female who presents today to follow-up on sinus tachycardia.    Patient was previously seen by Dr. Worthington and MATTHEW Cole and appears that she has been diagnosed with inappropriate sinus tachycardia for which she has been started on metoprolol.  Previously her heart rate had been in the 100s almost all the time.  Since being on metoprolol it has significantly improved and ranges in the 80s to 90s beats per minute.  Patient denies any history of palpitations.    Her blood pressure is usually well controlled, like today.    For hyperlipidemia she is on Lipitor which she is tolerating well.    She was recently diagnosed with breast cancer and understandably worries about her wellbeing.  She is scheduled to undergo surgery later this month.    She is a retired nurse.    Past Medical History:   Diagnosis Date   • Anesthesia     mother  \"dead for a minute\"   • ASTHMA    • Hepatitis A     age 6   • Hypertension      Past Surgical History:   Procedure Laterality Date   • ETHMOIDECTOMY  2/14/2012    Performed by GÓMEZ VALDERRAMA at [facility]ERY [facility]   • NASAL POLYPECTOMY  2/14/2012    Performed by GÓMEZ VALDERRAMA at [facility]   • NASAL SEPTAL RECONSTRUCTION  2/14/2012    Performed by GÓMEZ VALDERRAMA at [facility]   • OTHER ORTHOPEDIC SURGERY      ORIF right wrist     History reviewed. No pertinent family history.  Social History     Socioeconomic History   • Marital status:      Spouse name: Not on file   • Number of children: Not on file   • Years of education: Not on file   • Highest education level: Not on file   Occupational History   • Not on file   Social Needs   • Financial resource strain: Not on file   • Food insecurity     Worry: Not on file     Inability: Not on file   • Transportation needs     Medical: Not on file     " Non-medical: Not on file   Tobacco Use   • Smoking status: Never Smoker   • Smokeless tobacco: Never Used   Substance and Sexual Activity   • Alcohol use: No   • Drug use: No   • Sexual activity: Not on file   Lifestyle   • Physical activity     Days per week: Not on file     Minutes per session: Not on file   • Stress: Not on file   Relationships   • Social connections     Talks on phone: Not on file     Gets together: Not on file     Attends Anabaptism service: Not on file     Active member of club or organization: Not on file     Attends meetings of clubs or organizations: Not on file     Relationship status: Not on file   • Intimate partner violence     Fear of current or ex partner: Not on file     Emotionally abused: Not on file     Physically abused: Not on file     Forced sexual activity: Not on file   Other Topics Concern   • Not on file   Social History Narrative   • Not on file     Allergies   Allergen Reactions   • Aspirin Anaphylaxis   • Codeine    • Moxifloxacin Hcl In Nacl Rash   • Robitussin Dm [Guiatuss Dm] Anaphylaxis     Outpatient Encounter Medications as of 7/10/2020   Medication Sig Dispense Refill   • atorvastatin (LIPITOR) 20 MG Tab      • lidocaine-prilocaine (EMLA) 2.5-2.5 % Cream APPLY TOPICALLY 1 HOUR PRIOR TO PROCEDURE.     • metoprolol SR (TOPROL XL) 25 MG TABLET SR 24 HR Take 1 Tab by mouth every day. 30 Tab 11   • trandolapril (MAVIK) 4 MG Tab TK 2 T PO QD  0   • hydrochlorothiazide (HYDRODIURIL) 12.5 MG tablet Take 12.5 mg by mouth every day.     • albuterol (VENTOLIN OR PROVENTIL) 108 (90 BASE) MCG/ACT AERS Inhale 2 Puffs by mouth every four hours as needed for Shortness of Breath. 1 Inhaler 3   • fluticasone-salmeterol (ADVAIR DISKUS) 100-50 MCG/DOSE AEPB Inhale 1 Puff by mouth every day.     • Multiple Vitamins-Minerals (ONE-A-DAY 50 PLUS PO) Take  by mouth every day.     • Cholecalciferol (VITAMIN D3 PO) Take  by mouth every day.     • CALCIUM PO Take  by mouth every day. Pt does  "not know dose     • [DISCONTINUED] hydrochlorothiazide (MICROZIDE) 12.5 MG capsule      • [DISCONTINUED] atorvastatin (LIPITOR) 10 MG TABS Take 20 mg by mouth every evening.       No facility-administered encounter medications on file as of 7/10/2020.      Review of Systems   Constitutional: Negative for malaise/fatigue.   Respiratory: Negative for shortness of breath.    Cardiovascular: Negative for chest pain, palpitations, orthopnea, leg swelling and PND.   Gastrointestinal: Negative for abdominal pain.   Musculoskeletal: Negative for falls.   Neurological: Negative for dizziness and loss of consciousness.   Psychiatric/Behavioral: Negative for depression.   All other systems reviewed and are negative.       Objective:   /78 (BP Location: Left arm, Patient Position: Sitting, BP Cuff Size: Adult)   Pulse 88   Ht 1.651 m (5' 5\")   Wt 91 kg (200 lb 9.6 oz)   SpO2 92%   BMI 33.38 kg/m²     Physical Exam   Constitutional: She is oriented to person, place, and time. She appears well-developed and well-nourished. No distress.   HENT:   Head: Normocephalic and atraumatic.   Eyes: Conjunctivae are normal. No scleral icterus.   Neck: Normal range of motion. Neck supple.   Cardiovascular: Normal rate, regular rhythm and normal heart sounds. Exam reveals no gallop and no friction rub.   No murmur heard.  Pulmonary/Chest: Effort normal and breath sounds normal. No respiratory distress. She has no wheezes. She has no rales.   Musculoskeletal:         General: No edema.   Neurological: She is alert and oriented to person, place, and time.   Skin: Skin is warm and dry. She is not diaphoretic.   Psychiatric: She has a normal mood and affect. Her behavior is normal. Judgment and thought content normal.   Nursing note and vitals reviewed.    Labs performed May 2020 were reviewed and showed normal creatinine.  LDL 79.    Echocardiogram performed November 2019 was personally reviewed and per my interpretation showed normal " LV systolic function.    Cardiac PET performed December 2019.  Report was reviewed and showed no fixed or reversible defects noted.  Transient ischemic dilatation with a ratio of 2.0 noted.  EF 79% at rest, 89% with stress.    Assessment:     1. History of sinus tachycardia     2. Essential hypertension     3. Mixed hyperlipidemia     4. Encounter for long-term (current) use of high-risk medication         Medical Decision Making:  Today's Assessment / Status / Plan:     Likely inappropriate sinus tachycardia.  Patient is symptomatically much improved with metoprolol.  Continue current dose.    Regarding hypertension, her blood pressure is at goal.  Continue hydrochlorothiazide and trandolapril at current dose.  Her labs are normal.    For hyperlipidemia, continue Lipitor at current dose.  Her LDL is at goal.    Return to clinic in 6 months or earlier if needed.    Thank you for allowing me to participate in the care of this patient. Please do not hesitate to contact me with any questions.    Lindsay Laureano MD, [facility]  Cardiologist  [facility]    PLEASE NOTE: This dictation was created using voice recognition software.

## 2020-07-10 NOTE — LETTER
"     [facility]   [address], [address]  [place] [address]  Phone: [phone]  Fax: [phone]              Fiona Daniels  1950    Encounter Date: 7/10/2020    Lindsay Laureano M.D.          PROGRESS NOTE:  Chief Complaint   Patient presents with   • HTN (Controlled)   • Hyperlipidemia       Subjective:   Fiona Daniels is a 70 y.o. female who presents today to follow-up on sinus tachycardia.    Patient was previously seen by Dr. Worthington and MATTHEW Cole and appears that she has been diagnosed with inappropriate sinus tachycardia for which she has been started on metoprolol.  Previously her heart rate had been in the 100s almost all the time.  Since being on metoprolol it has significantly improved and ranges in the 80s to 90s beats per minute.  Patient denies any history of palpitations.    Her blood pressure is usually well controlled, like today.    For hyperlipidemia she is on Lipitor which she is tolerating well.    She was recently diagnosed with breast cancer and understandably worries about her wellbeing.  She is scheduled to undergo surgery later this month.    She is a retired nurse.    Past Medical History:   Diagnosis Date   • Anesthesia     mother  \"dead for a minute\"   • ASTHMA    • Hepatitis A     age 6   • Hypertension      Past Surgical History:   Procedure Laterality Date   • ETHMOIDECTOMY  2/14/2012    Performed by GÓMEZ VALDERRAMA at [facility]ERY [facility]   • NASAL POLYPECTOMY  2/14/2012    Performed by GÓMEZ VALDERRAMA at [facility]   • NASAL SEPTAL RECONSTRUCTION  2/14/2012    Performed by GÓMEZ VALDERRAMA at [facility]ERY [facility]   • OTHER ORTHOPEDIC SURGERY      ORIF right wrist     History reviewed. No pertinent family history.  Social History     Socioeconomic History   • Marital status:      Spouse name: Not on file   • Number of children: Not on file   • Years of education: Not on file   • " Highest education level: Not on file   Occupational History   • Not on file   Social Needs   • Financial resource strain: Not on file   • Food insecurity     Worry: Not on file     Inability: Not on file   • Transportation needs     Medical: Not on file     Non-medical: Not on file   Tobacco Use   • Smoking status: Never Smoker   • Smokeless tobacco: Never Used   Substance and Sexual Activity   • Alcohol use: No   • Drug use: No   • Sexual activity: Not on file   Lifestyle   • Physical activity     Days per week: Not on file     Minutes per session: Not on file   • Stress: Not on file   Relationships   • Social connections     Talks on phone: Not on file     Gets together: Not on file     Attends Scientologist service: Not on file     Active member of club or organization: Not on file     Attends meetings of clubs or organizations: Not on file     Relationship status: Not on file   • Intimate partner violence     Fear of current or ex partner: Not on file     Emotionally abused: Not on file     Physically abused: Not on file     Forced sexual activity: Not on file   Other Topics Concern   • Not on file   Social History Narrative   • Not on file     Allergies   Allergen Reactions   • Aspirin Anaphylaxis   • Codeine    • Moxifloxacin Hcl In Nacl Rash   • Robitussin Dm [Guiatuss Dm] Anaphylaxis     Outpatient Encounter Medications as of 7/10/2020   Medication Sig Dispense Refill   • atorvastatin (LIPITOR) 20 MG Tab      • lidocaine-prilocaine (EMLA) 2.5-2.5 % Cream APPLY TOPICALLY 1 HOUR PRIOR TO PROCEDURE.     • metoprolol SR (TOPROL XL) 25 MG TABLET SR 24 HR Take 1 Tab by mouth every day. 30 Tab 11   • trandolapril (MAVIK) 4 MG Tab TK 2 T PO QD  0   • hydrochlorothiazide (HYDRODIURIL) 12.5 MG tablet Take 12.5 mg by mouth every day.     • albuterol (VENTOLIN OR PROVENTIL) 108 (90 BASE) MCG/ACT AERS Inhale 2 Puffs by mouth every four hours as needed for Shortness of Breath. 1 Inhaler 3   • fluticasone-salmeterol (ADVAIR  "DISKUS) 100-50 MCG/DOSE AEPB Inhale 1 Puff by mouth every day.     • Multiple Vitamins-Minerals (ONE-A-DAY 50 PLUS PO) Take  by mouth every day.     • Cholecalciferol (VITAMIN D3 PO) Take  by mouth every day.     • CALCIUM PO Take  by mouth every day. Pt does not know dose     • [DISCONTINUED] hydrochlorothiazide (MICROZIDE) 12.5 MG capsule      • [DISCONTINUED] atorvastatin (LIPITOR) 10 MG TABS Take 20 mg by mouth every evening.       No facility-administered encounter medications on file as of 7/10/2020.      Review of Systems   Constitutional: Negative for malaise/fatigue.   Respiratory: Negative for shortness of breath.    Cardiovascular: Negative for chest pain, palpitations, orthopnea, leg swelling and PND.   Gastrointestinal: Negative for abdominal pain.   Musculoskeletal: Negative for falls.   Neurological: Negative for dizziness and loss of consciousness.   Psychiatric/Behavioral: Negative for depression.   All other systems reviewed and are negative.       Objective:   /78 (BP Location: Left arm, Patient Position: Sitting, BP Cuff Size: Adult)   Pulse 88   Ht 1.651 m (5' 5\")   Wt 91 kg (200 lb 9.6 oz)   SpO2 92%   BMI 33.38 kg/m²     Physical Exam   Constitutional: She is oriented to person, place, and time. She appears well-developed and well-nourished. No distress.   HENT:   Head: Normocephalic and atraumatic.   Eyes: Conjunctivae are normal. No scleral icterus.   Neck: Normal range of motion. Neck supple.   Cardiovascular: Normal rate, regular rhythm and normal heart sounds. Exam reveals no gallop and no friction rub.   No murmur heard.  Pulmonary/Chest: Effort normal and breath sounds normal. No respiratory distress. She has no wheezes. She has no rales.   Musculoskeletal:         General: No edema.   Neurological: She is alert and oriented to person, place, and time.   Skin: Skin is warm and dry. She is not diaphoretic.   Psychiatric: She has a normal mood and affect. Her behavior is " normal. Judgment and thought content normal.   Nursing note and vitals reviewed.    Labs performed May 2020 were reviewed and showed normal creatinine.  LDL 79.    Echocardiogram performed November 2019 was personally reviewed and per my interpretation showed normal LV systolic function.    Cardiac PET performed December 2019.  Report was reviewed and showed no fixed or reversible defects noted.  Transient ischemic dilatation with a ratio of 2.0 noted.  EF 79% at rest, 89% with stress.    Assessment:     1. History of sinus tachycardia     2. Essential hypertension     3. Mixed hyperlipidemia     4. Encounter for long-term (current) use of high-risk medication         Medical Decision Making:  Today's Assessment / Status / Plan:     Likely inappropriate sinus tachycardia.  Patient is symptomatically much improved with metoprolol.  Continue current dose.    Regarding hypertension, her blood pressure is at goal.  Continue hydrochlorothiazide and trandolapril at current dose.  Her labs are normal.    For hyperlipidemia, continue Lipitor at current dose.  Her LDL is at goal.    Return to clinic in 6 months or earlier if needed.    Thank you for allowing me to participate in the care of this patient. Please do not hesitate to contact me with any questions.    Lindsay Laureano MD, [facility]  Cardiologist  [facility]    PLEASE NOTE: This dictation was created using voice recognition software.         Nba Rodríguez D.O.  [address] [place] [id number]  VIA Facsimile: [phone]

## 2020-07-13 ENCOUNTER — TELEPHONE (OUTPATIENT)
Dept: CARDIOLOGY | Facility: MEDICAL CENTER | Age: 70
End: 2020-07-13

## 2020-07-13 NOTE — TELEPHONE ENCOUNTER
NO PAR needed  Medication covered by the plan:  Fiona Daniels Silvestre: [id number] help? Call us at [phone]  Outcome  Additional Information Required  Drug is covered by current benefit plan. No further PA activity needed  Drug  Metoprolol Succinate ER 25MG er tablets  Form   Electronic PA Form

## 2020-07-14 DIAGNOSIS — I10 ESSENTIAL HYPERTENSION: Primary | ICD-10-CM

## 2020-07-14 RX ORDER — METOPROLOL SUCCINATE 25 MG/1
25 TABLET, EXTENDED RELEASE ORAL DAILY
Qty: 90 TAB | Refills: 2 | Status: SHIPPED | OUTPATIENT
Start: 2020-07-14 | End: 2021-04-21

## 2020-07-16 ENCOUNTER — OFFICE VISIT (OUTPATIENT)
Dept: ADMISSIONS | Facility: MEDICAL CENTER | Age: 70
End: 2020-07-16
Attending: SURGERY
Payer: MEDICARE

## 2020-07-16 DIAGNOSIS — Z01.812 PRE-OPERATIVE LABORATORY EXAMINATION: ICD-10-CM

## 2020-07-16 DIAGNOSIS — Z01.810 PRE-OPERATIVE CARDIOVASCULAR EXAMINATION: ICD-10-CM

## 2020-07-16 LAB
ANION GAP SERPL CALC-SCNC: 11 MMOL/L (ref 7–16)
BUN SERPL-MCNC: 17 MG/DL (ref 8–22)
CALCIUM SERPL-MCNC: 10.1 MG/DL (ref 8.5–10.5)
CHLORIDE SERPL-SCNC: 104 MMOL/L (ref 96–112)
CO2 SERPL-SCNC: 29 MMOL/L (ref 20–33)
COVID ORDER STATUS COVID19: NORMAL
CREAT SERPL-MCNC: 0.73 MG/DL (ref 0.5–1.4)
EKG IMPRESSION: NORMAL
GLUCOSE SERPL-MCNC: 102 MG/DL (ref 65–99)
POTASSIUM SERPL-SCNC: 4.7 MMOL/L (ref 3.6–5.5)
SODIUM SERPL-SCNC: 144 MMOL/L (ref 135–145)

## 2020-07-16 PROCEDURE — 93005 ELECTROCARDIOGRAM TRACING: CPT

## 2020-07-16 PROCEDURE — 93010 ELECTROCARDIOGRAM REPORT: CPT | Performed by: INTERNAL MEDICINE

## 2020-07-16 PROCEDURE — U0003 INFECTIOUS AGENT DETECTION BY NUCLEIC ACID (DNA OR RNA); SEVERE ACUTE RESPIRATORY SYNDROME CORONAVIRUS 2 (SARS-COV-2) (CORONAVIRUS DISEASE [COVID-19]), AMPLIFIED PROBE TECHNIQUE, MAKING USE OF HIGH THROUGHPUT TECHNOLOGIES AS DESCRIBED BY CMS-2020-01-R: HCPCS

## 2020-07-16 PROCEDURE — 80048 BASIC METABOLIC PNL TOTAL CA: CPT

## 2020-07-16 PROCEDURE — 36415 COLL VENOUS BLD VENIPUNCTURE: CPT

## 2020-07-16 ASSESSMENT — FIBROSIS 4 INDEX: FIB4 SCORE: 1.99

## 2020-07-17 LAB
SARS-COV-2 RNA RESP QL NAA+PROBE: NOTDETECTED
SPECIMEN SOURCE: NORMAL

## 2020-07-19 NOTE — PROGRESS NOTES
"Non face to face prolonged services of clinical data and chart information reviewed for a total time of 30 performed on 7/17 and 7/18 for Fiona Daniels  Reviewed were:    Referral    Previous encounters    Imaging all mammography, colonoscopy, CT/tomography, MRI and PET studies    Previous procedures all biopsy and surgical procedures including pathology analysis and interpretation    Notes    Media all personal and family clinical information from outside institutions including germline and somatic DNA sequencing studies    Miscellaneous reports    Patient summaries family history as documented by referring clinician  Counseling, testing information and materials prepared  \"I spent 30 minutes  from 0790 to 0730 reviewing past records, prior to visit pertaining to genetic risk.\"   Len Maher M.D.  edited  "

## 2020-07-20 ENCOUNTER — OFFICE VISIT (OUTPATIENT)
Dept: HEMATOLOGY ONCOLOGY | Facility: MEDICAL CENTER | Age: 70
End: 2020-07-20
Payer: MEDICARE

## 2020-07-20 VITALS
TEMPERATURE: 99.3 F | WEIGHT: 201.39 LBS | HEIGHT: 66 IN | BODY MASS INDEX: 32.37 KG/M2 | OXYGEN SATURATION: 92 % | RESPIRATION RATE: 18 BRPM | SYSTOLIC BLOOD PRESSURE: 124 MMHG | DIASTOLIC BLOOD PRESSURE: 74 MMHG | HEART RATE: 88 BPM

## 2020-07-20 DIAGNOSIS — Z80.0 FAMILY HISTORY OF COLON CANCER: ICD-10-CM

## 2020-07-20 DIAGNOSIS — Z80.51 FAMILY HISTORY OF RENAL CANCER: ICD-10-CM

## 2020-07-20 DIAGNOSIS — Z80.0 FAMILY HISTORY OF RECTAL CANCER: ICD-10-CM

## 2020-07-20 DIAGNOSIS — C50.412 MALIGNANT NEOPLASM OF UPPER-OUTER QUADRANT OF LEFT FEMALE BREAST, UNSPECIFIED ESTROGEN RECEPTOR STATUS (HCC): ICD-10-CM

## 2020-07-20 PROCEDURE — 99358 PROLONG SERVICE W/O CONTACT: CPT | Performed by: MEDICAL GENETICS

## 2020-07-20 PROCEDURE — 99203 OFFICE O/P NEW LOW 30 MIN: CPT | Performed by: MEDICAL GENETICS

## 2020-07-20 ASSESSMENT — PATIENT HEALTH QUESTIONNAIRE - PHQ9: CLINICAL INTERPRETATION OF PHQ2 SCORE: 0

## 2020-07-20 ASSESSMENT — FIBROSIS 4 INDEX: FIB4 SCORE: 1.99

## 2020-07-20 NOTE — OR NURSING
COVID-19 Pre-surgery screenin. Do you have an undiagnosed respiratory illness or symptoms such as coughing or sneezing? No  a. Onset of Sx No  b. Acute vs. chronic respiratory illness No    2. Do you have an unexplained fever greater than 100.4 degrees Fahrenheit or 38 degrees Celsius?     No     3. Have you had direct exposure to a patient who tested positive for Covid-19?    No     4. Have you traveled outside [facility] in the last 14 days? No    5. Have you had any loss of your sense of taste or smell? Have you had N/V or sore throat? No    Patient has been informed of visitor policy and asked to wear a mask upon entering the hospital   Yes

## 2020-07-20 NOTE — PROGRESS NOTES
GENETIC RISK ASSESSMENT    Fiona Daniels is a 70 y.o. year old patient who was referred by Marcos for cancer genetic risk assessment.    Chief Complaint: breast cancer and family history of cancer    HPI: The patient was well until 2 weeks ago at which time a suspicious physical exam caused the patient to be referred for imaging ). A suspicious (mammogram study identified a (left) breast mass. A biopsy  was performed and a specimen was submitted to pathology.     A diagnosis of  carcinoma was made.   The patient's mother (rectal), uncle (m-colon), GF (p- renal)  Review of personal and family histories suggested that a cancer genetic risk assessment was in order.    Review of Systems (ROS):  Constitutional N  Eyes N  ENT N   Respiratory N  Cardiovascular N  Gastrointestinal N  Genitourinary N  Musculoskeletal N  Skin N  Neurological N  Endocrine N  Psychiatric N  Hematologic/lymphatic N  Allergic/Immunologic asa, codeine, avalax  All other systems reviewed and are negative.    History (Past/Family)  Family History:   No family history on file.   3 generation pedigree built   Yes   Tim empiric risk calculation No   Gael II empiric risk calculation  No    Social History:       Social History     Socioeconomic History   • Marital status:      Spouse name: Not on file   • Number of children: Not on file   • Years of education: Not on file   • Highest education level: Not on file   Occupational History   • Not on file   Social Needs   • Financial resource strain: Not on file   • Food insecurity     Worry: Not on file     Inability: Not on file   • Transportation needs     Medical: Not on file     Non-medical: Not on file   Tobacco Use   • Smoking status: Never Smoker   • Smokeless tobacco: Never Used   Substance and Sexual Activity   • Alcohol use: No   • Drug use: No   • Sexual activity: Not on file   Lifestyle   • Physical activity     Days per week: Not on file     Minutes per session: Not on file  "  • Stress: Not on file   Relationships   • Social connections     Talks on phone: Not on file     Gets together: Not on file     Attends Spiritism service: Not on file     Active member of club or organization: Not on file     Attends meetings of clubs or organizations: Not on file     Relationship status: Not on file   • Intimate partner violence     Fear of current or ex partner: Not on file     Emotionally abused: Not on file     Physically abused: Not on file     Forced sexual activity: Not on file   Other Topics Concern   • Not on file   Social History Narrative   • Not on file       Patient Past History:  Past Medical History:   Diagnosis Date   • Anesthesia     mother  \"dead for a minute\"   • Arthritis 07/16/2020    hx Gout   • ASTHMA    • Hepatitis A     age 6   • Hypertension    • Pain 07/16/2020    back pain           NCCN Testing Criteria Met                            Yes    Physical Exam  Constitutional    There were no vitals taken for this visit.        No PE done for covid 19 precautions   Assessment and Plan:  Patient with recent cancer diagnosis and family history of cancer    I spent a total of 30 minutes of face to face time with >50% of time spent in counseling and coordination of care discussing matters stated in above note.    Melo panel ordered      Len Maher M.D.  "

## 2020-07-20 NOTE — NON-PROVIDER
Kit was sent home with patient to complete and send off to [facility] via [employer] due to COVID19 and Provider's preference.     Instructed patient on how to collect successful saliva specimen, all necessary documents were sent with the kit. Patient agreed and verbalized understanding.

## 2020-07-21 ENCOUNTER — ANESTHESIA (OUTPATIENT)
Dept: SURGERY | Facility: MEDICAL CENTER | Age: 70
End: 2020-07-21
Payer: MEDICARE

## 2020-07-21 ENCOUNTER — APPOINTMENT (OUTPATIENT)
Dept: RADIOLOGY | Facility: MEDICAL CENTER | Age: 70
End: 2020-07-21
Attending: SURGERY
Payer: MEDICARE

## 2020-07-21 ENCOUNTER — ANESTHESIA EVENT (OUTPATIENT)
Dept: SURGERY | Facility: MEDICAL CENTER | Age: 70
End: 2020-07-21
Payer: MEDICARE

## 2020-07-21 ENCOUNTER — HOSPITAL ENCOUNTER (OUTPATIENT)
Facility: MEDICAL CENTER | Age: 70
End: 2020-07-21
Attending: SURGERY | Admitting: SURGERY
Payer: MEDICARE

## 2020-07-21 VITALS
BODY MASS INDEX: 32.1 KG/M2 | RESPIRATION RATE: 16 BRPM | OXYGEN SATURATION: 99 % | HEART RATE: 91 BPM | HEIGHT: 66 IN | SYSTOLIC BLOOD PRESSURE: 116 MMHG | WEIGHT: 199.74 LBS | DIASTOLIC BLOOD PRESSURE: 68 MMHG | TEMPERATURE: 97.6 F

## 2020-07-21 DIAGNOSIS — Z17.0 MALIGNANT NEOPLASM OF UPPER-OUTER QUADRANT OF LEFT BREAST IN FEMALE, ESTROGEN RECEPTOR POSITIVE (HCC): ICD-10-CM

## 2020-07-21 DIAGNOSIS — C50.412 MALIGNANT NEOPLASM OF UPPER-OUTER QUADRANT OF LEFT BREAST IN FEMALE, ESTROGEN RECEPTOR POSITIVE (HCC): ICD-10-CM

## 2020-07-21 PROBLEM — E66.9 OBESITY (BMI 30-39.9): Status: ACTIVE | Noted: 2020-07-21

## 2020-07-21 PROBLEM — J45.909 MILD ASTHMA: Status: ACTIVE | Noted: 2020-07-21

## 2020-07-21 PROCEDURE — 160002 HCHG RECOVERY MINUTES (STAT): Performed by: SURGERY

## 2020-07-21 PROCEDURE — 700102 HCHG RX REV CODE 250 W/ 637 OVERRIDE(OP): Performed by: SURGERY

## 2020-07-21 PROCEDURE — 160041 HCHG SURGERY MINUTES - EA ADDL 1 MIN LEVEL 4: Performed by: SURGERY

## 2020-07-21 PROCEDURE — 19281 PERQ DEVICE BREAST 1ST IMAG: CPT | Mod: LT

## 2020-07-21 PROCEDURE — 501838 HCHG SUTURE GENERAL: Performed by: SURGERY

## 2020-07-21 PROCEDURE — 160029 HCHG SURGERY MINUTES - 1ST 30 MINS LEVEL 4: Performed by: SURGERY

## 2020-07-21 PROCEDURE — 501497 HCHG SURGICLIP: Performed by: SURGERY

## 2020-07-21 PROCEDURE — A9270 NON-COVERED ITEM OR SERVICE: HCPCS | Performed by: ANESTHESIOLOGY

## 2020-07-21 PROCEDURE — 700111 HCHG RX REV CODE 636 W/ 250 OVERRIDE (IP): Performed by: ANESTHESIOLOGY

## 2020-07-21 PROCEDURE — 160046 HCHG PACU - 1ST 60 MINS PHASE II: Performed by: SURGERY

## 2020-07-21 PROCEDURE — 160035 HCHG PACU - 1ST 60 MINS PHASE I: Performed by: SURGERY

## 2020-07-21 PROCEDURE — A9270 NON-COVERED ITEM OR SERVICE: HCPCS | Performed by: SURGERY

## 2020-07-21 PROCEDURE — 88305 TISSUE EXAM BY PATHOLOGIST: CPT

## 2020-07-21 PROCEDURE — 38792 RA TRACER ID OF SENTINL NODE: CPT | Mod: LT

## 2020-07-21 PROCEDURE — 88307 TISSUE EXAM BY PATHOLOGIST: CPT

## 2020-07-21 PROCEDURE — 160048 HCHG OR STATISTICAL LEVEL 1-5: Performed by: SURGERY

## 2020-07-21 PROCEDURE — 160025 RECOVERY II MINUTES (STATS): Performed by: SURGERY

## 2020-07-21 PROCEDURE — 160009 HCHG ANES TIME/MIN: Performed by: SURGERY

## 2020-07-21 PROCEDURE — 500373 HCHG DRAIN, J-P FLAT 10MM 7044: Performed by: SURGERY

## 2020-07-21 PROCEDURE — 700101 HCHG RX REV CODE 250: Performed by: SURGERY

## 2020-07-21 PROCEDURE — 500389 HCHG DRAIN, RESERVOIR SUCT JP 100CC: Performed by: SURGERY

## 2020-07-21 PROCEDURE — 700102 HCHG RX REV CODE 250 W/ 637 OVERRIDE(OP): Performed by: ANESTHESIOLOGY

## 2020-07-21 PROCEDURE — 88331 PATH CONSLTJ SURG 1 BLK 1SPC: CPT

## 2020-07-21 PROCEDURE — 700105 HCHG RX REV CODE 258: Performed by: SURGERY

## 2020-07-21 PROCEDURE — 500054 HCHG BANDAGE, ELASTIC 6: Performed by: SURGERY

## 2020-07-21 PROCEDURE — 160047 HCHG PACU  - EA ADDL 30 MINS PHASE II: Performed by: SURGERY

## 2020-07-21 PROCEDURE — 700101 HCHG RX REV CODE 250: Performed by: ANESTHESIOLOGY

## 2020-07-21 RX ORDER — PHENYLEPHRINE HCL IN 0.9% NACL 0.5 MG/5ML
SYRINGE (ML) INTRAVENOUS PRN
Status: DISCONTINUED | OUTPATIENT
Start: 2020-07-21 | End: 2020-07-21 | Stop reason: SURG

## 2020-07-21 RX ORDER — CEFAZOLIN SODIUM 1 G/3ML
INJECTION, POWDER, FOR SOLUTION INTRAMUSCULAR; INTRAVENOUS PRN
Status: DISCONTINUED | OUTPATIENT
Start: 2020-07-21 | End: 2020-07-21 | Stop reason: SURG

## 2020-07-21 RX ORDER — DEXAMETHASONE SODIUM PHOSPHATE 4 MG/ML
INJECTION, SOLUTION INTRA-ARTICULAR; INTRALESIONAL; INTRAMUSCULAR; INTRAVENOUS; SOFT TISSUE PRN
Status: DISCONTINUED | OUTPATIENT
Start: 2020-07-21 | End: 2020-07-21 | Stop reason: SURG

## 2020-07-21 RX ORDER — MIDAZOLAM HYDROCHLORIDE 1 MG/ML
INJECTION INTRAMUSCULAR; INTRAVENOUS PRN
Status: DISCONTINUED | OUTPATIENT
Start: 2020-07-21 | End: 2020-07-21 | Stop reason: SURG

## 2020-07-21 RX ORDER — OXYCODONE HCL 5 MG/5 ML
10 SOLUTION, ORAL ORAL
Status: COMPLETED | OUTPATIENT
Start: 2020-07-21 | End: 2020-07-21

## 2020-07-21 RX ORDER — SODIUM CHLORIDE, SODIUM LACTATE, POTASSIUM CHLORIDE, CALCIUM CHLORIDE 600; 310; 30; 20 MG/100ML; MG/100ML; MG/100ML; MG/100ML
INJECTION, SOLUTION INTRAVENOUS CONTINUOUS
Status: DISCONTINUED | OUTPATIENT
Start: 2020-07-21 | End: 2020-07-21 | Stop reason: HOSPADM

## 2020-07-21 RX ORDER — HYDROMORPHONE HYDROCHLORIDE 1 MG/ML
0.4 INJECTION, SOLUTION INTRAMUSCULAR; INTRAVENOUS; SUBCUTANEOUS
Status: DISCONTINUED | OUTPATIENT
Start: 2020-07-21 | End: 2020-07-21 | Stop reason: HOSPADM

## 2020-07-21 RX ORDER — GABAPENTIN 300 MG/1
300 CAPSULE ORAL ONCE
Status: COMPLETED | OUTPATIENT
Start: 2020-07-21 | End: 2020-07-21

## 2020-07-21 RX ORDER — ONDANSETRON 2 MG/ML
INJECTION INTRAMUSCULAR; INTRAVENOUS PRN
Status: DISCONTINUED | OUTPATIENT
Start: 2020-07-21 | End: 2020-07-21 | Stop reason: SURG

## 2020-07-21 RX ORDER — OXYCODONE HCL 5 MG/5 ML
5 SOLUTION, ORAL ORAL
Status: COMPLETED | OUTPATIENT
Start: 2020-07-21 | End: 2020-07-21

## 2020-07-21 RX ORDER — LABETALOL HYDROCHLORIDE 5 MG/ML
INJECTION, SOLUTION INTRAVENOUS PRN
Status: DISCONTINUED | OUTPATIENT
Start: 2020-07-21 | End: 2020-07-21 | Stop reason: SURG

## 2020-07-21 RX ORDER — ALPRAZOLAM 0.25 MG/1
0.5 TABLET ORAL
Status: COMPLETED | OUTPATIENT
Start: 2020-07-21 | End: 2020-07-21

## 2020-07-21 RX ORDER — HYDROMORPHONE HYDROCHLORIDE 1 MG/ML
0.1 INJECTION, SOLUTION INTRAMUSCULAR; INTRAVENOUS; SUBCUTANEOUS
Status: DISCONTINUED | OUTPATIENT
Start: 2020-07-21 | End: 2020-07-21 | Stop reason: HOSPADM

## 2020-07-21 RX ORDER — HYDROMORPHONE HYDROCHLORIDE 1 MG/ML
0.2 INJECTION, SOLUTION INTRAMUSCULAR; INTRAVENOUS; SUBCUTANEOUS
Status: DISCONTINUED | OUTPATIENT
Start: 2020-07-21 | End: 2020-07-21 | Stop reason: HOSPADM

## 2020-07-21 RX ORDER — ACETAMINOPHEN 500 MG
1000 TABLET ORAL ONCE
Status: COMPLETED | OUTPATIENT
Start: 2020-07-21 | End: 2020-07-21

## 2020-07-21 RX ORDER — ONDANSETRON 2 MG/ML
4 INJECTION INTRAMUSCULAR; INTRAVENOUS
Status: COMPLETED | OUTPATIENT
Start: 2020-07-21 | End: 2020-07-21

## 2020-07-21 RX ORDER — BUPIVACAINE HYDROCHLORIDE AND EPINEPHRINE 5; 5 MG/ML; UG/ML
INJECTION, SOLUTION EPIDURAL; INTRACAUDAL; PERINEURAL
Status: DISCONTINUED | OUTPATIENT
Start: 2020-07-21 | End: 2020-07-21 | Stop reason: HOSPADM

## 2020-07-21 RX ORDER — MEPERIDINE HYDROCHLORIDE 25 MG/ML
12.5 INJECTION INTRAMUSCULAR; INTRAVENOUS; SUBCUTANEOUS
Status: DISCONTINUED | OUTPATIENT
Start: 2020-07-21 | End: 2020-07-21 | Stop reason: HOSPADM

## 2020-07-21 RX ORDER — LIDOCAINE HYDROCHLORIDE 20 MG/ML
INJECTION, SOLUTION EPIDURAL; INFILTRATION; INTRACAUDAL; PERINEURAL PRN
Status: DISCONTINUED | OUTPATIENT
Start: 2020-07-21 | End: 2020-07-21 | Stop reason: SURG

## 2020-07-21 RX ORDER — SODIUM CHLORIDE, SODIUM LACTATE, POTASSIUM CHLORIDE, CALCIUM CHLORIDE 600; 310; 30; 20 MG/100ML; MG/100ML; MG/100ML; MG/100ML
INJECTION, SOLUTION INTRAVENOUS CONTINUOUS
Status: ACTIVE | OUTPATIENT
Start: 2020-07-21 | End: 2020-07-21

## 2020-07-21 RX ORDER — HALOPERIDOL 5 MG/ML
1 INJECTION INTRAMUSCULAR
Status: DISCONTINUED | OUTPATIENT
Start: 2020-07-21 | End: 2020-07-21 | Stop reason: HOSPADM

## 2020-07-21 RX ORDER — LIDOCAINE AND PRILOCAINE 25; 25 MG/G; MG/G
CREAM TOPICAL ONCE
Status: COMPLETED | OUTPATIENT
Start: 2020-07-21 | End: 2020-07-21

## 2020-07-21 RX ADMIN — ONDANSETRON 4 MG: 2 INJECTION INTRAMUSCULAR; INTRAVENOUS at 19:04

## 2020-07-21 RX ADMIN — ALPRAZOLAM 0.5 MG: 0.25 TABLET ORAL at 09:24

## 2020-07-21 RX ADMIN — SODIUM CHLORIDE, POTASSIUM CHLORIDE, SODIUM LACTATE AND CALCIUM CHLORIDE: 600; 310; 30; 20 INJECTION, SOLUTION INTRAVENOUS at 17:46

## 2020-07-21 RX ADMIN — FENTANYL CITRATE 25 MCG: 50 INJECTION INTRAMUSCULAR; INTRAVENOUS at 19:50

## 2020-07-21 RX ADMIN — Medication 100 MCG: at 16:42

## 2020-07-21 RX ADMIN — Medication 100 MCG: at 16:49

## 2020-07-21 RX ADMIN — FENTANYL CITRATE 50 MCG: 50 INJECTION, SOLUTION INTRAMUSCULAR; INTRAVENOUS at 18:14

## 2020-07-21 RX ADMIN — FENTANYL CITRATE 25 MCG: 50 INJECTION, SOLUTION INTRAMUSCULAR; INTRAVENOUS at 16:39

## 2020-07-21 RX ADMIN — ACETAMINOPHEN 1000 MG: 500 TABLET ORAL at 19:55

## 2020-07-21 RX ADMIN — ACETAMINOPHEN 1000 MG: 500 TABLET ORAL at 10:01

## 2020-07-21 RX ADMIN — OXYCODONE HYDROCHLORIDE 5 MG: 5 SOLUTION ORAL at 19:51

## 2020-07-21 RX ADMIN — FENTANYL CITRATE 25 MCG: 50 INJECTION, SOLUTION INTRAMUSCULAR; INTRAVENOUS at 17:25

## 2020-07-21 RX ADMIN — PROPOFOL 110 MG: 10 INJECTION, EMULSION INTRAVENOUS at 16:29

## 2020-07-21 RX ADMIN — FENTANYL CITRATE 25 MCG: 50 INJECTION, SOLUTION INTRAMUSCULAR; INTRAVENOUS at 17:02

## 2020-07-21 RX ADMIN — FENTANYL CITRATE 25 MCG: 50 INJECTION, SOLUTION INTRAMUSCULAR; INTRAVENOUS at 16:49

## 2020-07-21 RX ADMIN — CEFAZOLIN 2 G: 330 INJECTION, POWDER, FOR SOLUTION INTRAMUSCULAR; INTRAVENOUS at 16:29

## 2020-07-21 RX ADMIN — FENTANYL CITRATE 50 MCG: 50 INJECTION, SOLUTION INTRAMUSCULAR; INTRAVENOUS at 19:15

## 2020-07-21 RX ADMIN — GABAPENTIN 300 MG: 300 CAPSULE ORAL at 10:01

## 2020-07-21 RX ADMIN — SODIUM CHLORIDE, POTASSIUM CHLORIDE, SODIUM LACTATE AND CALCIUM CHLORIDE: 600; 310; 30; 20 INJECTION, SOLUTION INTRAVENOUS at 19:21

## 2020-07-21 RX ADMIN — FENTANYL CITRATE 50 MCG: 50 INJECTION, SOLUTION INTRAMUSCULAR; INTRAVENOUS at 16:29

## 2020-07-21 RX ADMIN — FENTANYL CITRATE 50 MCG: 50 INJECTION, SOLUTION INTRAMUSCULAR; INTRAVENOUS at 17:46

## 2020-07-21 RX ADMIN — FENTANYL CITRATE 25 MCG: 50 INJECTION, SOLUTION INTRAMUSCULAR; INTRAVENOUS at 16:54

## 2020-07-21 RX ADMIN — LIDOCAINE AND PRILOCAINE 1 APPLICATORFUL: 25; 25 CREAM TOPICAL at 09:27

## 2020-07-21 RX ADMIN — DEXAMETHASONE SODIUM PHOSPHATE 8 MG: 4 INJECTION, SOLUTION INTRA-ARTICULAR; INTRALESIONAL; INTRAMUSCULAR; INTRAVENOUS; SOFT TISSUE at 16:33

## 2020-07-21 RX ADMIN — ONDANSETRON 4 MG: 2 INJECTION INTRAMUSCULAR; INTRAVENOUS at 19:51

## 2020-07-21 RX ADMIN — FENTANYL CITRATE 50 MCG: 50 INJECTION, SOLUTION INTRAMUSCULAR; INTRAVENOUS at 18:53

## 2020-07-21 RX ADMIN — FENTANYL CITRATE 25 MCG: 50 INJECTION INTRAMUSCULAR; INTRAVENOUS at 19:55

## 2020-07-21 RX ADMIN — LIDOCAINE HYDROCHLORIDE 5 ML: 20 INJECTION, SOLUTION EPIDURAL; INFILTRATION; INTRACAUDAL at 16:29

## 2020-07-21 RX ADMIN — POVIDONE-IODINE 15 ML: 10 SOLUTION TOPICAL at 09:24

## 2020-07-21 RX ADMIN — FENTANYL CITRATE 50 MCG: 50 INJECTION, SOLUTION INTRAMUSCULAR; INTRAVENOUS at 18:36

## 2020-07-21 RX ADMIN — LABETALOL HYDROCHLORIDE 10 MG: 5 INJECTION, SOLUTION INTRAVENOUS at 19:35

## 2020-07-21 RX ADMIN — SODIUM CHLORIDE, POTASSIUM CHLORIDE, SODIUM LACTATE AND CALCIUM CHLORIDE: 600; 310; 30; 20 INJECTION, SOLUTION INTRAVENOUS at 09:27

## 2020-07-21 RX ADMIN — MIDAZOLAM HYDROCHLORIDE 1 MG: 1 INJECTION, SOLUTION INTRAMUSCULAR; INTRAVENOUS at 16:25

## 2020-07-21 RX ADMIN — FENTANYL CITRATE 25 MCG: 50 INJECTION, SOLUTION INTRAMUSCULAR; INTRAVENOUS at 17:11

## 2020-07-21 RX ADMIN — FENTANYL CITRATE 50 MCG: 50 INJECTION, SOLUTION INTRAMUSCULAR; INTRAVENOUS at 18:02

## 2020-07-21 RX ADMIN — Medication 100 MCG: at 16:33

## 2020-07-21 ASSESSMENT — FIBROSIS 4 INDEX
FIB4 SCORE: 1.99
FIB4 SCORE: 1.99

## 2020-07-21 ASSESSMENT — PAIN SCALES - GENERAL: PAIN_LEVEL: 4

## 2020-07-21 NOTE — ANESTHESIA PROCEDURE NOTES
Airway    Date/Time: 7/21/2020 4:30 PM  Performed by: Any Bolton M.D.  Authorized by: Any Bolton M.D.     Location:  OR  Urgency:  Elective  Indications for Airway Management:  Anesthesia      Spontaneous Ventilation: absent    Sedation Level:  Deep  Preoxygenated: Yes    Mask Difficulty Assessment:  0 - not attempted  Final Airway Type:  Supraglottic airway  Final Supraglottic Airway:  Standard LMA    SGA Size:  4  Number of Attempts at Approach:  1

## 2020-07-21 NOTE — ANESTHESIA PREPROCEDURE EVALUATION
69 yo w/left breast cancer    Relevant Problems   ANESTHESIA (within normal limits)      PULMONARY   (+) Mild asthma      NEURO   (+) History of sinus tachycardia      CARDIAC   (+) Essential hypertension      Other   (+) Mixed hyperlipidemia     Denies CAD, CP/SOB, CVA, DM, LIVER/KIDNEY DZ, GERD, URI    Physical Exam    Airway   Mallampati: II  TM distance: >3 FB  Neck ROM: full       Cardiovascular   Rhythm: regular  Rate: normal  (-) murmur     Dental - normal exam           Pulmonary   Breath sounds clear to auscultation     Abdominal    Neurological - normal exam                 Anesthesia Plan    ASA 2       Plan - general       Airway plan will be LMA        Induction: intravenous    Postoperative Plan: Postoperative administration of opioids is intended.    Pertinent diagnostic labs and testing reviewed    Informed Consent:    Anesthetic plan and risks discussed with patient.

## 2020-07-22 LAB — PATHOLOGY CONSULT NOTE: NORMAL

## 2020-07-22 NOTE — ANESTHESIA POSTPROCEDURE EVALUATION
Patient: Fiona Daniels    Procedure Summary     Date:  07/21/20 Room / Location:  [facility] 08 / SURGERY [facility]    Anesthesia Start:  1619 Anesthesia Stop:  1945    Procedures:       MASTECTOMY, PARTIAL- WIRE LOC (Left Breast)      BIOPSY, LYMPH NODE, SENTINEL (Left Breast)      LYMPHADENECTOMY, AXILLARY- POSS (Left Axilla)      RECONSTRUCTION, BREAST-WITH LOCAL TISSUE REARRANGEMENT (Left Breast)      MAMMOPLASTY, REDUCTION-RIGHT BREAST FOR SYMMETRY (Right Breast) Diagnosis:  (LEFT BREAST CANCER)    Surgeon:  Shy Jean M.D.; Steven Schulz M.D. Responsible Provider:  Any Bolton M.D.    Anesthesia Type:  general ASA Status:  2          Final Anesthesia Type: general  Last vitals  BP   Blood Pressure : 150/73    Temp   36.4 °C (97.6 °F)    Pulse   Pulse: 86   Resp   18    SpO2   95 %      Anesthesia Post Evaluation    Patient location during evaluation: PACU  Patient participation: complete - patient participated  Level of consciousness: awake  Pain score: 4    Airway patency: patent  Anesthetic complications: no  Cardiovascular status: adequate  Respiratory status: acceptable  Hydration status: acceptable               Nurse Pain Score: 0 (NPRS)

## 2020-07-22 NOTE — ANESTHESIA TIME REPORT
Anesthesia Start and Stop Event Times     Date Time Event    7/21/2020 1557 Ready for Procedure     1619 Anesthesia Start     1945 Anesthesia Stop        Responsible Staff  07/21/20    Name Role Begin End    Any Bolton M.D. Anesth 1619 1945        Preop Diagnosis (Free Text):  Pre-op Diagnosis     LEFT BREAST CANCER        Preop Diagnosis (Codes):    Post op Diagnosis  History of left breast cancer      Premium Reason  A. 3PM - 7AM    Comments:

## 2020-07-22 NOTE — OR NURSING
Arrived to Phase II after report from Caitlyn in PACU    VSS, mild nausea and dizziness, transfered from gurney to chair with minimal assist.     Pain 9/10 to bilateral upper chest but calm, smiling. Educated pt on pain medication increasing her nausea and dizziness; pt states she does not need pain medication right now.     Dressing to chest with scant serosanguinous drainage to L side. KEHINDE drains x 2 with small sanguinous drainage.    Alarms on and set to appropriate parameters. Call light within reach, rounding in place.  at bedside.

## 2020-07-22 NOTE — OP REPORT
Pre op diagnosis:  Malignant neoplasm left breast  Post op diagnosis:  Same    Procedure:    1.  Wire localized partial mastectomy  2.  Wire localized excisional biopsy left breast lesion  3.  Left sentinel lymph node mapping with excision of deep axillary nodes.    Surgeon:  Shy Jean MD  Assistant:  Steven Schulz MD  Anesthesiologist:  Any Bolton MD    Anesthesia:  General   Pre op meds:  Ancef  ASA 2    Indications:  This is a 70 year old female with left breast CA and a second suspicious lesion.  After discussing risks and benefits of her options, she wishes to proceed with breast conserving surgery, axillary staging, excisional biopsy and reconstruction.    Findings:  1.  Both wire tips and the NETTE marker are present on specimen mammogram.  The radiologist who placed the wire did not complete the dictation sufficiently to guide the covering radiologist regarding adequacy of resection, but the area of interest appears to be excised.  The known tumor is 1-2 mm clear of the anterior margin which is the closest margin.  Additional anterior margin excised with suture marking new margin.  Additional tissue removed by Dr. Schulz also includes anterior margin.  2.  Total of four sentinel nodes, highest count 1200, background <10%.  2 assessed, benign.  All sent for permanent.    Summary:  The patient underwent radiotracer injection by Radiology and had pre op markings placed.  She was anesthetized, prepped, and draped in sterile fashion.  Time out was confirmed.  I created the incision in the superior and lateral aspect, and dissected to the area localize by both wires.  The breast tissue is very fatty so there were some areas where I had to reapproximate some tissue around the wire tip.  I placed orienting sutures and specimen mammogram was performed.  I irrigated, ensured hemostasis, and placed marking clips.      I then dissected through the clavipectoral fascia and used the gamma probe to locate the  sentinel nodes. These were quite deep in the axilla, but I was able to access them through the breast incision. I excised these using the Harmonic scalpel. There were no suspicious palpable nodes and background counts were less than 10%.  I irrigated and ensured hemostasis.  Dr. Schulz completed his portion of the procedure which is dictated separately.    CC: MD Nba Chandler, DO

## 2020-07-22 NOTE — DISCHARGE INSTRUCTIONS
ACTIVITY: Rest and take it easy for the first 24 hours.  A responsible adult is recommended to remain with you during that time.  It is normal to feel sleepy.  We encourage you to not do anything that requires balance, judgment or coordination.    MILD FLU-LIKE SYMPTOMS ARE NORMAL. YOU MAY EXPERIENCE GENERALIZED MUSCLE ACHES, THROAT IRRITATION, HEADACHE AND/OR SOME NAUSEA.    FOR 24 HOURS DO NOT:  Drive, operate machinery or run household appliances.  Drink beer or alcoholic beverages.   Make important decisions or sign legal documents.    SPECIAL INSTRUCTIONS:     See attached Jas Chen Drain Care Instructions and sheet for measuring drainage    No showers until drains removed.    Keep chest dressings clean, dry and intact until your follow up appintment    Ambulate (walk) regularly.      (Dr Jean left voicemail for  )    DIET: To avoid nausea, slowly advance diet as tolerated, avoiding spicy or greasy foods for the first day.  Add more substantial food to your diet according to your physician's instructions.   INCREASE FLUIDS AND FIBER TO AVOID CONSTIPATION.    SURGICAL DRESSING/BATHING: No showers until drains removed    FOLLOW-UP APPOINTMENT:  A follow-up appointment should be arranged with your doctor in 1-2 weeks or as scheduled; call to schedule.    You should CALL YOUR PHYSICIAN if you develop:  Fever greater than 101 degrees F.  Pain not relieved by medication, or persistent nausea or vomiting.  Excessive bleeding (blood soaking through dressing) or unexpected drainage from the wound.  Extreme redness or swelling around the incision site, drainage of pus or foul smelling drainage.  Inability to urinate or empty your bladder within 8 hours.  Problems with breathing or chest pain.    You should call 911 if you develop problems with breathing or chest pain.  If you are unable to contact your doctor or surgical center, you should go to the nearest emergency room or urgent care center.       Physician's telephone #:     [phone] Dr Jean    [phone] Dr Schulz    If any questions arise, call your doctor.  If your doctor is not available, please feel free to call the Surgical Center at [phone].  The Center is open Monday through Friday from 7AM to 7PM.  You can also call the HEALTH HOTLINE open 24 hours/day, 7 days/week and speak to a nurse at [phone], or toll free at [phone].    A registered nurse may call you a few days after your surgery to see how you are doing after your procedure.    MEDICATIONS: Resume taking daily medication.  Take prescribed pain medication with food.  If no medication is prescribed, you may take non-aspirin pain medication if needed.  PAIN MEDICATION CAN BE VERY CONSTIPATING.  Take a stool softener or laxative such as senokot, pericolace, or milk of magnesia if needed.    Prescription given -on chart      Last pain medication given - Oxycodone 5mg at 7:51pm, Tylenol 1000mg at 7:55pm and Zofran 4mg for nausea at 7:51pm    If your physician has prescribed pain medication that includes Acetaminophen (Tylenol), do not take additional Acetaminophen (Tylenol) while taking the prescribed medication.    Depression / Suicide Risk    As you are discharged from this [facility] facility, it is important to learn how to keep safe from harming yourself.    Recognize the warning signs:  · Abrupt changes in personality, positive or negative- including increase in energy   · Giving away possessions  · Change in eating patterns- significant weight changes-  positive or negative  · Change in sleeping patterns- unable to sleep or sleeping all the time   · Unwillingness or inability to communicate  · Depression  · Unusual sadness, discouragement and loneliness  · Talk of wanting to die  · Neglect of personal appearance   · Rebelliousness- reckless behavior  · Withdrawal from people/activities they love  · Confusion- inability to concentrate     If you or a loved  one observes any of these behaviors or has concerns about self-harm, here's what you can do:  · Talk about it- your feelings and reasons for harming yourself  · Remove any means that you might use to hurt yourself (examples: pills, rope, extension cords, firearm)  · Get professional help from the community (Mental Health, Substance Abuse, psychological counseling)  · Do not be alone:Call your Safe Contact- someone whom you trust who will be there for you.  · Call your local CRISIS HOTLINE 831-6781 or 361-645-8203  · Call your local Children's Mobile Crisis Response Team [place] (984) 173-5698 or www.[employer]  · Call the toll free National Suicide Prevention Hotlines   · National Suicide Prevention Lifeline 805-285-XGQM (6871)  · National Hope Line Network 800-SUICIDE (820-8869)

## 2020-07-22 NOTE — OR NURSING
VSS on RA after ambulation. IS provided for pt to take home. Reports dizziness resolved, nausea improved after emesis x 2. Ambulated around unit with steady gait.    Discharge instructions and KEHINDE care reviewed with pt and . Pt given prescription for zofran and norco. IV removed, tip intact.     Pt discharged via wheelchair with all personal belongings after all questions were answered.

## 2020-07-31 ENCOUNTER — PATIENT MESSAGE (OUTPATIENT)
Dept: HEALTH INFORMATION MANAGEMENT | Facility: OTHER | Age: 70
End: 2020-07-31

## 2020-08-05 PROBLEM — Z17.0 CARCINOMA OF UPPER-OUTER QUADRANT OF LEFT BREAST IN FEMALE, ESTROGEN RECEPTOR POSITIVE (HCC): Status: ACTIVE | Noted: 2020-08-05

## 2020-08-05 PROBLEM — C50.412 CARCINOMA OF UPPER-OUTER QUADRANT OF LEFT BREAST IN FEMALE, ESTROGEN RECEPTOR POSITIVE (HCC): Status: ACTIVE | Noted: 2020-08-05

## 2020-08-06 ENCOUNTER — HOSPITAL ENCOUNTER (OUTPATIENT)
Dept: RADIATION ONCOLOGY | Facility: MEDICAL CENTER | Age: 70
End: 2020-08-31
Attending: RADIOLOGY
Payer: MEDICARE

## 2020-08-06 VITALS
SYSTOLIC BLOOD PRESSURE: 134 MMHG | DIASTOLIC BLOOD PRESSURE: 74 MMHG | OXYGEN SATURATION: 93 % | HEART RATE: 82 BPM | TEMPERATURE: 97.8 F

## 2020-08-06 DIAGNOSIS — Z17.0 CARCINOMA OF UPPER-OUTER QUADRANT OF LEFT BREAST IN FEMALE, ESTROGEN RECEPTOR POSITIVE (HCC): ICD-10-CM

## 2020-08-06 DIAGNOSIS — C50.412 CARCINOMA OF UPPER-OUTER QUADRANT OF LEFT BREAST IN FEMALE, ESTROGEN RECEPTOR POSITIVE (HCC): ICD-10-CM

## 2020-08-06 PROCEDURE — 99214 OFFICE O/P EST MOD 30 MIN: CPT | Performed by: RADIOLOGY

## 2020-08-06 PROCEDURE — 99205 OFFICE O/P NEW HI 60 MIN: CPT | Performed by: RADIOLOGY

## 2020-08-06 RX ORDER — ANASTROZOLE 1 MG/1
1 TABLET ORAL DAILY
COMMUNITY
Start: 2020-07-30

## 2020-08-06 ASSESSMENT — PAIN SCALES - GENERAL: PAINLEVEL: NO PAIN

## 2020-08-06 NOTE — CONSULTS
"RADIATION ONCOLOGY CONSULT    DATE OF SERVICE: 8/6/2020    IDENTIFICATION:  Stage IA ([id number]) G1, 2 foci, invasive ductal carcinoma of the left upper outer quadrant of breast ER NV positive HER-2/luis negative with a Ki-67 of 5% status post lumpectomy and sentinel lymph node biopsy with contralateral reduction mammoplasty on 7/21/2020 planning on anastrozole    HISTORY OF PRESENT ILLNESS: I had the pleasure of seeing Ms. Daniels today in consultation at the request of Dr. Jean for her breast cancer.  Patient had a mammogram that showed asymmetry in the left upper outer quadrant of her breast.  Diagnostic mammogram and ultrasound showed a roughly 10.8 mm hypoechoic lesion without any associated lymphadenopathy.  Patient had a biopsy that showed 10 mm of invasive ductal carcinoma with grade 1 features.  The tumor was ER NV positive HER-2/luis negative with a Ki-67 of less than 5%.  Patient elected to pursue breast conservation management.  In addition she had a right-sided reduction mammoplasty.  This showed a 1 cm invasive ductal carcinoma with an associated second nearby lesion of 6 mm of invasive ductal carcinoma.  We shared similar features.  Margins were negative.  5 sentinel lymph nodes were negative for malignancy.  She is planning on starting an aromatase inhibitor after completion of radiation.  She presents to me today to further discuss the role of radiation and breast conservation management.    PAST MEDICAL HISTORY:   Past Medical History:   Diagnosis Date   • Anesthesia     mother  \"dead for a minute\"   • Arthritis 07/16/2020    hx Gout   • ASTHMA    • Carcinoma of upper-outer quadrant of left breast in female, estrogen receptor positive (HCC) 8/5/2020   • Hepatitis A     age 6   • Hypertension    • Pain 07/16/2020    back pain       PAST SURGICAL HISTORY:  Past Surgical History:   Procedure Laterality Date   • PB REDUCTION OF LARGE BREAST Right 7/21/2020    Procedure: MAMMOPLASTY, REDUCTION-RIGHT BREAST " FOR SYMMETRY;  Surgeon: Steven Schulz M.D.;  Location: SURGERY [facility];  Service: General   • PARTIAL MASTECTOMY Left 2020    Procedure: MASTECTOMY, PARTIAL- WIRE LOC;  Surgeon: Shy Jean M.D.;  Location: SURGERY [facility];  Service: General   • NODE BIOPSY SENTINEL Left 2020    Procedure: BIOPSY, LYMPH NODE, SENTINEL;  Surgeon: Shy Jean M.D.;  Location: SURGERY [facility];  Service: General   • AXILLARY NODE DISSECTION Left 2020    Procedure: LYMPHADENECTOMY, AXILLARY- POSS;  Surgeon: Shy Jean M.D.;  Location: SURGERY [facility];  Service: General   • BREAST RECONSTRUCTION Left 2020    Procedure: RECONSTRUCTION, BREAST-WITH LOCAL TISSUE REARRANGEMENT;  Surgeon: Steven Schulz M.D.;  Location: SURGERY [facility];  Service: General   • OTHER  2014    sinus   • ETHMOIDECTOMY  2012    Performed by GÓMEZ VALDERRAMA at SURGERY SAME DAY [facility] ORS   • NASAL POLYPECTOMY  2012    Performed by GÓMEZ VALDERRAMA at SURGERY SAME DAY [facility] ORS   • NASAL SEPTAL RECONSTRUCTION  2012    Performed by GÓMEZ VALDERRAMA at SURGERY SAME DAY [facility] ORS   • OTHER ORTHOPEDIC SURGERY      ORIF right wrist       GYNECOLOGICAL STATUS:  : 0 and Para: 0    HORMONE USE:  Contraceptive hormone use 2 years hormone replacement therapy for 7 year    CURRENT MEDICATIONS:  Current Outpatient Medications   Medication Sig Dispense Refill   • anastrozole (ARIMIDEX) 1 MG Tab Take  by mouth every day. Take 1 tablet by mouth once daily     • metoprolol SR (TOPROL XL) 25 MG TABLET SR 24 HR Take 1 Tab by mouth every day. 90 Tab 2   • atorvastatin (LIPITOR) 20 MG Tab      • trandolapril (MAVIK) 4 MG Tab TK 2 T PO QD  0   • hydrochlorothiazide (HYDRODIURIL) 12.5 MG tablet Take 12.5 mg by mouth every day.     • albuterol (VENTOLIN OR PROVENTIL) 108 (90 BASE) MCG/ACT AERS Inhale 2 Puffs by mouth every four hours as needed for Shortness of Breath. 1 Inhaler 3   •  fluticasone-salmeterol (ADVAIR DISKUS) 100-50 MCG/DOSE AEPB Inhale 1 Puff by mouth every day.     • Multiple Vitamins-Minerals (ONE-A-DAY 50 PLUS PO) Take  by mouth every day.     • Cholecalciferol (VITAMIN D3 PO) Take  by mouth every day.     • CALCIUM PO Take  by mouth every day. Pt does not know dose       No current facility-administered medications for this encounter.        ALLERGIES:    Aspirin, Codeine, Moxifloxacin hcl in nacl, and Robitussin dm [guiatuss dm]    FAMILY HISTORY:    Family History   Problem Relation Age of Onset   • Cancer Mother         Colon       SOCIAL HISTORY:    Social History     Tobacco Use   • Smoking status: Never Smoker   • Smokeless tobacco: Never Used   Substance Use Topics   • Alcohol use: No   • Drug use: No     Patient is retired from [employer] with:  in [place]    REVIEW OF SYSTEMS:  A complete review of systems was completed in patient's chart on 8/6/2020.  All are negative with relationship to this diagnosis with the exception of:  Patient feels she is healing well from her surgery, she denies any new areas of concern in the breast or axilla, denies any new musculoskeletal complaints, denies any headache or neurologic symptoms    PHYSICAL EXAM:    Vitals:    08/06/20 0829   BP: 134/74   Pulse: 82   Temp: 36.6 °C (97.8 °F)   SpO2: 93%   Pain Score: No pain      0= Fully active, able to carry on all pre-disease performance without restriction.    GENERAL: No apparent distress.  HEENT:  Pupils are equal, round, and reactive to light.  Extraocular muscles   are intact. Sclerae nonicteric.  Conjunctivae pink.  Oral cavity, tongue   protrudes midline.   NECK:  Supple without evidence of thyromegaly.  NODES:  No peripheral adenopathy of the neck, supraclavicular fossa or axillae   bilaterally.  LUNGS:  Clear to ascultation bilaterally   HEART:  Regular rate and rhythm.  No murmur appreciated  BREAST: No suspicious lesions found in either breast or  axilla.  ABDOMEN:  Soft. No evidence of hepatosplenomegaly.  Positive bowel sounds.  EXTREMITIES:  Without Edema.  NEUROLOGIC:  Cranial nerves II through XII were intact. Normal stance and gait motor and sensory grossly within normal limits        IMPRESSION:    Stage IA ([id number]) G1, 2 foci, invasive ductal carcinoma of the left upper outer quadrant of breast ER NH positive HER-2/luis negative with a Ki-67 of 5% status post lumpectomy and sentinel lymph node biopsy with contralateral reduction mammoplasty on 7/21/2020 planning on anastrozole      RECOMMENDATIONS:   I discussed the diagnosis, prognosis, and treatment options over a 1 hr  time period, 95% of that time dedicated to ongoing treatment management.  We discussed the early stage favorable biology of her tumor consistent with luminal type A.  Next we discussed the data surrounding mastectomy versus lumpectomy and radiation.  We went over lumpectomy with and without radiation.  Finally we went over a favorable subgroup data and the omission of radiation.  Patient does wish to pursue adjuvant radiotherapy.  I anticipate hypofractionated radiation without a boost.  She was given a simulation for August 17 at 11 AM.    We discussed the risks, benefits and side effects of treatment and the patient is amenable to treatment.  If patient has any questions or concerns, she should feel free to contact me.    Thank you for the opportunity to participate in her care.  If any questions or comments, please do not hesitate in calling.

## 2020-08-16 NOTE — PROGRESS NOTES
"Non face to face prolonged services of clinical data and chart information reviewed for a total time of 30 performed on 8/14, 8/15 and 8/16 for Fiona Fernandez Jeremiah  Reviewed were:    Referral    Previous encounters    Imaging all mammography, colonoscopy, CT/tomography, MRI and PET studies    Previous procedures all surgical and biopsy procedures including pathology analysis and interpretation     Notes    Media all personal and family clinical data from outside institutions including germline molecular DNA studies    Miscellaneous reports    Patient summaries family history as documented by referring clinician  Counseling, testing information and materials prepared  \"I spent 30 minutes  from 0600 to 0630 reviewing past records, prior to visit pertaining to genetic risk.\"   Len Maher M.D.  edited  "

## 2020-08-17 ENCOUNTER — HOSPITAL ENCOUNTER (OUTPATIENT)
Dept: RADIATION ONCOLOGY | Facility: MEDICAL CENTER | Age: 70
End: 2020-08-17
Payer: MEDICARE

## 2020-08-17 ENCOUNTER — OFFICE VISIT (OUTPATIENT)
Dept: HEMATOLOGY ONCOLOGY | Facility: MEDICAL CENTER | Age: 70
End: 2020-08-17
Payer: MEDICARE

## 2020-08-17 VITALS
SYSTOLIC BLOOD PRESSURE: 122 MMHG | HEART RATE: 82 BPM | DIASTOLIC BLOOD PRESSURE: 68 MMHG | BODY MASS INDEX: 32.24 KG/M2 | TEMPERATURE: 98 F | OXYGEN SATURATION: 94 % | HEIGHT: 66 IN | RESPIRATION RATE: 18 BRPM | WEIGHT: 200.62 LBS

## 2020-08-17 DIAGNOSIS — Z80.9 FAMILY HISTORY OF CANCER: ICD-10-CM

## 2020-08-17 PROCEDURE — 99212 OFFICE O/P EST SF 10 MIN: CPT | Performed by: MEDICAL GENETICS

## 2020-08-17 PROCEDURE — 77290 THER RAD SIMULAJ FIELD CPLX: CPT | Mod: 26 | Performed by: RADIOLOGY

## 2020-08-17 PROCEDURE — 77263 THER RADIOLOGY TX PLNG CPLX: CPT | Performed by: RADIOLOGY

## 2020-08-17 PROCEDURE — 77334 RADIATION TREATMENT AID(S): CPT | Performed by: RADIOLOGY

## 2020-08-17 PROCEDURE — 77334 RADIATION TREATMENT AID(S): CPT | Mod: 26 | Performed by: RADIOLOGY

## 2020-08-17 PROCEDURE — 77290 THER RAD SIMULAJ FIELD CPLX: CPT | Performed by: RADIOLOGY

## 2020-08-17 ASSESSMENT — FIBROSIS 4 INDEX: FIB4 SCORE: 1.99

## 2020-08-17 NOTE — PROGRESS NOTES
Patient originally referred for cancer genetic risk assessment, counseling and testing  The patient and I discussed prior familial implications and genetic discrimination   The patient elected to proceed with testing    The patient presents today for discussion of results    Results:    [employer]  Cancer[employer] multigene panel (34 genes):  No clinical significant variants found    All questions answered

## 2020-08-20 PROCEDURE — 77295 3-D RADIOTHERAPY PLAN: CPT | Mod: 26 | Performed by: RADIOLOGY

## 2020-08-20 PROCEDURE — 77334 RADIATION TREATMENT AID(S): CPT | Performed by: RADIOLOGY

## 2020-08-20 PROCEDURE — 77334 RADIATION TREATMENT AID(S): CPT | Mod: 26 | Performed by: RADIOLOGY

## 2020-08-20 PROCEDURE — 77300 RADIATION THERAPY DOSE PLAN: CPT | Mod: 26 | Performed by: RADIOLOGY

## 2020-08-20 PROCEDURE — 77295 3-D RADIOTHERAPY PLAN: CPT | Performed by: RADIOLOGY

## 2020-08-20 PROCEDURE — 77300 RADIATION THERAPY DOSE PLAN: CPT | Performed by: RADIOLOGY

## 2020-08-24 ENCOUNTER — HOSPITAL ENCOUNTER (OUTPATIENT)
Dept: RADIATION ONCOLOGY | Facility: MEDICAL CENTER | Age: 70
End: 2020-08-24
Payer: MEDICARE

## 2020-08-24 LAB
CHEMOTHERAPY INFUSION START DATE: NORMAL
CHEMOTHERAPY RECORDS: 2.67
CHEMOTHERAPY RECORDS: 4005
CHEMOTHERAPY RECORDS: NORMAL
CHEMOTHERAPY RX CANCER: NORMAL
DATE 1ST CHEMO CANCER: NORMAL
RAD ONC ARIA COURSE LAST TREATMENT DATE: NORMAL
RAD ONC ARIA COURSE TREATMENT ELAPSED DAYS: NORMAL
RAD ONC ARIA REFERENCE POINT DOSAGE GIVEN TO DATE: 2.67
RAD ONC ARIA REFERENCE POINT DOSAGE GIVEN TO DATE: 2.67
RAD ONC ARIA REFERENCE POINT ID: NORMAL
RAD ONC ARIA REFERENCE POINT ID: NORMAL
RAD ONC ARIA REFERENCE POINT SESSION DOSAGE GIVEN: 2.67
RAD ONC ARIA REFERENCE POINT SESSION DOSAGE GIVEN: 2.67

## 2020-08-24 PROCEDURE — 77280 THER RAD SIMULAJ FIELD SMPL: CPT | Performed by: RADIOLOGY

## 2020-08-24 PROCEDURE — 77280 THER RAD SIMULAJ FIELD SMPL: CPT | Mod: 26 | Performed by: RADIOLOGY

## 2020-08-24 PROCEDURE — 77412 RADIATION TX DELIVERY LVL 3: CPT | Performed by: RADIOLOGY

## 2020-08-25 ENCOUNTER — HOSPITAL ENCOUNTER (OUTPATIENT)
Dept: RADIATION ONCOLOGY | Facility: MEDICAL CENTER | Age: 70
End: 2020-08-25
Payer: MEDICARE

## 2020-08-25 LAB
CHEMOTHERAPY INFUSION START DATE: NORMAL
CHEMOTHERAPY RECORDS: 2.67
CHEMOTHERAPY RECORDS: 4005
CHEMOTHERAPY RECORDS: NORMAL
CHEMOTHERAPY RX CANCER: NORMAL
DATE 1ST CHEMO CANCER: NORMAL
RAD ONC ARIA COURSE LAST TREATMENT DATE: NORMAL
RAD ONC ARIA COURSE TREATMENT ELAPSED DAYS: NORMAL
RAD ONC ARIA REFERENCE POINT DOSAGE GIVEN TO DATE: 5.34
RAD ONC ARIA REFERENCE POINT DOSAGE GIVEN TO DATE: 5.34
RAD ONC ARIA REFERENCE POINT ID: NORMAL
RAD ONC ARIA REFERENCE POINT ID: NORMAL
RAD ONC ARIA REFERENCE POINT SESSION DOSAGE GIVEN: 2.67
RAD ONC ARIA REFERENCE POINT SESSION DOSAGE GIVEN: 2.67

## 2020-08-25 PROCEDURE — 77412 RADIATION TX DELIVERY LVL 3: CPT | Performed by: RADIOLOGY

## 2020-08-26 ENCOUNTER — HOSPITAL ENCOUNTER (OUTPATIENT)
Dept: RADIATION ONCOLOGY | Facility: MEDICAL CENTER | Age: 70
End: 2020-08-26
Payer: MEDICARE

## 2020-08-26 LAB
CHEMOTHERAPY INFUSION START DATE: NORMAL
CHEMOTHERAPY RECORDS: 2.67
CHEMOTHERAPY RECORDS: 4005
CHEMOTHERAPY RECORDS: NORMAL
CHEMOTHERAPY RX CANCER: NORMAL
DATE 1ST CHEMO CANCER: NORMAL
RAD ONC ARIA COURSE LAST TREATMENT DATE: NORMAL
RAD ONC ARIA COURSE TREATMENT ELAPSED DAYS: NORMAL
RAD ONC ARIA REFERENCE POINT DOSAGE GIVEN TO DATE: 8.01
RAD ONC ARIA REFERENCE POINT DOSAGE GIVEN TO DATE: 8.01
RAD ONC ARIA REFERENCE POINT ID: NORMAL
RAD ONC ARIA REFERENCE POINT ID: NORMAL
RAD ONC ARIA REFERENCE POINT SESSION DOSAGE GIVEN: 2.67
RAD ONC ARIA REFERENCE POINT SESSION DOSAGE GIVEN: 2.67

## 2020-08-26 PROCEDURE — 77336 RADIATION PHYSICS CONSULT: CPT | Performed by: RADIOLOGY

## 2020-08-26 PROCEDURE — 77412 RADIATION TX DELIVERY LVL 3: CPT | Performed by: RADIOLOGY

## 2020-08-26 NOTE — ON TREATMENT VISIT
ON TREATMENT NOTE  RADIATION ONCOLOGY DEPARTMENT    Patient name:  Fiona Daniels    Primary Physician:  Nba Rodríguez D.O. MRN: [id number]  [facility]: [id number]   Referring physician:  Shy Jean M.D. : 1950, 70 y.o.     ENCOUNTER DATE:  20    DIAGNOSIS:    Carcinoma of upper-outer quadrant of left breast in female, estrogen receptor positive (HCC)  Staging form: Breast, AJCC 8th Edition  - Pathologic: Stage IA (pT1b, pN0, cM0, G1, ER+, CA+, HER2-) - Signed by Janes See M.D. on 2020  Histologic grading system: 3 grade system      TREATMENT SUMMARY:  Aria Treatment Information        Some values may be hidden. Unless noted otherwise, only the newest values recorded on each date are displayed.         Aria Treatment Summary 20   Course First Treatment Date 2020   Course Last Treatment Date 2020   L_Breast Plan from Course C1_LBreast   Fraction 3 of 15   Elapsed Course Days 2 @    Prescribed Fraction Dose 267 cGy   Prescribed Total Dose 4,005 cGy   L Breast DPV Reference Point from Course C1_LBreast   Elapsed Course Days 2 @    Session Dose 267 cGy   Total Dose 801 cGy   L Breast cp Reference Point from Course C1_LBreast   Elapsed Course Days 2 @    Session Dose 267 cGy   Total Dose 801 cGy              SUBJECTIVE:   Patient is doing well.  She does not have any signs or symptoms she would attribute to her radiation.    VITAL SIGNS:       Pain Assessment 2020   Pain Score 0   Some recent data might be hidden          PHYSICAL EXAM:  No erythema    TOXICITY  No flowsheet data found.      IMPRESSION:  Cancer Staging  Carcinoma of upper-outer quadrant of left breast in female, estrogen receptor positive (HCC)  Staging form: Breast, AJCC 8th Edition  - Clinical: No stage assigned - Unsigned  - Pathologic: Stage IA (pT1b, pN0, cM0, G1, ER+, CA+, HER2-) - Signed by Janes See M.D. on 2020      PLAN:  No change in  treatment plan    Disposition:  Treatment plan reviewed. Questions answered. Continue therapy outlined.     Janes See M.D.    No orders of the defined types were placed in this encounter.

## 2020-08-27 ENCOUNTER — HOSPITAL ENCOUNTER (OUTPATIENT)
Dept: RADIATION ONCOLOGY | Facility: MEDICAL CENTER | Age: 70
End: 2020-08-27
Payer: MEDICARE

## 2020-08-27 LAB
CHEMOTHERAPY INFUSION START DATE: NORMAL
CHEMOTHERAPY RECORDS: 2.67
CHEMOTHERAPY RECORDS: 4005
CHEMOTHERAPY RECORDS: NORMAL
CHEMOTHERAPY RX CANCER: NORMAL
DATE 1ST CHEMO CANCER: NORMAL
RAD ONC ARIA COURSE LAST TREATMENT DATE: NORMAL
RAD ONC ARIA COURSE TREATMENT ELAPSED DAYS: NORMAL
RAD ONC ARIA REFERENCE POINT DOSAGE GIVEN TO DATE: 10.68
RAD ONC ARIA REFERENCE POINT DOSAGE GIVEN TO DATE: 10.68
RAD ONC ARIA REFERENCE POINT ID: NORMAL
RAD ONC ARIA REFERENCE POINT ID: NORMAL
RAD ONC ARIA REFERENCE POINT SESSION DOSAGE GIVEN: 2.67
RAD ONC ARIA REFERENCE POINT SESSION DOSAGE GIVEN: 2.67

## 2020-08-27 PROCEDURE — 77412 RADIATION TX DELIVERY LVL 3: CPT | Performed by: RADIOLOGY

## 2020-08-28 ENCOUNTER — HOSPITAL ENCOUNTER (OUTPATIENT)
Dept: RADIATION ONCOLOGY | Facility: MEDICAL CENTER | Age: 70
End: 2020-08-28
Payer: MEDICARE

## 2020-08-28 LAB
CHEMOTHERAPY INFUSION START DATE: NORMAL
CHEMOTHERAPY RECORDS: 2.67
CHEMOTHERAPY RECORDS: 4005
CHEMOTHERAPY RECORDS: NORMAL
CHEMOTHERAPY RX CANCER: NORMAL
DATE 1ST CHEMO CANCER: NORMAL
RAD ONC ARIA COURSE LAST TREATMENT DATE: NORMAL
RAD ONC ARIA COURSE TREATMENT ELAPSED DAYS: NORMAL
RAD ONC ARIA REFERENCE POINT DOSAGE GIVEN TO DATE: 13.35
RAD ONC ARIA REFERENCE POINT DOSAGE GIVEN TO DATE: 13.35
RAD ONC ARIA REFERENCE POINT ID: NORMAL
RAD ONC ARIA REFERENCE POINT ID: NORMAL
RAD ONC ARIA REFERENCE POINT SESSION DOSAGE GIVEN: 2.67
RAD ONC ARIA REFERENCE POINT SESSION DOSAGE GIVEN: 2.67

## 2020-08-28 PROCEDURE — 77427 RADIATION TX MANAGEMENT X5: CPT | Performed by: RADIOLOGY

## 2020-08-28 PROCEDURE — 77412 RADIATION TX DELIVERY LVL 3: CPT | Performed by: RADIOLOGY

## 2020-08-31 ENCOUNTER — HOSPITAL ENCOUNTER (OUTPATIENT)
Dept: RADIATION ONCOLOGY | Facility: MEDICAL CENTER | Age: 70
End: 2020-08-31
Payer: MEDICARE

## 2020-08-31 LAB
CHEMOTHERAPY INFUSION START DATE: NORMAL
CHEMOTHERAPY RECORDS: 2.67
CHEMOTHERAPY RECORDS: 4005
CHEMOTHERAPY RECORDS: NORMAL
CHEMOTHERAPY RX CANCER: NORMAL
DATE 1ST CHEMO CANCER: NORMAL
RAD ONC ARIA COURSE LAST TREATMENT DATE: NORMAL
RAD ONC ARIA COURSE TREATMENT ELAPSED DAYS: NORMAL
RAD ONC ARIA REFERENCE POINT DOSAGE GIVEN TO DATE: 16.02
RAD ONC ARIA REFERENCE POINT DOSAGE GIVEN TO DATE: 16.02
RAD ONC ARIA REFERENCE POINT ID: NORMAL
RAD ONC ARIA REFERENCE POINT ID: NORMAL
RAD ONC ARIA REFERENCE POINT SESSION DOSAGE GIVEN: 2.67
RAD ONC ARIA REFERENCE POINT SESSION DOSAGE GIVEN: 2.67

## 2020-08-31 PROCEDURE — 77417 THER RADIOLOGY PORT IMAGE(S): CPT | Performed by: RADIOLOGY

## 2020-08-31 PROCEDURE — 77412 RADIATION TX DELIVERY LVL 3: CPT | Performed by: RADIOLOGY

## 2020-09-01 ENCOUNTER — HOSPITAL ENCOUNTER (OUTPATIENT)
Dept: RADIATION ONCOLOGY | Facility: MEDICAL CENTER | Age: 70
End: 2020-09-30
Attending: RADIOLOGY
Payer: MEDICARE

## 2020-09-01 ENCOUNTER — APPOINTMENT (RX ONLY)
Dept: URBAN - METROPOLITAN AREA CLINIC 4 | Facility: CLINIC | Age: 70
Setting detail: DERMATOLOGY
End: 2020-09-01

## 2020-09-01 ENCOUNTER — HOSPITAL ENCOUNTER (OUTPATIENT)
Dept: RADIATION ONCOLOGY | Facility: MEDICAL CENTER | Age: 70
End: 2020-09-01
Payer: MEDICARE

## 2020-09-01 DIAGNOSIS — D485 NEOPLASM OF UNCERTAIN BEHAVIOR OF SKIN: ICD-10-CM

## 2020-09-01 DIAGNOSIS — L72.8 OTHER FOLLICULAR CYSTS OF THE SKIN AND SUBCUTANEOUS TISSUE: ICD-10-CM

## 2020-09-01 DIAGNOSIS — L82.1 OTHER SEBORRHEIC KERATOSIS: ICD-10-CM

## 2020-09-01 DIAGNOSIS — L82.0 INFLAMED SEBORRHEIC KERATOSIS: ICD-10-CM

## 2020-09-01 DIAGNOSIS — L81.4 OTHER MELANIN HYPERPIGMENTATION: ICD-10-CM

## 2020-09-01 PROBLEM — D48.5 NEOPLASM OF UNCERTAIN BEHAVIOR OF SKIN: Status: ACTIVE | Noted: 2020-09-01

## 2020-09-01 LAB
CHEMOTHERAPY INFUSION START DATE: NORMAL
CHEMOTHERAPY RECORDS: 2.67
CHEMOTHERAPY RECORDS: 4005
CHEMOTHERAPY RECORDS: NORMAL
CHEMOTHERAPY RX CANCER: NORMAL
DATE 1ST CHEMO CANCER: NORMAL
RAD ONC ARIA COURSE LAST TREATMENT DATE: NORMAL
RAD ONC ARIA COURSE TREATMENT ELAPSED DAYS: NORMAL
RAD ONC ARIA REFERENCE POINT DOSAGE GIVEN TO DATE: 18.69
RAD ONC ARIA REFERENCE POINT DOSAGE GIVEN TO DATE: 18.69
RAD ONC ARIA REFERENCE POINT ID: NORMAL
RAD ONC ARIA REFERENCE POINT ID: NORMAL
RAD ONC ARIA REFERENCE POINT SESSION DOSAGE GIVEN: 2.67
RAD ONC ARIA REFERENCE POINT SESSION DOSAGE GIVEN: 2.67

## 2020-09-01 PROCEDURE — 99213 OFFICE O/P EST LOW 20 MIN: CPT | Mod: 25

## 2020-09-01 PROCEDURE — 17110 DESTRUCTION B9 LES UP TO 14: CPT

## 2020-09-01 PROCEDURE — ? COUNSELING

## 2020-09-01 PROCEDURE — ? DEFER

## 2020-09-01 PROCEDURE — ? LIQUID NITROGEN

## 2020-09-01 PROCEDURE — 77412 RADIATION TX DELIVERY LVL 3: CPT | Performed by: RADIOLOGY

## 2020-09-01 ASSESSMENT — LOCATION DETAILED DESCRIPTION DERM
LOCATION DETAILED: RIGHT DISTAL POSTERIOR THIGH
LOCATION DETAILED: LEFT INFERIOR CENTRAL MALAR CHEEK
LOCATION DETAILED: SUPERIOR THORACIC SPINE
LOCATION DETAILED: LEFT SUPERIOR MEDIAL UPPER BACK
LOCATION DETAILED: MIDDLE STERNUM
LOCATION DETAILED: LOWER STERNUM

## 2020-09-01 ASSESSMENT — LOCATION SIMPLE DESCRIPTION DERM
LOCATION SIMPLE: UPPER BACK
LOCATION SIMPLE: RIGHT POSTERIOR THIGH
LOCATION SIMPLE: LEFT UPPER BACK
LOCATION SIMPLE: LEFT CHEEK
LOCATION SIMPLE: CHEST

## 2020-09-01 ASSESSMENT — LOCATION ZONE DERM
LOCATION ZONE: TRUNK
LOCATION ZONE: FACE
LOCATION ZONE: LEG

## 2020-09-01 NOTE — PROCEDURE: LIQUID NITROGEN
Post-Care Instructions: I reviewed with the patient in detail post-care instructions. Patient is to wear sunprotection, and avoid picking at any of the treated lesions. Pt may apply Vaseline to crusted or scabbing areas.
Add 52 Modifier (Optional): no
Detail Level: Detailed
Consent: The patient's consent was obtained including but not limited to risks of crusting, scabbing, blistering, scarring, darker or lighter pigmentary change, recurrence, incomplete removal and infection.
Medical Necessity Information: It is in your best interest to select a reason for this procedure from the list below. All of these items fulfill various CMS LCD requirements except the new and changing color options.
Medical Necessity Clause: This procedure was medically necessary because the lesions that were treated were:

## 2020-09-01 NOTE — PROCEDURE: DEFER
Introduction Text (Please End With A Colon): defers bx today - strictly breast feeding infant, not willing to pump & dump; will plan to reschedule at later date when prepared
Detail Level: Detailed

## 2020-09-02 ENCOUNTER — HOSPITAL ENCOUNTER (OUTPATIENT)
Dept: RADIATION ONCOLOGY | Facility: MEDICAL CENTER | Age: 70
End: 2020-09-02
Payer: MEDICARE

## 2020-09-02 VITALS
HEART RATE: 84 BPM | OXYGEN SATURATION: 94 % | SYSTOLIC BLOOD PRESSURE: 135 MMHG | DIASTOLIC BLOOD PRESSURE: 77 MMHG | TEMPERATURE: 97.2 F

## 2020-09-02 LAB
CHEMOTHERAPY INFUSION START DATE: NORMAL
CHEMOTHERAPY RECORDS: 2.67
CHEMOTHERAPY RECORDS: 4005
CHEMOTHERAPY RECORDS: NORMAL
CHEMOTHERAPY RX CANCER: NORMAL
DATE 1ST CHEMO CANCER: NORMAL
RAD ONC ARIA COURSE LAST TREATMENT DATE: NORMAL
RAD ONC ARIA COURSE TREATMENT ELAPSED DAYS: NORMAL
RAD ONC ARIA REFERENCE POINT DOSAGE GIVEN TO DATE: 21.36
RAD ONC ARIA REFERENCE POINT DOSAGE GIVEN TO DATE: 21.36
RAD ONC ARIA REFERENCE POINT ID: NORMAL
RAD ONC ARIA REFERENCE POINT ID: NORMAL
RAD ONC ARIA REFERENCE POINT SESSION DOSAGE GIVEN: 2.67
RAD ONC ARIA REFERENCE POINT SESSION DOSAGE GIVEN: 2.67

## 2020-09-02 PROCEDURE — 77336 RADIATION PHYSICS CONSULT: CPT | Performed by: RADIOLOGY

## 2020-09-02 PROCEDURE — 77412 RADIATION TX DELIVERY LVL 3: CPT | Performed by: RADIOLOGY

## 2020-09-02 ASSESSMENT — PAIN SCALES - GENERAL: PAINLEVEL: NO PAIN

## 2020-09-02 NOTE — ON TREATMENT VISIT
ON TREATMENT NOTE  RADIATION ONCOLOGY DEPARTMENT    Patient name:  Fiona Daniels    Primary Physician:  Nba Rodríguez D.O. MRN: [id number]  CSN: [id number]   Referring physician:  Shy Jean M.D. : 1950, 70 y.o.     ENCOUNTER DATE:  20    DIAGNOSIS:    Carcinoma of upper-outer quadrant of left breast in female, estrogen receptor positive (HCC)  Staging form: Breast, AJCC 8th Edition  - Pathologic: Stage IA (pT1b, pN0, cM0, G1, ER+, NE+, HER2-) - Signed by Janes See M.D. on 2020  Histologic grading system: 3 grade system      TREATMENT SUMMARY:  Aria Treatment Information        Some values may be hidden. Unless noted otherwise, only the newest values recorded on each date are displayed.         Aria Treatment Summary 20   Course First Treatment Date 2020   Course Last Treatment Date 2020   L_Breast Plan from Course C1_LBreast   Fraction 8 of 15   Elapsed Course Days 9 @    Prescribed Fraction Dose 267 cGy   Prescribed Total Dose 4,005 cGy   L Breast DPV Reference Point from Course C1_LBreast   Elapsed Course Days  @ [id number]   Session Dose 267 cGy   Total Dose 2,136 cGy   L Breast cp Reference Point from Course C1_LBreast   Elapsed Course Days  @ [id number]   Session Dose 267 cGy   Total Dose 2,136 cGy              SUBJECTIVE:   Patient is doing well.  She does not have any changes she would attribute to her radiation.    VITAL SIGNS:    Encounter Vitals  Temperature: 36.2 °C (97.2 °F)  Blood Pressure : 135/77  Pulse: 84  Pulse Oximetry: 94 %  Pain Score: No pain  Pain Assessment 2020   Pain Score 0 0   Some recent data might be hidden          PHYSICAL EXAM:  No erythema    TOXICITY  Toxicity Assessment 2020   Toxicity Assessment Breast   Fatigue (lethargy, malaise, asthenia) Increased fatigue over baseline, but not altering normal activities   Fever (in the absence of neutropenia) None   Radiation Dermatitis Faint  erythema or dry desquamation   Lymphatics Normal   RT - Pain due to RT None   Dyspnea Normal         IMPRESSION:  Cancer Staging  Carcinoma of upper-outer quadrant of left breast in female, estrogen receptor positive (HCC)  Staging form: Breast, AJCC 8th Edition  - Clinical: No stage assigned - Unsigned  - Pathologic: Stage IA (pT1b, pN0, cM0, G1, ER+, AK+, HER2-) - Signed by Janes See M.D. on 8/5/2020      PLAN:  No change in treatment plan    Disposition:  Treatment plan reviewed. Questions answered. Continue therapy outlined.     Janes See M.D.    No orders of the defined types were placed in this encounter.

## 2020-09-02 NOTE — OP REPORT
DATE OF SERVICE:  07/21/2020    PREOPERATIVE DIAGNOSES:  1.  Deformity of left breast after a left partial mastectomy.  2.  Disproportion of reconstructed left breast with patient's right breast.    POSTOPERATIVE DIAGNOSES:  1.  Deformity of left breast after a left partial mastectomy.  2.  Disproportion of reconstructed left breast with patient's right breast.    PROCEDURES PERFORMED:  1.  Left breast reconstruction with local tissue rearrangement.  2.  Right breast reduction for symmetry.    SURGEON:  Steven Schulz MD    ASSISTANT:  Shy Jean MD    ANESTHESIOLOGIST:  Any Bolton MD    ANESTHESIA TYPE:  General.    SPECIMENS:  Additional specimens for my portion of the procedure are right   breast tissue for permanent sectioning.    ESTIMATED BLOOD LOSS:  150 mL.    DRAINS:  10-[place] KEHINDE drains in each breast.    COMPLICATIONS:  None.    BRIEF HISTORY:  This is a 70-year-old female who was recently diagnosed with   left breast cancer.  She saw Dr. Jean for her surgical options and elected for   a partial mastectomy.  Because this would leave her with a significant defect   in her breast, we discussed performing breast reconstruction via local tissue   rearrangement.  Because this would leave her with a size and contour   discrepancy compared with the right breast, we discussed performing a right   breast reduction for symmetry.  Additionally, the patient had rather large   pendulous breasts.  We discussed taking a small amount of additional tissue   both to help with the cosmesis of the closure, as well as to help with the   stability and size of the breast for her pending radiation therapy.  Risks,   benefits and alternatives of the procedure were explained to her, risks   including bleeding, infection, anesthesia risks, wound healing issues,   potential loss of partial or all nipple/areola sensation or tissue, fat   necrosis, contour irregularities, asymmetries, seroma or hematoma formation,    poor cosmetic outcome and possibility of needing revisional surgery in the   future.  All of her questions were answered.    PROCEDURE IN DETAIL:  After informed consent was obtained, the patient was   marked in the preoperative holding area for a standard inferior pedicle Wise   pattern breast reduction.  She was then taken to the operating room and placed   on the table in the supine position.  After induction of general anesthesia,   her chest was prepped and draped in the usual sterile fashion.  A timeout was   called correctly identifying the patient and procedure.  She received 2 g of   Ancef IV preoperatively.  I began on the right side while Dr. Jean was   performing the partial mastectomy on the left side, which is dictated in a   separate operative note.  I marked both areola with a 42 mm areolar marking.    I then deepithelialized an inferior pedicle 8 cm wide at the base encompassing   the areola.  I made the remainder of the premarked Mullen pattern incisions   full thickness.  I excised small medial and lateral inferior breast triangles,   in continuity with the superior breast tissue after developing the pedicle   down to the chest wall.  This was done after seeing the amount of tissue that   Dr. Jean had resected from the left side.  This tissue was passed off as one   specimen for permanent sectioning.  Hemostasis was obtained with   electrocautery.  The area was irrigated with sterile saline.  A 10-[place]   drain was placed through a separate lateral stab incision.  I then advanced   the inferior pedicle cephalad and stabilized it against the chest wall with   2-0 Vicryl sutures.  After redraping the skin over the breast parenchyma, I   placed several key sutures with 2-0 Vicryl, followed by 3-0 Monocryl deep   dermal sutures and 4-0 Monocryl subcuticular sutures.  Once Dr. Jean was   finished on the left side, I then began the reconstruction.  I similarly   completed the incisions and  deepithelialized an inferior pedicle as on the   right.  The defect was located in the upper and lateral breast quadrants.  I   was able to advance the lateral most tissue cephalad to fill in the defect,   while advancing the pedicle superiorly as well.  Hemostasis was obtained with   electrocautery after irrigating with sterile saline.  The pedicle again was   stabilized against the chest wall with 2-0 Vicryl sutures, also stabilizing   the tissues to fill in the breast defect.  After redraping the skin, I closed   it in a similar fashion to the right side.  The incision lines were dressed   with Steri-Strips, ABD pads, and she was placed in a light compressive elastic   wrap.  Patient tolerated the procedure well and there were no complications.    Sponge and instrument counts were correct at the end of the case.  She was   extubated and taken to the recovery room in good condition.       ____________________________________     SOFYA CASTILLO MD P[facility] / [facility]    DD:  09/01/2020 18:59:11  DT:  09/01/2020 19:28:14    D#:  [id number]  Job#:  [id number]

## 2020-09-03 ENCOUNTER — HOSPITAL ENCOUNTER (OUTPATIENT)
Dept: RADIATION ONCOLOGY | Facility: MEDICAL CENTER | Age: 70
End: 2020-09-03
Payer: MEDICARE

## 2020-09-03 LAB
CHEMOTHERAPY INFUSION START DATE: NORMAL
CHEMOTHERAPY RECORDS: 2.67
CHEMOTHERAPY RECORDS: 4005
CHEMOTHERAPY RECORDS: NORMAL
CHEMOTHERAPY RX CANCER: NORMAL
DATE 1ST CHEMO CANCER: NORMAL
RAD ONC ARIA COURSE LAST TREATMENT DATE: NORMAL
RAD ONC ARIA COURSE TREATMENT ELAPSED DAYS: NORMAL
RAD ONC ARIA REFERENCE POINT DOSAGE GIVEN TO DATE: 24.03
RAD ONC ARIA REFERENCE POINT DOSAGE GIVEN TO DATE: 24.03
RAD ONC ARIA REFERENCE POINT ID: NORMAL
RAD ONC ARIA REFERENCE POINT ID: NORMAL
RAD ONC ARIA REFERENCE POINT SESSION DOSAGE GIVEN: 2.67
RAD ONC ARIA REFERENCE POINT SESSION DOSAGE GIVEN: 2.67

## 2020-09-03 PROCEDURE — 77412 RADIATION TX DELIVERY LVL 3: CPT | Performed by: RADIOLOGY

## 2020-09-04 ENCOUNTER — HOSPITAL ENCOUNTER (OUTPATIENT)
Dept: RADIATION ONCOLOGY | Facility: MEDICAL CENTER | Age: 70
End: 2020-09-04
Payer: MEDICARE

## 2020-09-04 LAB
CHEMOTHERAPY INFUSION START DATE: NORMAL
CHEMOTHERAPY RECORDS: 2.67
CHEMOTHERAPY RECORDS: 4005
CHEMOTHERAPY RECORDS: NORMAL
CHEMOTHERAPY RX CANCER: NORMAL
DATE 1ST CHEMO CANCER: NORMAL
RAD ONC ARIA COURSE LAST TREATMENT DATE: NORMAL
RAD ONC ARIA COURSE TREATMENT ELAPSED DAYS: NORMAL
RAD ONC ARIA REFERENCE POINT DOSAGE GIVEN TO DATE: 26.7
RAD ONC ARIA REFERENCE POINT DOSAGE GIVEN TO DATE: 26.7
RAD ONC ARIA REFERENCE POINT ID: NORMAL
RAD ONC ARIA REFERENCE POINT ID: NORMAL
RAD ONC ARIA REFERENCE POINT SESSION DOSAGE GIVEN: 2.67
RAD ONC ARIA REFERENCE POINT SESSION DOSAGE GIVEN: 2.67

## 2020-09-04 PROCEDURE — 77427 RADIATION TX MANAGEMENT X5: CPT | Performed by: RADIOLOGY

## 2020-09-04 PROCEDURE — 77412 RADIATION TX DELIVERY LVL 3: CPT | Performed by: RADIOLOGY

## 2020-09-08 ENCOUNTER — HOSPITAL ENCOUNTER (OUTPATIENT)
Dept: RADIATION ONCOLOGY | Facility: MEDICAL CENTER | Age: 70
End: 2020-09-08
Payer: MEDICARE

## 2020-09-08 LAB
CHEMOTHERAPY INFUSION START DATE: NORMAL
CHEMOTHERAPY RECORDS: 2.67
CHEMOTHERAPY RECORDS: 4005
CHEMOTHERAPY RECORDS: NORMAL
CHEMOTHERAPY RX CANCER: NORMAL
DATE 1ST CHEMO CANCER: NORMAL
RAD ONC ARIA COURSE LAST TREATMENT DATE: NORMAL
RAD ONC ARIA COURSE TREATMENT ELAPSED DAYS: NORMAL
RAD ONC ARIA REFERENCE POINT DOSAGE GIVEN TO DATE: 29.37
RAD ONC ARIA REFERENCE POINT DOSAGE GIVEN TO DATE: 29.37
RAD ONC ARIA REFERENCE POINT ID: NORMAL
RAD ONC ARIA REFERENCE POINT ID: NORMAL
RAD ONC ARIA REFERENCE POINT SESSION DOSAGE GIVEN: 2.67
RAD ONC ARIA REFERENCE POINT SESSION DOSAGE GIVEN: 2.67

## 2020-09-08 PROCEDURE — 77412 RADIATION TX DELIVERY LVL 3: CPT | Performed by: RADIOLOGY

## 2020-09-08 PROCEDURE — 77417 THER RADIOLOGY PORT IMAGE(S): CPT | Performed by: RADIOLOGY

## 2020-09-09 ENCOUNTER — HOSPITAL ENCOUNTER (OUTPATIENT)
Dept: RADIATION ONCOLOGY | Facility: MEDICAL CENTER | Age: 70
End: 2020-09-09
Payer: MEDICARE

## 2020-09-09 VITALS
SYSTOLIC BLOOD PRESSURE: 139 MMHG | OXYGEN SATURATION: 90 % | TEMPERATURE: 97.1 F | HEART RATE: 92 BPM | DIASTOLIC BLOOD PRESSURE: 80 MMHG

## 2020-09-09 LAB
CHEMOTHERAPY INFUSION START DATE: NORMAL
CHEMOTHERAPY RECORDS: 2.67
CHEMOTHERAPY RECORDS: 4005
CHEMOTHERAPY RECORDS: NORMAL
CHEMOTHERAPY RX CANCER: NORMAL
DATE 1ST CHEMO CANCER: NORMAL
RAD ONC ARIA COURSE LAST TREATMENT DATE: NORMAL
RAD ONC ARIA COURSE TREATMENT ELAPSED DAYS: NORMAL
RAD ONC ARIA REFERENCE POINT DOSAGE GIVEN TO DATE: 32.04
RAD ONC ARIA REFERENCE POINT DOSAGE GIVEN TO DATE: 32.04
RAD ONC ARIA REFERENCE POINT ID: NORMAL
RAD ONC ARIA REFERENCE POINT ID: NORMAL
RAD ONC ARIA REFERENCE POINT SESSION DOSAGE GIVEN: 2.67
RAD ONC ARIA REFERENCE POINT SESSION DOSAGE GIVEN: 2.67

## 2020-09-09 PROCEDURE — 77412 RADIATION TX DELIVERY LVL 3: CPT | Performed by: RADIOLOGY

## 2020-09-09 ASSESSMENT — PAIN SCALES - GENERAL: PAINLEVEL: NO PAIN

## 2020-09-09 NOTE — ON TREATMENT VISIT
ON TREATMENT NOTE  RADIATION ONCOLOGY DEPARTMENT    Patient name:  Fiona Daniels    Primary Physician:  Nba Rodríguez D.O. MRN: [id number]  CSN: [id number]   Referring physician:  Shy Jean M.D. : 1950, 70 y.o.     ENCOUNTER DATE:  20    DIAGNOSIS:    Carcinoma of upper-outer quadrant of left breast in female, estrogen receptor positive (HCC)  Staging form: Breast, AJCC 8th Edition  - Pathologic: Stage IA (pT1b, pN0, cM0, G1, ER+, DC+, HER2-) - Signed by Janes See M.D. on 2020  Histologic grading system: 3 grade system      TREATMENT SUMMARY:  Aria Treatment Information        Some values may be hidden. Unless noted otherwise, only the newest values recorded on each date are displayed.         Aria Treatment Summary 20   Course First Treatment Date 2020   Course Last Treatment Date 2020   L_Breast Plan from Course C1_LBreast   Fraction 12 of 15   Elapsed Course Days 16 @    Prescribed Fraction Dose 267 cGy   Prescribed Total Dose 4,005 cGy   L Breast DPV Reference Point from Course C1_LBreast   Elapsed Course Days 16    Session Dose 267 cGy   Total Dose 3,204 cGy   L Breast cp Reference Point from Course C1_LBreast   Elapsed Course Days 16 @    Session Dose 267 cGy   Total Dose 3,204 cGy              SUBJECTIVE:   Patient is doing well.  She is not starting to notice some erythema in the mid axillary line and inframammary fold.  Energy level is slightly depressed but not limiting.    VITAL SIGNS:    Encounter Vitals  Temperature: 36.2 °C (97.1 °F)  Blood Pressure : 139/80  Pulse: 92  Pulse Oximetry: 90 %  Pain Score: No pain  Pain Assessment 2020   Pain Score 0 0 0   Some recent data might be hidden          PHYSICAL EXAM:  Mild erythema in the treatment field of the mid axillary line and inframammary fold    TOXICITY  Toxicity Assessment 2020   Toxicity Assessment Breast Breast    Fatigue (lethargy, malaise, asthenia) None Increased fatigue over baseline, but not altering normal activities   Fever (in the absence of neutropenia) None None   Radiation Dermatitis None Faint erythema or dry desquamation   Lymphatics Mild lymphedema Normal   RT - Pain due to RT Mild pain not interfering with function None   Dyspnea Normal Normal         IMPRESSION:  Cancer Staging  Carcinoma of upper-outer quadrant of left breast in female, estrogen receptor positive (HCC)  Staging form: Breast, AJCC 8th Edition  - Clinical: No stage assigned - Unsigned  - Pathologic: Stage IA (pT1b, pN0, cM0, G1, ER+, HI+, HER2-) - Signed by Janes See M.D. on 8/5/2020      PLAN:  No change in treatment plan    Disposition:  Treatment plan reviewed. Questions answered. Continue therapy outlined.     Janes See M.D.    No orders of the defined types were placed in this encounter.

## 2020-09-10 ENCOUNTER — HOSPITAL ENCOUNTER (OUTPATIENT)
Dept: RADIATION ONCOLOGY | Facility: MEDICAL CENTER | Age: 70
End: 2020-09-10
Payer: MEDICARE

## 2020-09-10 LAB
CHEMOTHERAPY INFUSION START DATE: NORMAL
CHEMOTHERAPY RECORDS: 2.67
CHEMOTHERAPY RECORDS: 4005
CHEMOTHERAPY RECORDS: NORMAL
CHEMOTHERAPY RX CANCER: NORMAL
DATE 1ST CHEMO CANCER: NORMAL
RAD ONC ARIA COURSE LAST TREATMENT DATE: NORMAL
RAD ONC ARIA COURSE TREATMENT ELAPSED DAYS: NORMAL
RAD ONC ARIA REFERENCE POINT DOSAGE GIVEN TO DATE: 34.71
RAD ONC ARIA REFERENCE POINT DOSAGE GIVEN TO DATE: 34.71
RAD ONC ARIA REFERENCE POINT ID: NORMAL
RAD ONC ARIA REFERENCE POINT ID: NORMAL
RAD ONC ARIA REFERENCE POINT SESSION DOSAGE GIVEN: 2.67
RAD ONC ARIA REFERENCE POINT SESSION DOSAGE GIVEN: 2.67

## 2020-09-10 PROCEDURE — 77412 RADIATION TX DELIVERY LVL 3: CPT | Performed by: RADIOLOGY

## 2020-09-10 PROCEDURE — 77336 RADIATION PHYSICS CONSULT: CPT | Performed by: RADIOLOGY

## 2020-09-11 ENCOUNTER — HOSPITAL ENCOUNTER (OUTPATIENT)
Dept: RADIATION ONCOLOGY | Facility: MEDICAL CENTER | Age: 70
End: 2020-09-11
Payer: MEDICARE

## 2020-09-11 LAB
CHEMOTHERAPY INFUSION START DATE: NORMAL
CHEMOTHERAPY RECORDS: 2.67
CHEMOTHERAPY RECORDS: 4005
CHEMOTHERAPY RECORDS: NORMAL
CHEMOTHERAPY RX CANCER: NORMAL
DATE 1ST CHEMO CANCER: NORMAL
RAD ONC ARIA COURSE LAST TREATMENT DATE: NORMAL
RAD ONC ARIA COURSE TREATMENT ELAPSED DAYS: NORMAL
RAD ONC ARIA REFERENCE POINT DOSAGE GIVEN TO DATE: 37.38
RAD ONC ARIA REFERENCE POINT DOSAGE GIVEN TO DATE: 37.38
RAD ONC ARIA REFERENCE POINT ID: NORMAL
RAD ONC ARIA REFERENCE POINT ID: NORMAL
RAD ONC ARIA REFERENCE POINT SESSION DOSAGE GIVEN: 2.67
RAD ONC ARIA REFERENCE POINT SESSION DOSAGE GIVEN: 2.67

## 2020-09-11 PROCEDURE — 77412 RADIATION TX DELIVERY LVL 3: CPT | Performed by: RADIOLOGY

## 2020-09-14 ENCOUNTER — HOSPITAL ENCOUNTER (OUTPATIENT)
Dept: RADIATION ONCOLOGY | Facility: MEDICAL CENTER | Age: 70
End: 2020-09-14
Payer: MEDICARE

## 2020-09-14 LAB
CHEMOTHERAPY INFUSION START DATE: NORMAL
CHEMOTHERAPY RECORDS: 2.67
CHEMOTHERAPY RECORDS: 4005
CHEMOTHERAPY RECORDS: NORMAL
CHEMOTHERAPY RX CANCER: NORMAL
DATE 1ST CHEMO CANCER: NORMAL
RAD ONC ARIA COURSE LAST TREATMENT DATE: NORMAL
RAD ONC ARIA COURSE TREATMENT ELAPSED DAYS: NORMAL
RAD ONC ARIA REFERENCE POINT DOSAGE GIVEN TO DATE: 40.05
RAD ONC ARIA REFERENCE POINT DOSAGE GIVEN TO DATE: 40.05
RAD ONC ARIA REFERENCE POINT ID: NORMAL
RAD ONC ARIA REFERENCE POINT ID: NORMAL
RAD ONC ARIA REFERENCE POINT SESSION DOSAGE GIVEN: 2.67
RAD ONC ARIA REFERENCE POINT SESSION DOSAGE GIVEN: 2.67

## 2020-09-14 PROCEDURE — 77427 RADIATION TX MANAGEMENT X5: CPT | Performed by: RADIOLOGY

## 2020-09-14 PROCEDURE — 77412 RADIATION TX DELIVERY LVL 3: CPT | Performed by: RADIOLOGY

## 2020-09-16 LAB
CHEMOTHERAPY INFUSION START DATE: NORMAL
CHEMOTHERAPY INFUSION STOP DATE: NORMAL
CHEMOTHERAPY RECORDS: 2.67
CHEMOTHERAPY RECORDS: 4005
CHEMOTHERAPY RECORDS: NORMAL
CHEMOTHERAPY RX CANCER: NORMAL
DATE 1ST CHEMO CANCER: NORMAL
RAD ONC ARIA COURSE LAST TREATMENT DATE: NORMAL
RAD ONC ARIA COURSE TREATMENT ELAPSED DAYS: NORMAL
RAD ONC ARIA REFERENCE POINT DOSAGE GIVEN TO DATE: 40.05
RAD ONC ARIA REFERENCE POINT DOSAGE GIVEN TO DATE: 40.05
RAD ONC ARIA REFERENCE POINT ID: NORMAL
RAD ONC ARIA REFERENCE POINT ID: NORMAL

## 2020-10-06 ENCOUNTER — HOSPITAL ENCOUNTER (OUTPATIENT)
Dept: RADIOLOGY | Facility: MEDICAL CENTER | Age: 70
End: 2020-10-06
Attending: INTERNAL MEDICINE
Payer: MEDICARE

## 2020-10-06 DIAGNOSIS — M85.89 OTHER SPECIFIED DISORDERS OF BONE DENSITY AND STRUCTURE, MULTIPLE SITES: ICD-10-CM

## 2020-10-06 PROCEDURE — 77080 DXA BONE DENSITY AXIAL: CPT

## 2020-10-15 ENCOUNTER — APPOINTMENT (OUTPATIENT)
Dept: LAB | Facility: MEDICAL CENTER | Age: 70
End: 2020-10-15
Payer: MEDICARE

## 2020-10-15 ENCOUNTER — HOSPITAL ENCOUNTER (OUTPATIENT)
Dept: LAB | Facility: MEDICAL CENTER | Age: 70
End: 2020-10-15
Attending: INTERNAL MEDICINE
Payer: MEDICARE

## 2020-10-15 LAB
ALBUMIN SERPL BCP-MCNC: 4.2 G/DL (ref 3.2–4.9)
ALBUMIN/GLOB SERPL: 1.7 G/DL
ALP SERPL-CCNC: 71 U/L (ref 30–99)
ALT SERPL-CCNC: 20 U/L (ref 2–50)
ANION GAP SERPL CALC-SCNC: 11 MMOL/L (ref 7–16)
AST SERPL-CCNC: 20 U/L (ref 12–45)
BASOPHILS # BLD AUTO: 0.4 % (ref 0–1.8)
BASOPHILS # BLD: 0.02 K/UL (ref 0–0.12)
BILIRUB SERPL-MCNC: 1.2 MG/DL (ref 0.1–1.5)
BUN SERPL-MCNC: 21 MG/DL (ref 8–22)
CALCIUM SERPL-MCNC: 9.5 MG/DL (ref 8.5–10.5)
CHLORIDE SERPL-SCNC: 105 MMOL/L (ref 96–112)
CO2 SERPL-SCNC: 26 MMOL/L (ref 20–33)
CREAT SERPL-MCNC: 0.66 MG/DL (ref 0.5–1.4)
EOSINOPHIL # BLD AUTO: 0.15 K/UL (ref 0–0.51)
EOSINOPHIL NFR BLD: 3.3 % (ref 0–6.9)
ERYTHROCYTE [DISTWIDTH] IN BLOOD BY AUTOMATED COUNT: 48.8 FL (ref 35.9–50)
GLOBULIN SER CALC-MCNC: 2.5 G/DL (ref 1.9–3.5)
GLUCOSE SERPL-MCNC: 97 MG/DL (ref 65–99)
HCT VFR BLD AUTO: 47.2 % (ref 37–47)
HGB BLD-MCNC: 15.2 G/DL (ref 12–16)
IMM GRANULOCYTES # BLD AUTO: 0.01 K/UL (ref 0–0.11)
IMM GRANULOCYTES NFR BLD AUTO: 0.2 % (ref 0–0.9)
LYMPHOCYTES # BLD AUTO: 1.06 K/UL (ref 1–4.8)
LYMPHOCYTES NFR BLD: 23.4 % (ref 22–41)
MCH RBC QN AUTO: 32.5 PG (ref 27–33)
MCHC RBC AUTO-ENTMCNC: 32.2 G/DL (ref 33.6–35)
MCV RBC AUTO: 100.9 FL (ref 81.4–97.8)
MONOCYTES # BLD AUTO: 0.42 K/UL (ref 0–0.85)
MONOCYTES NFR BLD AUTO: 9.3 % (ref 0–13.4)
NEUTROPHILS # BLD AUTO: 2.87 K/UL (ref 2–7.15)
NEUTROPHILS NFR BLD: 63.4 % (ref 44–72)
NRBC # BLD AUTO: 0 K/UL
NRBC BLD-RTO: 0 /100 WBC
PLATELET # BLD AUTO: 196 K/UL (ref 164–446)
PMV BLD AUTO: 11.1 FL (ref 9–12.9)
POTASSIUM SERPL-SCNC: 3.8 MMOL/L (ref 3.6–5.5)
PROT SERPL-MCNC: 6.7 G/DL (ref 6–8.2)
RBC # BLD AUTO: 4.68 M/UL (ref 4.2–5.4)
SODIUM SERPL-SCNC: 142 MMOL/L (ref 135–145)
WBC # BLD AUTO: 4.5 K/UL (ref 4.8–10.8)

## 2020-10-15 PROCEDURE — 36415 COLL VENOUS BLD VENIPUNCTURE: CPT

## 2020-10-15 PROCEDURE — 85025 COMPLETE CBC W/AUTO DIFF WBC: CPT

## 2020-10-15 PROCEDURE — 82306 VITAMIN D 25 HYDROXY: CPT | Mod: GA

## 2020-10-15 PROCEDURE — 80053 COMPREHEN METABOLIC PANEL: CPT

## 2020-10-16 LAB — 25(OH)D3 SERPL-MCNC: 52 NG/ML (ref 30–100)

## 2020-10-20 ENCOUNTER — HOSPITAL ENCOUNTER (OUTPATIENT)
Dept: RADIATION ONCOLOGY | Facility: MEDICAL CENTER | Age: 70
End: 2020-10-20
Attending: RADIOLOGY
Payer: MEDICARE

## 2020-10-22 ENCOUNTER — HOSPITAL ENCOUNTER (OUTPATIENT)
Dept: RADIATION ONCOLOGY | Facility: MEDICAL CENTER | Age: 70
End: 2020-10-31
Attending: RADIOLOGY
Payer: MEDICARE

## 2020-10-22 NOTE — PROGRESS NOTES
Telephone Appointment Visit   As a means of avoiding spread of COVID-19, this visit is being conducted by telephone. This telephone visit was initiated by the patient and they verbally consented.    Time at start of call: 8:35am    Reason for Call:  Symptom Follow-up    HPI:    Stage IA ([id number]) G1, 2 foci, invasive ductal carcinoma of the left upper outer quadrant of breast ER NC positive HER-2/luis negative with a Ki-67 of 5% status post lumpectomy and sentinel lymph node biopsy with contralateral reduction mammoplasty on 7/21/2020 planning on anastrozole    Patient states immediately after finishing her radiation she did get slight increase in her erythema.  In the inframammary fold she experienced some peeling of the skin which she managed with Aquaphor.  At this point she feels she is roughly 80 to 90% back to her baseline with slight hyperpigmentation.  She continues on her anastrozole and has very mild hot flashes and remains somewhat more emotional than usual.  Otherwise she feels she is handling the medication very well.    Labs / Images Reviewed:   None    Assessment and Plan:     Patient continues to follow in medical oncology for her aromatase inhibitor I will release her from active follow-up in radiation oncology    Follow-up: As needed    Time at end of call: 8:57 AM  Total Time Spent: 21-30 minutes    Janes See M.D.

## 2020-10-26 ENCOUNTER — APPOINTMENT (RX ONLY)
Dept: URBAN - METROPOLITAN AREA CLINIC 4 | Facility: CLINIC | Age: 70
Setting detail: DERMATOLOGY
End: 2020-10-26

## 2020-10-26 DIAGNOSIS — D485 NEOPLASM OF UNCERTAIN BEHAVIOR OF SKIN: ICD-10-CM

## 2020-10-26 PROBLEM — D48.5 NEOPLASM OF UNCERTAIN BEHAVIOR OF SKIN: Status: ACTIVE | Noted: 2020-10-26

## 2020-10-26 PROCEDURE — 11102 TANGNTL BX SKIN SINGLE LES: CPT

## 2020-10-26 PROCEDURE — ? BIOPSY BY SHAVE METHOD

## 2020-10-26 ASSESSMENT — LOCATION DETAILED DESCRIPTION DERM: LOCATION DETAILED: LEFT DISTAL LATERAL POSTERIOR UPPER ARM

## 2020-10-26 ASSESSMENT — LOCATION ZONE DERM: LOCATION ZONE: ARM

## 2020-10-26 ASSESSMENT — LOCATION SIMPLE DESCRIPTION DERM: LOCATION SIMPLE: LEFT POSTERIOR UPPER ARM

## 2020-12-01 ENCOUNTER — APPOINTMENT (RX ONLY)
Dept: URBAN - METROPOLITAN AREA CLINIC 4 | Facility: CLINIC | Age: 70
Setting detail: DERMATOLOGY
End: 2020-12-01

## 2020-12-01 DIAGNOSIS — L72.8 OTHER FOLLICULAR CYSTS OF THE SKIN AND SUBCUTANEOUS TISSUE: ICD-10-CM

## 2020-12-01 PROCEDURE — ? EXCISION

## 2020-12-01 PROCEDURE — ? COUNSELING

## 2020-12-01 PROCEDURE — 11402 EXC TR-EXT B9+MARG 1.1-2 CM: CPT

## 2020-12-01 PROCEDURE — 12031 INTMD RPR S/A/T/EXT 2.5 CM/<: CPT

## 2020-12-01 ASSESSMENT — LOCATION ZONE DERM: LOCATION ZONE: TRUNK

## 2020-12-01 ASSESSMENT — LOCATION SIMPLE DESCRIPTION DERM: LOCATION SIMPLE: CHEST

## 2020-12-01 ASSESSMENT — LOCATION DETAILED DESCRIPTION DERM: LOCATION DETAILED: MIDDLE STERNUM

## 2020-12-01 NOTE — PROCEDURE: EXCISION
Medical Necessity Information: It is in your best interest to select a reason for this procedure from the list below. All of these items fulfill various CMS LCD requirements except lesion extends to a margin.
Include Z78.9 (Other Specified Conditions Influencing Health Status) As An Associated Diagnosis?: No
Medical Necessity Clause: This procedure was medically necessary because the lesion that was treated was:
Lab: [id number]
Lab Facility: 
Surgeon (Optional): Dr. Costa
Size Of Lesion In Cm: 1.3
X Size Of Lesion In Cm (Optional): 0
Size Of Margin In Cm: 0.1
Anesthesia Volume In Cc: 1
Excision Method: Round
Repair Type: Intermediate
Suturegard Retention Suture: 2-0 Nylon
Retention Suture Bite Size: 3 mm
Length To Time In Minutes Device Was In Place: 10
Undermining Type: Entire Wound
Debridement Text: The wound edges were debrided prior to proceeding with the closure to facilitate wound healing.
Helical Rim Text: The closure involved the helical rim.
Vermilion Border Text: The closure involved the vermilion border.
Nostril Rim Text: The closure involved the nostril rim.
Retention Suture Text: Retention sutures were placed to support the closure and prevent dehiscence.
Epidermal Closure Graft Donor Site (Optional): simple interrupted
Graft Donor Site Bandage (Optional-Leave Blank If You Don't Want In Note): Steri-strips and a pressure bandage were applied to the donor site.
Detail Level: Detailed
Excision Depth: adipose tissue
Scalpel Size: 15 blade
Anesthesia Type: 1% lidocaine with epinephrine and a 1:10 solution of 8.4% sodium bicarbonate
Additional Anesthesia Volume In Cc: 6
Hemostasis: Electrocautery
Estimated Blood Loss (Cc): minimal
Deep Sutures: 4-0 Maxon
Dermal Closure: buried vertical mattress
Epidermal Sutures: 4-0 Caprosyn
Epidermal Closure: running subcuticular
Wound Care: Vaseline
Dressing: dry sterile dressing
Suturegard Intro: Intraoperative tissue expansion was performed, utilizing the SUTUREGARD device, in order to reduce wound tension.
Suturegard Body: The suture ends were repeatedly re-tightened and re-clamped to achieve the desired tissue expansion.
Hemigard Intro: Due to skin fragility and wound tension, it was decided to use HEMIGARD adhesive retention suture devices to permit a linear closure. The skin was cleaned and dried for a 6cm distance away from the wound. Excessive hair, if present, was removed to allow for adhesion.
Hemigard Postcare Instructions: The HEMIGARD strips are to remain completely dry for at least 5-7 days.
Complex Repair Preamble Text (Leave Blank If You Do Not Want): Extensive wide undermining was performed.
Intermediate Repair Preamble Text (Leave Blank If You Do Not Want): Undermining was performed with blunt dissection.
Fusiform Excision Additional Text (Leave Blank If You Do Not Want): The margin was drawn around the clinically apparent lesion.  A fusiform shape was then drawn on the skin incorporating the lesion and margins.  Incisions were then made along these lines to the appropriate tissue plane and the lesion was extirpated.
Eliptical Excision Additional Text (Leave Blank If You Do Not Want): The margin was drawn around the clinically apparent lesion.  An elliptical shape was then drawn on the skin incorporating the lesion and margins.  Incisions were then made along these lines to the appropriate tissue plane and the lesion was extirpated.
Saucerization Excision Additional Text (Leave Blank If You Do Not Want): The margin was drawn around the clinically apparent lesion.  Incisions were then made along these lines, in a tangential fashion, to the appropriate tissue plane and the lesion was extirpated.
Slit Excision Additional Text (Leave Blank If You Do Not Want): A linear line was drawn on the skin overlying the lesion. An incision was made slowly until the lesion was visualized.  Once visualized, the lesion was removed with blunt dissection.
Excisional Biopsy Additional Text (Leave Blank If You Do Not Want): The margin was drawn around the clinically apparent lesion. An elliptical shape was then drawn on the skin incorporating the lesion and margins.  Incisions were then made along these lines to the appropriate tissue plane and the lesion was extirpated.
Perilesional Excision Additional Text (Leave Blank If You Do Not Want): The margin was drawn around the clinically apparent lesion. Incisions were then made along these lines to the appropriate tissue plane and the lesion was extirpated.
Repair Performed By Another Provider Text (Leave Blank If You Do Not Want): After the tissue was excised the defect was repaired by another provider.
No Repair - Repaired With Adjacent Surgical Defect Text (Leave Blank If You Do Not Want): After the excision the defect was repaired concurrently with another surgical defect which was in close approximation.
Advancement Flap (Single) Text: The defect edges were debeveled with a #15 scalpel blade.  Given the location of the defect and the proximity to free margins a single advancement flap was deemed most appropriate.  Using a sterile surgical marker, an appropriate advancement flap was drawn incorporating the defect and placing the expected incisions within the relaxed skin tension lines where possible.    The area thus outlined was incised deep to adipose tissue with a #15 scalpel blade.  The skin margins were undermined to an appropriate distance in all directions utilizing iris scissors.
Advancement Flap (Double) Text: The defect edges were debeveled with a #15 scalpel blade.  Given the location of the defect and the proximity to free margins a double advancement flap was deemed most appropriate.  Using a sterile surgical marker, the appropriate advancement flaps were drawn incorporating the defect and placing the expected incisions within the relaxed skin tension lines where possible.    The area thus outlined was incised deep to adipose tissue with a #15 scalpel blade.  The skin margins were undermined to an appropriate distance in all directions utilizing iris scissors.
Burow's Advancement Flap Text: The defect edges were debeveled with a #15 scalpel blade.  Given the location of the defect and the proximity to free margins a Burow's advancement flap was deemed most appropriate.  Using a sterile surgical marker, the appropriate advancement flap was drawn incorporating the defect and placing the expected incisions within the relaxed skin tension lines where possible.    The area thus outlined was incised deep to adipose tissue with a #15 scalpel blade.  The skin margins were undermined to an appropriate distance in all directions utilizing iris scissors.
Chonodrocutaneous Helical Advancement Flap Text: The defect edges were debeveled with a #15 scalpel blade.  Given the location of the defect and the proximity to free margins a chondrocutaneous helical advancement flap was deemed most appropriate.  Using a sterile surgical marker, the appropriate advancement flap was drawn incorporating the defect and placing the expected incisions within the relaxed skin tension lines where possible.    The area thus outlined was incised deep to adipose tissue with a #15 scalpel blade.  The skin margins were undermined to an appropriate distance in all directions utilizing iris scissors.
Crescentic Advancement Flap Text: The defect edges were debeveled with a #15 scalpel blade.  Given the location of the defect and the proximity to free margins a crescentic advancement flap was deemed most appropriate.  Using a sterile surgical marker, the appropriate advancement flap was drawn incorporating the defect and placing the expected incisions within the relaxed skin tension lines where possible.    The area thus outlined was incised deep to adipose tissue with a #15 scalpel blade.  The skin margins were undermined to an appropriate distance in all directions utilizing iris scissors.
A-T Advancement Flap Text: The defect edges were debeveled with a #15 scalpel blade.  Given the location of the defect, shape of the defect and the proximity to free margins an A-T advancement flap was deemed most appropriate.  Using a sterile surgical marker, an appropriate advancement flap was drawn incorporating the defect and placing the expected incisions within the relaxed skin tension lines where possible.    The area thus outlined was incised deep to adipose tissue with a #15 scalpel blade.  The skin margins were undermined to an appropriate distance in all directions utilizing iris scissors.
O-T Advancement Flap Text: The defect edges were debeveled with a #15 scalpel blade.  Given the location of the defect, shape of the defect and the proximity to free margins an O-T advancement flap was deemed most appropriate.  Using a sterile surgical marker, an appropriate advancement flap was drawn incorporating the defect and placing the expected incisions within the relaxed skin tension lines where possible.    The area thus outlined was incised deep to adipose tissue with a #15 scalpel blade.  The skin margins were undermined to an appropriate distance in all directions utilizing iris scissors.
O-L Flap Text: The defect edges were debeveled with a #15 scalpel blade.  Given the location of the defect, shape of the defect and the proximity to free margins an O-L flap was deemed most appropriate.  Using a sterile surgical marker, an appropriate advancement flap was drawn incorporating the defect and placing the expected incisions within the relaxed skin tension lines where possible.    The area thus outlined was incised deep to adipose tissue with a #15 scalpel blade.  The skin margins were undermined to an appropriate distance in all directions utilizing iris scissors.
O-Z Flap Text: The defect edges were debeveled with a #15 scalpel blade.  Given the location of the defect, shape of the defect and the proximity to free margins an O-Z flap was deemed most appropriate.  Using a sterile surgical marker, an appropriate transposition flap was drawn incorporating the defect and placing the expected incisions within the relaxed skin tension lines where possible. The area thus outlined was incised deep to adipose tissue with a #15 scalpel blade.  The skin margins were undermined to an appropriate distance in all directions utilizing iris scissors.
Double O-Z Flap Text: The defect edges were debeveled with a #15 scalpel blade.  Given the location of the defect, shape of the defect and the proximity to free margins a Double O-Z flap was deemed most appropriate.  Using a sterile surgical marker, an appropriate transposition flap was drawn incorporating the defect and placing the expected incisions within the relaxed skin tension lines where possible. The area thus outlined was incised deep to adipose tissue with a #15 scalpel blade.  The skin margins were undermined to an appropriate distance in all directions utilizing iris scissors.
V-Y Flap Text: The defect edges were debeveled with a #15 scalpel blade.  Given the location of the defect, shape of the defect and the proximity to free margins a V-Y flap was deemed most appropriate.  Using a sterile surgical marker, an appropriate advancement flap was drawn incorporating the defect and placing the expected incisions within the relaxed skin tension lines where possible.    The area thus outlined was incised deep to adipose tissue with a #15 scalpel blade.  The skin margins were undermined to an appropriate distance in all directions utilizing iris scissors.
Mercedes Flap Text: The defect edges were debeveled with a #15 scalpel blade.  Given the location of the defect, shape of the defect and the proximity to free margins a Mercedes flap was deemed most appropriate.  Using a sterile surgical marker, an appropriate advancement flap was drawn incorporating the defect and placing the expected incisions within the relaxed skin tension lines where possible. The area thus outlined was incised deep to adipose tissue with a #15 scalpel blade.  The skin margins were undermined to an appropriate distance in all directions utilizing iris scissors.
Modified Advancement Flap Text: The defect edges were debeveled with a #15 scalpel blade.  Given the location of the defect, shape of the defect and the proximity to free margins a modified advancement flap was deemed most appropriate.  Using a sterile surgical marker, an appropriate advancement flap was drawn incorporating the defect and placing the expected incisions within the relaxed skin tension lines where possible.    The area thus outlined was incised deep to adipose tissue with a #15 scalpel blade.  The skin margins were undermined to an appropriate distance in all directions utilizing iris scissors.
Mucosal Advancement Flap Text: Given the location of the defect, shape of the defect and the proximity to free margins a mucosal advancement flap was deemed most appropriate. Incisions were made with a 15 blade scalpel in the appropriate fashion along the cutaneous vermillion border and the mucosal lip. The remaining actinically damaged mucosal tissue was excised.  The mucosal advancement flap was then elevated to the gingival sulcus with care taken to preserve the neurovascular structures and advanced into the primary defect. Care was taken to ensure that precise realignment of the vermilion border was achieved.
Hatchet Flap Text: The defect edges were debeveled with a #15 scalpel blade.  Given the location of the defect, shape of the defect and the proximity to free margins a hatchet flap was deemed most appropriate.  Using a sterile surgical marker, an appropriate hatchet flap was drawn incorporating the defect and placing the expected incisions within the relaxed skin tension lines where possible.    The area thus outlined was incised deep to adipose tissue with a #15 scalpel blade.  The skin margins were undermined to an appropriate distance in all directions utilizing iris scissors.
Rotation Flap Text: The defect edges were debeveled with a #15 scalpel blade.  Given the location of the defect, shape of the defect and the proximity to free margins a rotation flap was deemed most appropriate.  Using a sterile surgical marker, an appropriate rotation flap was drawn incorporating the defect and placing the expected incisions within the relaxed skin tension lines where possible.    The area thus outlined was incised deep to adipose tissue with a #15 scalpel blade.  The skin margins were undermined to an appropriate distance in all directions utilizing iris scissors.
Spiral Flap Text: The defect edges were debeveled with a #15 scalpel blade.  Given the location of the defect, shape of the defect and the proximity to free margins a spiral flap was deemed most appropriate.  Using a sterile surgical marker, an appropriate rotation flap was drawn incorporating the defect and placing the expected incisions within the relaxed skin tension lines where possible. The area thus outlined was incised deep to adipose tissue with a #15 scalpel blade.  The skin margins were undermined to an appropriate distance in all directions utilizing iris scissors.
Star Wedge Flap Text: The defect edges were debeveled with a #15 scalpel blade.  Given the location of the defect, shape of the defect and the proximity to free margins a star wedge flap was deemed most appropriate.  Using a sterile surgical marker, an appropriate rotation flap was drawn incorporating the defect and placing the expected incisions within the relaxed skin tension lines where possible. The area thus outlined was incised deep to adipose tissue with a #15 scalpel blade.  The skin margins were undermined to an appropriate distance in all directions utilizing iris scissors.
Transposition Flap Text: The defect edges were debeveled with a #15 scalpel blade.  Given the location of the defect and the proximity to free margins a transposition flap was deemed most appropriate.  Using a sterile surgical marker, an appropriate transposition flap was drawn incorporating the defect.    The area thus outlined was incised deep to adipose tissue with a #15 scalpel blade.  The skin margins were undermined to an appropriate distance in all directions utilizing iris scissors.
Muscle Hinge Flap Text: The defect edges were debeveled with a #15 scalpel blade.  Given the size, depth and location of the defect and the proximity to free margins a muscle hinge flap was deemed most appropriate.  Using a sterile surgical marker, an appropriate hinge flap was drawn incorporating the defect. The area thus outlined was incised with a #15 scalpel blade.  The skin margins were undermined to an appropriate distance in all directions utilizing iris scissors.
Melolabial Transposition Flap Text: The defect edges were debeveled with a #15 scalpel blade.  Given the location of the defect and the proximity to free margins a melolabial flap was deemed most appropriate.  Using a sterile surgical marker, an appropriate melolabial transposition flap was drawn incorporating the defect.    The area thus outlined was incised deep to adipose tissue with a #15 scalpel blade.  The skin margins were undermined to an appropriate distance in all directions utilizing iris scissors.
Rhombic Flap Text: The defect edges were debeveled with a #15 scalpel blade.  Given the location of the defect and the proximity to free margins a rhombic flap was deemed most appropriate.  Using a sterile surgical marker, an appropriate rhombic flap was drawn incorporating the defect.    The area thus outlined was incised deep to adipose tissue with a #15 scalpel blade.  The skin margins were undermined to an appropriate distance in all directions utilizing iris scissors.
Rhomboid Transposition Flap Text: The defect edges were debeveled with a #15 scalpel blade.  Given the location of the defect and the proximity to free margins a rhomboid transposition flap was deemed most appropriate.  Using a sterile surgical marker, an appropriate rhomboid flap was drawn incorporating the defect.    The area thus outlined was incised deep to adipose tissue with a #15 scalpel blade.  The skin margins were undermined to an appropriate distance in all directions utilizing iris scissors.
Bi-Rhombic Flap Text: The defect edges were debeveled with a #15 scalpel blade.  Given the location of the defect and the proximity to free margins a bi-rhombic flap was deemed most appropriate.  Using a sterile surgical marker, an appropriate rhombic flap was drawn incorporating the defect. The area thus outlined was incised deep to adipose tissue with a #15 scalpel blade.  The skin margins were undermined to an appropriate distance in all directions utilizing iris scissors.
Helical Rim Advancement Flap Text: The defect edges were debeveled with a #15 blade scalpel.  Given the location of the defect and the proximity to free margins (helical rim) a double helical rim advancement flap was deemed most appropriate.  Using a sterile surgical marker, the appropriate advancement flaps were drawn incorporating the defect and placing the expected incisions between the helical rim and antihelix where possible.  The area thus outlined was incised through and through with a #15 scalpel blade.  With a skin hook and iris scissors, the flaps were gently and sharply undermined and freed up.
Bilateral Helical Rim Advancement Flap Text: The defect edges were debeveled with a #15 blade scalpel.  Given the location of the defect and the proximity to free margins (helical rim) a bilateral helical rim advancement flap was deemed most appropriate.  Using a sterile surgical marker, the appropriate advancement flaps were drawn incorporating the defect and placing the expected incisions between the helical rim and antihelix where possible.  The area thus outlined was incised through and through with a #15 scalpel blade.  With a skin hook and iris scissors, the flaps were gently and sharply undermined and freed up.
Ear Star Wedge Flap Text: The defect edges were debeveled with a #15 blade scalpel.  Given the location of the defect and the proximity to free margins (helical rim) an ear star wedge flap was deemed most appropriate.  Using a sterile surgical marker, the appropriate flap was drawn incorporating the defect and placing the expected incisions between the helical rim and antihelix where possible.  The area thus outlined was incised through and through with a #15 scalpel blade.
Banner Transposition Flap Text: The defect edges were debeveled with a #15 scalpel blade.  Given the location of the defect and the proximity to free margins a Banner transposition flap was deemed most appropriate.  Using a sterile surgical marker, an appropriate flap drawn around the defect. The area thus outlined was incised deep to adipose tissue with a #15 scalpel blade.  The skin margins were undermined to an appropriate distance in all directions utilizing iris scissors.
Bilobed Flap Text: The defect edges were debeveled with a #15 scalpel blade.  Given the location of the defect and the proximity to free margins a bilobe flap was deemed most appropriate.  Using a sterile surgical marker, an appropriate bilobe flap drawn around the defect.    The area thus outlined was incised deep to adipose tissue with a #15 scalpel blade.  The skin margins were undermined to an appropriate distance in all directions utilizing iris scissors.
Bilobed Transposition Flap Text: The defect edges were debeveled with a #15 scalpel blade.  Given the location of the defect and the proximity to free margins a bilobed transposition flap was deemed most appropriate.  Using a sterile surgical marker, an appropriate bilobe flap drawn around the defect.    The area thus outlined was incised deep to adipose tissue with a #15 scalpel blade.  The skin margins were undermined to an appropriate distance in all directions utilizing iris scissors.
Trilobed Flap Text: The defect edges were debeveled with a #15 scalpel blade.  Given the location of the defect and the proximity to free margins a trilobed flap was deemed most appropriate.  Using a sterile surgical marker, an appropriate trilobed flap drawn around the defect.    The area thus outlined was incised deep to adipose tissue with a #15 scalpel blade.  The skin margins were undermined to an appropriate distance in all directions utilizing iris scissors.
Dorsal Nasal Flap Text: The defect edges were debeveled with a #15 scalpel blade.  Given the location of the defect and the proximity to free margins a dorsal nasal flap was deemed most appropriate.  Using a sterile surgical marker, an appropriate dorsal nasal flap was drawn around the defect.    The area thus outlined was incised deep to adipose tissue with a #15 scalpel blade.  The skin margins were undermined to an appropriate distance in all directions utilizing iris scissors.
Island Pedicle Flap Text: The defect edges were debeveled with a #15 scalpel blade.  Given the location of the defect, shape of the defect and the proximity to free margins an island pedicle advancement flap was deemed most appropriate.  Using a sterile surgical marker, an appropriate advancement flap was drawn incorporating the defect, outlining the appropriate donor tissue and placing the expected incisions within the relaxed skin tension lines where possible.    The area thus outlined was incised deep to adipose tissue with a #15 scalpel blade.  The skin margins were undermined to an appropriate distance in all directions around the primary defect and laterally outward around the island pedicle utilizing iris scissors.  There was minimal undermining beneath the pedicle flap.
Island Pedicle Flap With Canthal Suspension Text: The defect edges were debeveled with a #15 scalpel blade.  Given the location of the defect, shape of the defect and the proximity to free margins an island pedicle advancement flap was deemed most appropriate.  Using a sterile surgical marker, an appropriate advancement flap was drawn incorporating the defect, outlining the appropriate donor tissue and placing the expected incisions within the relaxed skin tension lines where possible. The area thus outlined was incised deep to adipose tissue with a #15 scalpel blade.  The skin margins were undermined to an appropriate distance in all directions around the primary defect and laterally outward around the island pedicle utilizing iris scissors.  There was minimal undermining beneath the pedicle flap. A suspension suture was placed in the canthal tendon to prevent tension and prevent ectropion.
Alar Island Pedicle Flap Text: The defect edges were debeveled with a #15 scalpel blade.  Given the location of the defect, shape of the defect and the proximity to the alar rim an island pedicle advancement flap was deemed most appropriate.  Using a sterile surgical marker, an appropriate advancement flap was drawn incorporating the defect, outlining the appropriate donor tissue and placing the expected incisions within the nasal ala running parallel to the alar rim. The area thus outlined was incised with a #15 scalpel blade.  The skin margins were undermined minimally to an appropriate distance in all directions around the primary defect and laterally outward around the island pedicle utilizing iris scissors.  There was minimal undermining beneath the pedicle flap.
Double Island Pedicle Flap Text: The defect edges were debeveled with a #15 scalpel blade.  Given the location of the defect, shape of the defect and the proximity to free margins a double island pedicle advancement flap was deemed most appropriate.  Using a sterile surgical marker, an appropriate advancement flap was drawn incorporating the defect, outlining the appropriate donor tissue and placing the expected incisions within the relaxed skin tension lines where possible.    The area thus outlined was incised deep to adipose tissue with a #15 scalpel blade.  The skin margins were undermined to an appropriate distance in all directions around the primary defect and laterally outward around the island pedicle utilizing iris scissors.  There was minimal undermining beneath the pedicle flap.
Island Pedicle Flap-Requiring Vessel Identification Text: The defect edges were debeveled with a #15 scalpel blade.  Given the location of the defect, shape of the defect and the proximity to free margins an island pedicle advancement flap was deemed most appropriate.  Using a sterile surgical marker, an appropriate advancement flap was drawn, based on the axial vessel mentioned above, incorporating the defect, outlining the appropriate donor tissue and placing the expected incisions within the relaxed skin tension lines where possible.    The area thus outlined was incised deep to adipose tissue with a #15 scalpel blade.  The skin margins were undermined to an appropriate distance in all directions around the primary defect and laterally outward around the island pedicle utilizing iris scissors.  There was minimal undermining beneath the pedicle flap.
Keystone Flap Text: The defect edges were debeveled with a #15 scalpel blade.  Given the location of the defect, shape of the defect a keystone flap was deemed most appropriate.  Using a sterile surgical marker, an appropriate keystone flap was drawn incorporating the defect, outlining the appropriate donor tissue and placing the expected incisions within the relaxed skin tension lines where possible. The area thus outlined was incised deep to adipose tissue with a #15 scalpel blade.  The skin margins were undermined to an appropriate distance in all directions around the primary defect and laterally outward around the flap utilizing iris scissors.
O-T Plasty Text: The defect edges were debeveled with a #15 scalpel blade.  Given the location of the defect, shape of the defect and the proximity to free margins an O-T plasty was deemed most appropriate.  Using a sterile surgical marker, an appropriate O-T plasty was drawn incorporating the defect and placing the expected incisions within the relaxed skin tension lines where possible.    The area thus outlined was incised deep to adipose tissue with a #15 scalpel blade.  The skin margins were undermined to an appropriate distance in all directions utilizing iris scissors.
O-Z Plasty Text: The defect edges were debeveled with a #15 scalpel blade.  Given the location of the defect, shape of the defect and the proximity to free margins an O-Z plasty (double transposition flap) was deemed most appropriate.  Using a sterile surgical marker, the appropriate transposition flaps were drawn incorporating the defect and placing the expected incisions within the relaxed skin tension lines where possible.    The area thus outlined was incised deep to adipose tissue with a #15 scalpel blade.  The skin margins were undermined to an appropriate distance in all directions utilizing iris scissors.  Hemostasis was achieved with electrocautery.  The flaps were then transposed into place, one clockwise and the other counterclockwise, and anchored with interrupted buried subcutaneous sutures.
Double O-Z Plasty Text: The defect edges were debeveled with a #15 scalpel blade.  Given the location of the defect, shape of the defect and the proximity to free margins a Double O-Z plasty (double transposition flap) was deemed most appropriate.  Using a sterile surgical marker, the appropriate transposition flaps were drawn incorporating the defect and placing the expected incisions within the relaxed skin tension lines where possible. The area thus outlined was incised deep to adipose tissue with a #15 scalpel blade.  The skin margins were undermined to an appropriate distance in all directions utilizing iris scissors.  Hemostasis was achieved with electrocautery.  The flaps were then transposed into place, one clockwise and the other counterclockwise, and anchored with interrupted buried subcutaneous sutures.
V-Y Plasty Text: The defect edges were debeveled with a #15 scalpel blade.  Given the location of the defect, shape of the defect and the proximity to free margins an V-Y advancement flap was deemed most appropriate.  Using a sterile surgical marker, an appropriate advancement flap was drawn incorporating the defect and placing the expected incisions within the relaxed skin tension lines where possible.    The area thus outlined was incised deep to adipose tissue with a #15 scalpel blade.  The skin margins were undermined to an appropriate distance in all directions utilizing iris scissors.
H Plasty Text: Given the location of the defect, shape of the defect and the proximity to free margins a H-plasty was deemed most appropriate for repair.  Using a sterile surgical marker, the appropriate advancement arms of the H-plasty were drawn incorporating the defect and placing the expected incisions within the relaxed skin tension lines where possible. The area thus outlined was incised deep to adipose tissue with a #15 scalpel blade. The skin margins were undermined to an appropriate distance in all directions utilizing iris scissors.  The opposing advancement arms were then advanced into place in opposite direction and anchored with interrupted buried subcutaneous sutures.
W Plasty Text: The lesion was extirpated to the level of the fat with a #15 scalpel blade.  Given the location of the defect, shape of the defect and the proximity to free margins a W-plasty was deemed most appropriate for repair.  Using a sterile surgical marker, the appropriate transposition arms of the W-plasty were drawn incorporating the defect and placing the expected incisions within the relaxed skin tension lines where possible.    The area thus outlined was incised deep to adipose tissue with a #15 scalpel blade.  The skin margins were undermined to an appropriate distance in all directions utilizing iris scissors.  The opposing transposition arms were then transposed into place in opposite direction and anchored with interrupted buried subcutaneous sutures.
Z Plasty Text: The lesion was extirpated to the level of the fat with a #15 scalpel blade.  Given the location of the defect, shape of the defect and the proximity to free margins a Z-plasty was deemed most appropriate for repair.  Using a sterile surgical marker, the appropriate transposition arms of the Z-plasty were drawn incorporating the defect and placing the expected incisions within the relaxed skin tension lines where possible.    The area thus outlined was incised deep to adipose tissue with a #15 scalpel blade.  The skin margins were undermined to an appropriate distance in all directions utilizing iris scissors.  The opposing transposition arms were then transposed into place in opposite direction and anchored with interrupted buried subcutaneous sutures.
Zygomaticofacial Flap Text: Given the location of the defect, shape of the defect and the proximity to free margins a zygomaticofacial flap was deemed most appropriate for repair.  Using a sterile surgical marker, the appropriate flap was drawn incorporating the defect and placing the expected incisions within the relaxed skin tension lines where possible. The area thus outlined was incised deep to adipose tissue with a #15 scalpel blade with preservation of a vascular pedicle.  The skin margins were undermined to an appropriate distance in all directions utilizing iris scissors.  The flap was then placed into the defect and anchored with interrupted buried subcutaneous sutures.
Cheek Interpolation Flap Text: A decision was made to reconstruct the defect utilizing an interpolation axial flap and a staged reconstruction.  A telfa template was made of the defect.  This telfa template was then used to outline the Cheek Interpolation flap.  The donor area for the pedicle flap was then injected with anesthesia.  The flap was excised through the skin and subcutaneous tissue down to the layer of the underlying musculature.  The interpolation flap was carefully excised within this deep plane to maintain its blood supply.  The edges of the donor site were undermined.   The donor site was closed in a primary fashion.  The pedicle was then rotated into position and sutured.  Once the tube was sutured into place, adequate blood supply was confirmed with blanching and refill.  The pedicle was then wrapped with xeroform gauze and dressed appropriately with a telfa and gauze bandage to ensure continued blood supply and protect the attached pedicle.
Cheek-To-Nose Interpolation Flap Text: A decision was made to reconstruct the defect utilizing an interpolation axial flap and a staged reconstruction.  A telfa template was made of the defect.  This telfa template was then used to outline the Cheek-To-Nose Interpolation flap.  The donor area for the pedicle flap was then injected with anesthesia.  The flap was excised through the skin and subcutaneous tissue down to the layer of the underlying musculature.  The interpolation flap was carefully excised within this deep plane to maintain its blood supply.  The edges of the donor site were undermined.   The donor site was closed in a primary fashion.  The pedicle was then rotated into position and sutured.  Once the tube was sutured into place, adequate blood supply was confirmed with blanching and refill.  The pedicle was then wrapped with xeroform gauze and dressed appropriately with a telfa and gauze bandage to ensure continued blood supply and protect the attached pedicle.
Interpolation Flap Text: A decision was made to reconstruct the defect utilizing an interpolation axial flap and a staged reconstruction.  A telfa template was made of the defect.  This telfa template was then used to outline the interpolation flap.  The donor area for the pedicle flap was then injected with anesthesia.  The flap was excised through the skin and subcutaneous tissue down to the layer of the underlying musculature.  The interpolation flap was carefully excised within this deep plane to maintain its blood supply.  The edges of the donor site were undermined.   The donor site was closed in a primary fashion.  The pedicle was then rotated into position and sutured.  Once the tube was sutured into place, adequate blood supply was confirmed with blanching and refill.  The pedicle was then wrapped with xeroform gauze and dressed appropriately with a telfa and gauze bandage to ensure continued blood supply and protect the attached pedicle.
Melolabial Interpolation Flap Text: A decision was made to reconstruct the defect utilizing an interpolation axial flap and a staged reconstruction.  A telfa template was made of the defect.  This telfa template was then used to outline the melolabial interpolation flap.  The donor area for the pedicle flap was then injected with anesthesia.  The flap was excised through the skin and subcutaneous tissue down to the layer of the underlying musculature.  The pedicle flap was carefully excised within this deep plane to maintain its blood supply.  The edges of the donor site were undermined.   The donor site was closed in a primary fashion.  The pedicle was then rotated into position and sutured.  Once the tube was sutured into place, adequate blood supply was confirmed with blanching and refill.  The pedicle was then wrapped with xeroform gauze and dressed appropriately with a telfa and gauze bandage to ensure continued blood supply and protect the attached pedicle.
Mastoid Interpolation Flap Text: A decision was made to reconstruct the defect utilizing an interpolation axial flap and a staged reconstruction.  A telfa template was made of the defect.  This telfa template was then used to outline the mastoid interpolation flap.  The donor area for the pedicle flap was then injected with anesthesia.  The flap was excised through the skin and subcutaneous tissue down to the layer of the underlying musculature.  The pedicle flap was carefully excised within this deep plane to maintain its blood supply.  The edges of the donor site were undermined.   The donor site was closed in a primary fashion.  The pedicle was then rotated into position and sutured.  Once the tube was sutured into place, adequate blood supply was confirmed with blanching and refill.  The pedicle was then wrapped with xeroform gauze and dressed appropriately with a telfa and gauze bandage to ensure continued blood supply and protect the attached pedicle.
Posterior Auricular Interpolation Flap Text: A decision was made to reconstruct the defect utilizing an interpolation axial flap and a staged reconstruction.  A telfa template was made of the defect.  This telfa template was then used to outline the posterior auricular interpolation flap.  The donor area for the pedicle flap was then injected with anesthesia.  The flap was excised through the skin and subcutaneous tissue down to the layer of the underlying musculature.  The pedicle flap was carefully excised within this deep plane to maintain its blood supply.  The edges of the donor site were undermined.   The donor site was closed in a primary fashion.  The pedicle was then rotated into position and sutured.  Once the tube was sutured into place, adequate blood supply was confirmed with blanching and refill.  The pedicle was then wrapped with xeroform gauze and dressed appropriately with a telfa and gauze bandage to ensure continued blood supply and protect the attached pedicle.
Paramedian Forehead Flap Text: A decision was made to reconstruct the defect utilizing an interpolation axial flap and a staged reconstruction.  A telfa template was made of the defect.  This telfa template was then used to outline the paramedian forehead pedicle flap.  The donor area for the pedicle flap was then injected with anesthesia.  The flap was excised through the skin and subcutaneous tissue down to the layer of the underlying musculature.  The pedicle flap was carefully excised within this deep plane to maintain its blood supply.  The edges of the donor site were undermined.   The donor site was closed in a primary fashion.  The pedicle was then rotated into position and sutured.  Once the tube was sutured into place, adequate blood supply was confirmed with blanching and refill.  The pedicle was then wrapped with xeroform gauze and dressed appropriately with a telfa and gauze bandage to ensure continued blood supply and protect the attached pedicle.
Lip Wedge Excision Repair Text: Given the location of the defect and the proximity to free margins a full thickness wedge repair was deemed most appropriate.  Using a sterile surgical marker, the appropriate repair was drawn incorporating the defect and placing the expected incisions perpendicular to the vermilion border.  The vermilion border was also meticulously outlined to ensure appropriate reapproximation during the repair.  The area thus outlined was incised through and through with a #15 scalpel blade.  The muscularis and dermis were reaproximated with deep sutures following hemostasis. Care was taken to realign the vermilion border before proceeding with the superficial closure.  Once the vermilion was realigned the superfical and mucosal closure was finished.
Ftsg Text: The defect edges were debeveled with a #15 scalpel blade.  Given the location of the defect, shape of the defect and the proximity to free margins a full thickness skin graft was deemed most appropriate.  Using a sterile surgical marker, the primary defect shape was transferred to the donor site. The area thus outlined was incised deep to adipose tissue with a #15 scalpel blade.  The harvested graft was then trimmed of adipose tissue until only dermis and epidermis was left.  The skin margins of the secondary defect were undermined to an appropriate distance in all directions utilizing iris scissors.  The secondary defect was closed with interrupted buried subcutaneous sutures.  The skin edges were then re-apposed with running  sutures.  The skin graft was then placed in the primary defect and oriented appropriately.
Split-Thickness Skin Graft Text: The defect edges were debeveled with a #15 scalpel blade.  Given the location of the defect, shape of the defect and the proximity to free margins a split thickness skin graft was deemed most appropriate.  Using a sterile surgical marker, the primary defect shape was transferred to the donor site. The split thickness graft was then harvested.  The skin graft was then placed in the primary defect and oriented appropriately.
Burow's Graft Text: The defect edges were debeveled with a #15 scalpel blade.  Given the location of the defect, shape of the defect, the proximity to free margins and the presence of a standing cone deformity a Burow's skin graft was deemed most appropriate. The standing cone was removed and this tissue was then trimmed to the shape of the primary defect. The adipose tissue was also removed until only dermis and epidermis were left.  The skin margins of the secondary defect were undermined to an appropriate distance in all directions utilizing iris scissors.  The secondary defect was closed with interrupted buried subcutaneous sutures.  The skin edges were then re-apposed with running  sutures.  The skin graft was then placed in the primary defect and oriented appropriately.
Cartilage Graft Text: The defect edges were debeveled with a #15 scalpel blade.  Given the location of the defect, shape of the defect, the fact the defect involved a full thickness cartilage defect a cartilage graft was deemed most appropriate.  An appropriate donor site was identified, cleansed, and anesthetized. The cartilage graft was then harvested and transferred to the recipient site, oriented appropriately and then sutured into place.  The secondary defect was then repaired using a primary closure.
Composite Graft Text: The defect edges were debeveled with a #15 scalpel blade.  Given the location of the defect, shape of the defect, the proximity to free margins and the fact the defect was full thickness a composite graft was deemed most appropriate.  The defect was outline and then transferred to the donor site.  A full thickness graft was then excised from the donor site. The graft was then placed in the primary defect, oriented appropriately and then sutured into place.  The secondary defect was then repaired using a primary closure.
Epidermal Autograft Text: The defect edges were debeveled with a #15 scalpel blade.  Given the location of the defect, shape of the defect and the proximity to free margins an epidermal autograft was deemed most appropriate.  Using a sterile surgical marker, the primary defect shape was transferred to the donor site. The epidermal graft was then harvested.  The skin graft was then placed in the primary defect and oriented appropriately.
Dermal Autograft Text: The defect edges were debeveled with a #15 scalpel blade.  Given the location of the defect, shape of the defect and the proximity to free margins a dermal autograft was deemed most appropriate.  Using a sterile surgical marker, the primary defect shape was transferred to the donor site. The area thus outlined was incised deep to adipose tissue with a #15 scalpel blade.  The harvested graft was then trimmed of adipose and epidermal tissue until only dermis was left.  The skin graft was then placed in the primary defect and oriented appropriately.
Skin Substitute Text: The defect edges were debeveled with a #15 scalpel blade.  Given the location of the defect, shape of the defect and the proximity to free margins a skin substitute graft was deemed most appropriate.  The graft material was trimmed to fit the size of the defect. The graft was then placed in the primary defect and oriented appropriately.
Tissue Cultured Epidermal Autograft Text: The defect edges were debeveled with a #15 scalpel blade.  Given the location of the defect, shape of the defect and the proximity to free margins a tissue cultured epidermal autograft was deemed most appropriate.  The graft was then trimmed to fit the size of the defect.  The graft was then placed in the primary defect and oriented appropriately.
Xenograft Text: The defect edges were debeveled with a #15 scalpel blade.  Given the location of the defect, shape of the defect and the proximity to free margins a xenograft was deemed most appropriate.  The graft was then trimmed to fit the size of the defect.  The graft was then placed in the primary defect and oriented appropriately.
Purse String (Intermediate) Text: Given the location of the defect and the characteristics of the surrounding skin a purse string intermediate closure was deemed most appropriate.  Undermining was performed circumferentially around the surgical defect.  A purse string suture was then placed and tightened.
Purse String (Simple) Text: Given the location of the defect and the characteristics of the surrounding skin a purse string simple closure was deemed most appropriate.  Undermining was performed circumferentially around the surgical defect.  A purse string suture was then placed and tightened.
Complex Repair And Single Advancement Flap Text: The defect edges were debeveled with a #15 scalpel blade.  The primary defect was closed partially with a complex linear closure.  Given the location of the remaining defect, shape of the defect and the proximity to free margins a single advancement flap was deemed most appropriate for complete closure of the defect.  Using a sterile surgical marker, an appropriate advancement flap was drawn incorporating the defect and placing the expected incisions within the relaxed skin tension lines where possible.    The area thus outlined was incised deep to adipose tissue with a #15 scalpel blade.  The skin margins were undermined to an appropriate distance in all directions utilizing iris scissors.
Complex Repair And Double Advancement Flap Text: The defect edges were debeveled with a #15 scalpel blade.  The primary defect was closed partially with a complex linear closure.  Given the location of the remaining defect, shape of the defect and the proximity to free margins a double advancement flap was deemed most appropriate for complete closure of the defect.  Using a sterile surgical marker, an appropriate advancement flap was drawn incorporating the defect and placing the expected incisions within the relaxed skin tension lines where possible.    The area thus outlined was incised deep to adipose tissue with a #15 scalpel blade.  The skin margins were undermined to an appropriate distance in all directions utilizing iris scissors.
Complex Repair And Modified Advancement Flap Text: The defect edges were debeveled with a #15 scalpel blade.  The primary defect was closed partially with a complex linear closure.  Given the location of the remaining defect, shape of the defect and the proximity to free margins a modified advancement flap was deemed most appropriate for complete closure of the defect.  Using a sterile surgical marker, an appropriate advancement flap was drawn incorporating the defect and placing the expected incisions within the relaxed skin tension lines where possible.    The area thus outlined was incised deep to adipose tissue with a #15 scalpel blade.  The skin margins were undermined to an appropriate distance in all directions utilizing iris scissors.
Complex Repair And A-T Advancement Flap Text: The defect edges were debeveled with a #15 scalpel blade.  The primary defect was closed partially with a complex linear closure.  Given the location of the remaining defect, shape of the defect and the proximity to free margins an A-T advancement flap was deemed most appropriate for complete closure of the defect.  Using a sterile surgical marker, an appropriate advancement flap was drawn incorporating the defect and placing the expected incisions within the relaxed skin tension lines where possible.    The area thus outlined was incised deep to adipose tissue with a #15 scalpel blade.  The skin margins were undermined to an appropriate distance in all directions utilizing iris scissors.
Complex Repair And O-T Advancement Flap Text: The defect edges were debeveled with a #15 scalpel blade.  The primary defect was closed partially with a complex linear closure.  Given the location of the remaining defect, shape of the defect and the proximity to free margins an O-T advancement flap was deemed most appropriate for complete closure of the defect.  Using a sterile surgical marker, an appropriate advancement flap was drawn incorporating the defect and placing the expected incisions within the relaxed skin tension lines where possible.    The area thus outlined was incised deep to adipose tissue with a #15 scalpel blade.  The skin margins were undermined to an appropriate distance in all directions utilizing iris scissors.
Complex Repair And O-L Flap Text: The defect edges were debeveled with a #15 scalpel blade.  The primary defect was closed partially with a complex linear closure.  Given the location of the remaining defect, shape of the defect and the proximity to free margins an O-L flap was deemed most appropriate for complete closure of the defect.  Using a sterile surgical marker, an appropriate flap was drawn incorporating the defect and placing the expected incisions within the relaxed skin tension lines where possible.    The area thus outlined was incised deep to adipose tissue with a #15 scalpel blade.  The skin margins were undermined to an appropriate distance in all directions utilizing iris scissors.
Complex Repair And Bilobe Flap Text: The defect edges were debeveled with a #15 scalpel blade.  The primary defect was closed partially with a complex linear closure.  Given the location of the remaining defect, shape of the defect and the proximity to free margins a bilobe flap was deemed most appropriate for complete closure of the defect.  Using a sterile surgical marker, an appropriate advancement flap was drawn incorporating the defect and placing the expected incisions within the relaxed skin tension lines where possible.    The area thus outlined was incised deep to adipose tissue with a #15 scalpel blade.  The skin margins were undermined to an appropriate distance in all directions utilizing iris scissors.
Complex Repair And Melolabial Flap Text: The defect edges were debeveled with a #15 scalpel blade.  The primary defect was closed partially with a complex linear closure.  Given the location of the remaining defect, shape of the defect and the proximity to free margins a melolabial flap was deemed most appropriate for complete closure of the defect.  Using a sterile surgical marker, an appropriate advancement flap was drawn incorporating the defect and placing the expected incisions within the relaxed skin tension lines where possible.    The area thus outlined was incised deep to adipose tissue with a #15 scalpel blade.  The skin margins were undermined to an appropriate distance in all directions utilizing iris scissors.
Complex Repair And Rotation Flap Text: The defect edges were debeveled with a #15 scalpel blade.  The primary defect was closed partially with a complex linear closure.  Given the location of the remaining defect, shape of the defect and the proximity to free margins a rotation flap was deemed most appropriate for complete closure of the defect.  Using a sterile surgical marker, an appropriate advancement flap was drawn incorporating the defect and placing the expected incisions within the relaxed skin tension lines where possible.    The area thus outlined was incised deep to adipose tissue with a #15 scalpel blade.  The skin margins were undermined to an appropriate distance in all directions utilizing iris scissors.
Complex Repair And Rhombic Flap Text: The defect edges were debeveled with a #15 scalpel blade.  The primary defect was closed partially with a complex linear closure.  Given the location of the remaining defect, shape of the defect and the proximity to free margins a rhombic flap was deemed most appropriate for complete closure of the defect.  Using a sterile surgical marker, an appropriate advancement flap was drawn incorporating the defect and placing the expected incisions within the relaxed skin tension lines where possible.    The area thus outlined was incised deep to adipose tissue with a #15 scalpel blade.  The skin margins were undermined to an appropriate distance in all directions utilizing iris scissors.
Complex Repair And Transposition Flap Text: The defect edges were debeveled with a #15 scalpel blade.  The primary defect was closed partially with a complex linear closure.  Given the location of the remaining defect, shape of the defect and the proximity to free margins a transposition flap was deemed most appropriate for complete closure of the defect.  Using a sterile surgical marker, an appropriate advancement flap was drawn incorporating the defect and placing the expected incisions within the relaxed skin tension lines where possible.    The area thus outlined was incised deep to adipose tissue with a #15 scalpel blade.  The skin margins were undermined to an appropriate distance in all directions utilizing iris scissors.
Complex Repair And V-Y Plasty Text: The defect edges were debeveled with a #15 scalpel blade.  The primary defect was closed partially with a complex linear closure.  Given the location of the remaining defect, shape of the defect and the proximity to free margins a V-Y plasty was deemed most appropriate for complete closure of the defect.  Using a sterile surgical marker, an appropriate advancement flap was drawn incorporating the defect and placing the expected incisions within the relaxed skin tension lines where possible.    The area thus outlined was incised deep to adipose tissue with a #15 scalpel blade.  The skin margins were undermined to an appropriate distance in all directions utilizing iris scissors.
Complex Repair And M Plasty Text: The defect edges were debeveled with a #15 scalpel blade.  The primary defect was closed partially with a complex linear closure.  Given the location of the remaining defect, shape of the defect and the proximity to free margins an M plasty was deemed most appropriate for complete closure of the defect.  Using a sterile surgical marker, an appropriate advancement flap was drawn incorporating the defect and placing the expected incisions within the relaxed skin tension lines where possible.    The area thus outlined was incised deep to adipose tissue with a #15 scalpel blade.  The skin margins were undermined to an appropriate distance in all directions utilizing iris scissors.
Complex Repair And Double M Plasty Text: The defect edges were debeveled with a #15 scalpel blade.  The primary defect was closed partially with a complex linear closure.  Given the location of the remaining defect, shape of the defect and the proximity to free margins a double M plasty was deemed most appropriate for complete closure of the defect.  Using a sterile surgical marker, an appropriate advancement flap was drawn incorporating the defect and placing the expected incisions within the relaxed skin tension lines where possible.    The area thus outlined was incised deep to adipose tissue with a #15 scalpel blade.  The skin margins were undermined to an appropriate distance in all directions utilizing iris scissors.
Complex Repair And W Plasty Text: The defect edges were debeveled with a #15 scalpel blade.  The primary defect was closed partially with a complex linear closure.  Given the location of the remaining defect, shape of the defect and the proximity to free margins a W plasty was deemed most appropriate for complete closure of the defect.  Using a sterile surgical marker, an appropriate advancement flap was drawn incorporating the defect and placing the expected incisions within the relaxed skin tension lines where possible.    The area thus outlined was incised deep to adipose tissue with a #15 scalpel blade.  The skin margins were undermined to an appropriate distance in all directions utilizing iris scissors.
Complex Repair And Z Plasty Text: The defect edges were debeveled with a #15 scalpel blade.  The primary defect was closed partially with a complex linear closure.  Given the location of the remaining defect, shape of the defect and the proximity to free margins a Z plasty was deemed most appropriate for complete closure of the defect.  Using a sterile surgical marker, an appropriate advancement flap was drawn incorporating the defect and placing the expected incisions within the relaxed skin tension lines where possible.    The area thus outlined was incised deep to adipose tissue with a #15 scalpel blade.  The skin margins were undermined to an appropriate distance in all directions utilizing iris scissors.
Complex Repair And Dorsal Nasal Flap Text: The defect edges were debeveled with a #15 scalpel blade.  The primary defect was closed partially with a complex linear closure.  Given the location of the remaining defect, shape of the defect and the proximity to free margins a dorsal nasal flap was deemed most appropriate for complete closure of the defect.  Using a sterile surgical marker, an appropriate flap was drawn incorporating the defect and placing the expected incisions within the relaxed skin tension lines where possible.    The area thus outlined was incised deep to adipose tissue with a #15 scalpel blade.  The skin margins were undermined to an appropriate distance in all directions utilizing iris scissors.
Complex Repair And Ftsg Text: The defect edges were debeveled with a #15 scalpel blade.  The primary defect was closed partially with a complex linear closure.  Given the location of the defect, shape of the defect and the proximity to free margins a full thickness skin graft was deemed most appropriate to repair the remaining defect.  The graft was trimmed to fit the size of the remaining defect.  The graft was then placed in the primary defect, oriented appropriately, and sutured into place.
Complex Repair And Burow's Graft Text: The defect edges were debeveled with a #15 scalpel blade.  The primary defect was closed partially with a complex linear closure.  Given the location of the defect, shape of the defect, the proximity to free margins and the presence of a standing cone deformity a Burow's graft was deemed most appropriate to repair the remaining defect.  The graft was trimmed to fit the size of the remaining defect.  The graft was then placed in the primary defect, oriented appropriately, and sutured into place.
Complex Repair And Split-Thickness Skin Graft Text: The defect edges were debeveled with a #15 scalpel blade.  The primary defect was closed partially with a complex linear closure.  Given the location of the defect, shape of the defect and the proximity to free margins a split thickness skin graft was deemed most appropriate to repair the remaining defect.  The graft was trimmed to fit the size of the remaining defect.  The graft was then placed in the primary defect, oriented appropriately, and sutured into place.
Complex Repair And Epidermal Autograft Text: The defect edges were debeveled with a #15 scalpel blade.  The primary defect was closed partially with a complex linear closure.  Given the location of the defect, shape of the defect and the proximity to free margins an epidermal autograft was deemed most appropriate to repair the remaining defect.  The graft was trimmed to fit the size of the remaining defect.  The graft was then placed in the primary defect, oriented appropriately, and sutured into place.
Complex Repair And Dermal Autograft Text: The defect edges were debeveled with a #15 scalpel blade.  The primary defect was closed partially with a complex linear closure.  Given the location of the defect, shape of the defect and the proximity to free margins an dermal autograft was deemed most appropriate to repair the remaining defect.  The graft was trimmed to fit the size of the remaining defect.  The graft was then placed in the primary defect, oriented appropriately, and sutured into place.
Complex Repair And Tissue Cultured Epidermal Autograft Text: The defect edges were debeveled with a #15 scalpel blade.  The primary defect was closed partially with a complex linear closure.  Given the location of the defect, shape of the defect and the proximity to free margins an tissue cultured epidermal autograft was deemed most appropriate to repair the remaining defect.  The graft was trimmed to fit the size of the remaining defect.  The graft was then placed in the primary defect, oriented appropriately, and sutured into place.
Complex Repair And Xenograft Text: The defect edges were debeveled with a #15 scalpel blade.  The primary defect was closed partially with a complex linear closure.  Given the location of the defect, shape of the defect and the proximity to free margins a xenograft was deemed most appropriate to repair the remaining defect.  The graft was trimmed to fit the size of the remaining defect.  The graft was then placed in the primary defect, oriented appropriately, and sutured into place.
Complex Repair And Skin Substitute Graft Text: The defect edges were debeveled with a #15 scalpel blade.  The primary defect was closed partially with a complex linear closure.  Given the location of the remaining defect, shape of the defect and the proximity to free margins a skin substitute graft was deemed most appropriate to repair the remaining defect.  The graft was trimmed to fit the size of the remaining defect.  The graft was then placed in the primary defect, oriented appropriately, and sutured into place.
Path Notes (To The Dermatopathologist): Please check margins.
Consent was obtained from the patient. The risks and benefits to therapy were discussed in detail. Specifically, the risks of infection, scarring, bleeding, prolonged wound healing, incomplete removal, allergy to anesthesia, nerve injury and recurrence were addressed. Prior to the procedure, the treatment site was clearly identified and confirmed by the patient. All components of Universal Protocol/PAUSE Rule completed.
Render Post-Care Instructions In Note?: yes
Post-Care Instructions: I reviewed with the patient in detail post-care instructions. Patient is not to engage in any heavy lifting, exercise, or swimming for the next 14 days. Should the patient develop any fevers, chills, bleeding, severe pain patient will contact the office immediately.
Home Suture Removal Text: Patient was provided a home suture removal kit and will remove their sutures at home.  If they have any questions or difficulties they will call the office.
Where Do You Want The Question To Include Opioid Counseling Located?: Case Summary Tab
Billing Type: Third-Party Bill
Information: Selecting Yes will display possible errors in your note based on the variables you have selected. This validation is only offered as a suggestion for you. PLEASE NOTE THAT THE VALIDATION TEXT WILL BE REMOVED WHEN YOU FINALIZE YOUR NOTE. IF YOU WANT TO FAX A PRELIMINARY NOTE YOU WILL NEED TO TOGGLE THIS TO 'NO' IF YOU DO NOT WANT IT IN YOUR FAXED NOTE.

## 2020-12-22 ENCOUNTER — HOSPITAL ENCOUNTER (OUTPATIENT)
Dept: RADIOLOGY | Facility: MEDICAL CENTER | Age: 70
End: 2020-12-22
Attending: SURGERY
Payer: MEDICARE

## 2020-12-22 DIAGNOSIS — N63.20 LUMP OF LEFT BREAST: ICD-10-CM

## 2020-12-22 PROCEDURE — 76642 ULTRASOUND BREAST LIMITED: CPT | Mod: LT

## 2021-01-15 DIAGNOSIS — Z23 NEED FOR VACCINATION: ICD-10-CM

## 2021-02-02 ENCOUNTER — APPOINTMENT (RX ONLY)
Dept: URBAN - METROPOLITAN AREA CLINIC 4 | Facility: CLINIC | Age: 71
Setting detail: DERMATOLOGY
End: 2021-02-02

## 2021-02-02 DIAGNOSIS — L57.0 ACTINIC KERATOSIS: ICD-10-CM

## 2021-02-02 DIAGNOSIS — L82.1 OTHER SEBORRHEIC KERATOSIS: ICD-10-CM

## 2021-02-02 DIAGNOSIS — L81.4 OTHER MELANIN HYPERPIGMENTATION: ICD-10-CM

## 2021-02-02 DIAGNOSIS — L91.8 OTHER HYPERTROPHIC DISORDERS OF THE SKIN: ICD-10-CM

## 2021-02-02 DIAGNOSIS — Z71.89 OTHER SPECIFIED COUNSELING: ICD-10-CM

## 2021-02-02 DIAGNOSIS — D18.0 HEMANGIOMA: ICD-10-CM

## 2021-02-02 DIAGNOSIS — D22 MELANOCYTIC NEVI: ICD-10-CM

## 2021-02-02 PROBLEM — D48.5 NEOPLASM OF UNCERTAIN BEHAVIOR OF SKIN: Status: ACTIVE | Noted: 2021-02-02

## 2021-02-02 PROBLEM — D22.5 MELANOCYTIC NEVI OF TRUNK: Status: ACTIVE | Noted: 2021-02-02

## 2021-02-02 PROBLEM — D18.01 HEMANGIOMA OF SKIN AND SUBCUTANEOUS TISSUE: Status: ACTIVE | Noted: 2021-02-02

## 2021-02-02 PROCEDURE — ? SUNSCREEN RECOMMENDATIONS

## 2021-02-02 PROCEDURE — ? COUNSELING

## 2021-02-02 PROCEDURE — 17000 DESTRUCT PREMALG LESION: CPT | Mod: 59

## 2021-02-02 PROCEDURE — 17110 DESTRUCTION B9 LES UP TO 14: CPT

## 2021-02-02 PROCEDURE — 99213 OFFICE O/P EST LOW 20 MIN: CPT | Mod: 25

## 2021-02-02 PROCEDURE — 17003 DESTRUCT PREMALG LES 2-14: CPT | Mod: 59

## 2021-02-02 PROCEDURE — ? BENIGN DESTRUCTION

## 2021-02-02 PROCEDURE — ? LIQUID NITROGEN

## 2021-02-02 PROCEDURE — ? SUNSCREEN TREATMENT REGIMEN

## 2021-02-02 ASSESSMENT — LOCATION SIMPLE DESCRIPTION DERM
LOCATION SIMPLE: RIGHT HAND
LOCATION SIMPLE: LEFT CHEEK
LOCATION SIMPLE: LEFT FOREHEAD
LOCATION SIMPLE: ABDOMEN
LOCATION SIMPLE: SUPERIOR FOREHEAD
LOCATION SIMPLE: RIGHT FOREHEAD
LOCATION SIMPLE: GROIN
LOCATION SIMPLE: RIGHT BREAST
LOCATION SIMPLE: CHEST
LOCATION SIMPLE: LEFT HAND

## 2021-02-02 ASSESSMENT — LOCATION ZONE DERM
LOCATION ZONE: TRUNK
LOCATION ZONE: HAND
LOCATION ZONE: FACE

## 2021-02-02 ASSESSMENT — LOCATION DETAILED DESCRIPTION DERM
LOCATION DETAILED: RIGHT SUPRAPUBIC SKIN
LOCATION DETAILED: RIGHT LATERAL FOREHEAD
LOCATION DETAILED: LEFT CENTRAL MALAR CHEEK
LOCATION DETAILED: LEFT ULNAR DORSAL HAND
LOCATION DETAILED: RIGHT MEDIAL SUPERIOR CHEST
LOCATION DETAILED: LEFT INFERIOR MEDIAL MALAR CHEEK
LOCATION DETAILED: SUPERIOR MID FOREHEAD
LOCATION DETAILED: RIGHT MEDIAL BREAST 4-5:00 REGION
LOCATION DETAILED: EPIGASTRIC SKIN
LOCATION DETAILED: LEFT SUPERIOR FOREHEAD
LOCATION DETAILED: RIGHT RADIAL DORSAL HAND
LOCATION DETAILED: PERIUMBILICAL SKIN
LOCATION DETAILED: RIGHT SUPERIOR FOREHEAD

## 2021-02-02 NOTE — PROCEDURE: BENIGN DESTRUCTION
Include Z78.9 (Other Specified Conditions Influencing Health Status) As An Associated Diagnosis?: No
Anesthesia Volume In Cc: 0.5
Medical Necessity Clause: This procedure was medically necessary because the lesion was treated was: very irritated by shirt collar
Bill Insurance (You Assume Risk Of Denial Or Audit By Selecting Yes): Yes
Post-Care Instructions: I reviewed with the patient in detail post-care instructions. Patient is to wear sunprotection, and avoid picking at any of the treated lesions. Pt may apply Vaseline to crusted or scabbing areas.
Detail Level: Detailed
Consent: The patient's consent was obtained including but not limited to risks of crusting, scabbing, blistering, scarring, darker or lighter pigmentary change, recurrence, incomplete removal and infection.
Medical Necessity Information: It is in your best interest to select a reason for this procedure from the list below. All of these items fulfill various CMS LCD requirements except the new and changing color options.

## 2021-02-26 ENCOUNTER — PATIENT OUTREACH (OUTPATIENT)
Dept: OTHER | Facility: MEDICAL CENTER | Age: 71
End: 2021-02-26

## 2021-02-26 NOTE — PROGRESS NOTES
SCP mailed.  Phoned to review cancer treatment summary/ survivorship care plan.  No answer, left message to call back.

## 2021-04-01 ENCOUNTER — HOSPITAL ENCOUNTER (OUTPATIENT)
Dept: RADIOLOGY | Facility: MEDICAL CENTER | Age: 71
End: 2021-04-01
Attending: FAMILY MEDICINE
Payer: MEDICARE

## 2021-04-01 DIAGNOSIS — R22.1 NECK MASS: ICD-10-CM

## 2021-04-01 PROCEDURE — 76536 US EXAM OF HEAD AND NECK: CPT

## 2021-04-06 ENCOUNTER — HOSPITAL ENCOUNTER (OUTPATIENT)
Dept: LAB | Facility: MEDICAL CENTER | Age: 71
End: 2021-04-06
Attending: FAMILY MEDICINE
Payer: MEDICARE

## 2021-04-06 LAB
ALBUMIN SERPL BCP-MCNC: 4.2 G/DL (ref 3.2–4.9)
ALBUMIN/GLOB SERPL: 1.6 G/DL
ALP SERPL-CCNC: 82 U/L (ref 30–99)
ALT SERPL-CCNC: 23 U/L (ref 2–50)
ANION GAP SERPL CALC-SCNC: 11 MMOL/L (ref 7–16)
AST SERPL-CCNC: 28 U/L (ref 12–45)
BASOPHILS # BLD AUTO: 0.6 % (ref 0–1.8)
BASOPHILS # BLD: 0.03 K/UL (ref 0–0.12)
BILIRUB SERPL-MCNC: 1.5 MG/DL (ref 0.1–1.5)
BUN SERPL-MCNC: 15 MG/DL (ref 8–22)
CALCIUM SERPL-MCNC: 9.4 MG/DL (ref 8.5–10.5)
CHLORIDE SERPL-SCNC: 108 MMOL/L (ref 96–112)
CHOLEST SERPL-MCNC: 176 MG/DL (ref 100–199)
CO2 SERPL-SCNC: 25 MMOL/L (ref 20–33)
CREAT SERPL-MCNC: 0.69 MG/DL (ref 0.5–1.4)
EOSINOPHIL # BLD AUTO: 0.13 K/UL (ref 0–0.51)
EOSINOPHIL NFR BLD: 2.8 % (ref 0–6.9)
ERYTHROCYTE [DISTWIDTH] IN BLOOD BY AUTOMATED COUNT: 48.8 FL (ref 35.9–50)
GLOBULIN SER CALC-MCNC: 2.6 G/DL (ref 1.9–3.5)
GLUCOSE SERPL-MCNC: 104 MG/DL (ref 65–99)
HCT VFR BLD AUTO: 46.3 % (ref 37–47)
HDLC SERPL-MCNC: 45 MG/DL
HGB BLD-MCNC: 15.5 G/DL (ref 12–16)
IMM GRANULOCYTES # BLD AUTO: 0.01 K/UL (ref 0–0.11)
IMM GRANULOCYTES NFR BLD AUTO: 0.2 % (ref 0–0.9)
LDLC SERPL CALC-MCNC: 104 MG/DL
LYMPHOCYTES # BLD AUTO: 1.15 K/UL (ref 1–4.8)
LYMPHOCYTES NFR BLD: 24.5 % (ref 22–41)
MCH RBC QN AUTO: 33.4 PG (ref 27–33)
MCHC RBC AUTO-ENTMCNC: 33.5 G/DL (ref 33.6–35)
MCV RBC AUTO: 99.8 FL (ref 81.4–97.8)
MONOCYTES # BLD AUTO: 0.4 K/UL (ref 0–0.85)
MONOCYTES NFR BLD AUTO: 8.5 % (ref 0–13.4)
NEUTROPHILS # BLD AUTO: 2.97 K/UL (ref 2–7.15)
NEUTROPHILS NFR BLD: 63.4 % (ref 44–72)
NRBC # BLD AUTO: 0 K/UL
NRBC BLD-RTO: 0 /100 WBC
PLATELET # BLD AUTO: 198 K/UL (ref 164–446)
PMV BLD AUTO: 10.5 FL (ref 9–12.9)
POTASSIUM SERPL-SCNC: 4 MMOL/L (ref 3.6–5.5)
PROT SERPL-MCNC: 6.8 G/DL (ref 6–8.2)
RBC # BLD AUTO: 4.64 M/UL (ref 4.2–5.4)
SODIUM SERPL-SCNC: 144 MMOL/L (ref 135–145)
TRIGL SERPL-MCNC: 133 MG/DL (ref 0–149)
WBC # BLD AUTO: 4.7 K/UL (ref 4.8–10.8)

## 2021-04-06 PROCEDURE — 80053 COMPREHEN METABOLIC PANEL: CPT

## 2021-04-06 PROCEDURE — 84443 ASSAY THYROID STIM HORMONE: CPT

## 2021-04-06 PROCEDURE — 36415 COLL VENOUS BLD VENIPUNCTURE: CPT

## 2021-04-06 PROCEDURE — 82306 VITAMIN D 25 HYDROXY: CPT

## 2021-04-06 PROCEDURE — 85025 COMPLETE CBC W/AUTO DIFF WBC: CPT

## 2021-04-06 PROCEDURE — 83036 HEMOGLOBIN GLYCOSYLATED A1C: CPT | Mod: GA

## 2021-04-06 PROCEDURE — 80061 LIPID PANEL: CPT

## 2021-04-07 LAB
EST. AVERAGE GLUCOSE BLD GHB EST-MCNC: 114 MG/DL
HBA1C MFR BLD: 5.6 % (ref 4–5.6)

## 2021-04-08 LAB
25(OH)D3 SERPL-MCNC: 48 NG/ML (ref 30–80)
TSH SERPL DL<=0.005 MIU/L-ACNC: 0.58 UIU/ML (ref 0.38–5.33)

## 2021-04-13 ENCOUNTER — PATIENT OUTREACH (OUTPATIENT)
Dept: OTHER | Facility: MEDICAL CENTER | Age: 71
End: 2021-04-13

## 2021-04-13 ENCOUNTER — PATIENT MESSAGE (OUTPATIENT)
Dept: HEALTH INFORMATION MANAGEMENT | Facility: OTHER | Age: 71
End: 2021-04-13

## 2021-04-13 NOTE — PROGRESS NOTES
Second attempt to review cancer treatment summary / SCP.  Sent message and treatment summary via My Chart with request for call back.

## 2021-04-15 ENCOUNTER — HOSPITAL ENCOUNTER (OUTPATIENT)
Dept: RADIOLOGY | Facility: MEDICAL CENTER | Age: 71
End: 2021-04-15
Attending: FAMILY MEDICINE
Payer: MEDICARE

## 2021-04-15 DIAGNOSIS — E04.1 THYROID NODULE: ICD-10-CM

## 2021-04-15 LAB — CYTOLOGY REG CYTOL: NORMAL

## 2021-04-15 PROCEDURE — 88112 CYTOPATH CELL ENHANCE TECH: CPT

## 2021-04-15 PROCEDURE — 10005 FNA BX W/US GDN 1ST LES: CPT

## 2021-04-22 ENCOUNTER — PATIENT OUTREACH (OUTPATIENT)
Dept: OTHER | Facility: MEDICAL CENTER | Age: 71
End: 2021-04-22

## 2021-04-22 ENCOUNTER — PATIENT MESSAGE (OUTPATIENT)
Dept: HEALTH INFORMATION MANAGEMENT | Facility: OTHER | Age: 71
End: 2021-04-22

## 2021-04-22 NOTE — PROGRESS NOTES
Met with pt to review summary.  Pt states she started taking her Anastrozole in August of 2020.  Pt reports joint pain and hot flashes.  Denies vaginal dryness.  She was seen for physical therapy to address range of motion with positive results.  She states she does her exercises daily while wearing her compression garment.  She has been having some inguinal pain for which she is scheduled for a follow up MRI on both knees.  She enjoys walking but this pain is impacting her activity.  Pt states she is trying to focus on eating healthy.  Provided information on [place] scholarship through [employer].  Also provided information on how to register for [facility] program.  She underwent a biopsy of a thyroid nodule which was found to be benign.  Discussed survivorship resources including Mind Body program through [email or web address] as well as [facility] support [facility].  Pt recently lost a close friend and has been fulfilling the role of executor.  Discussed grief support including counseling.     Encouraged pt to contact navigator with any questions that may arise.  Treatment Summary amended to reflect family history of colon cancer in her mother and to change the start date of anastrozole to August of 2020.

## 2021-05-03 ENCOUNTER — HOSPITAL ENCOUNTER (OUTPATIENT)
Dept: RADIOLOGY | Facility: MEDICAL CENTER | Age: 71
End: 2021-05-03
Attending: SURGERY
Payer: MEDICARE

## 2021-05-03 DIAGNOSIS — Z85.3 PERSONAL HISTORY OF MALIGNANT NEOPLASM OF BREAST: ICD-10-CM

## 2021-05-03 PROCEDURE — G0279 TOMOSYNTHESIS, MAMMO: HCPCS

## 2021-05-03 PROCEDURE — 76642 ULTRASOUND BREAST LIMITED: CPT | Mod: LT

## 2021-05-04 ENCOUNTER — HOSPITAL ENCOUNTER (OUTPATIENT)
Dept: RADIOLOGY | Facility: MEDICAL CENTER | Age: 71
End: 2021-05-04
Attending: FAMILY MEDICINE
Payer: MEDICARE

## 2021-05-04 DIAGNOSIS — M17.9 OSTEOARTHRITIS OF KNEE, UNSPECIFIED: ICD-10-CM

## 2021-05-04 PROCEDURE — 73721 MRI JNT OF LWR EXTRE W/O DYE: CPT | Mod: RT,MH

## 2021-05-04 PROCEDURE — 73721 MRI JNT OF LWR EXTRE W/O DYE: CPT | Mod: LT,MH

## 2021-05-11 ENCOUNTER — HOSPITAL ENCOUNTER (OUTPATIENT)
Dept: RADIOLOGY | Facility: MEDICAL CENTER | Age: 71
End: 2021-05-11
Attending: FAMILY MEDICINE
Payer: MEDICARE

## 2021-05-11 DIAGNOSIS — R10.30 INGUINAL PAIN, UNSPECIFIED LATERALITY: ICD-10-CM

## 2021-05-11 DIAGNOSIS — R10.32 LEFT INGUINAL PAIN: ICD-10-CM

## 2021-05-11 PROCEDURE — 73521 X-RAY EXAM HIPS BI 2 VIEWS: CPT

## 2021-05-29 ENCOUNTER — HOSPITAL ENCOUNTER (OUTPATIENT)
Dept: RADIOLOGY | Facility: MEDICAL CENTER | Age: 71
End: 2021-05-29
Attending: FAMILY MEDICINE
Payer: MEDICARE

## 2021-05-29 DIAGNOSIS — R22.30 SHOULDER MASS: ICD-10-CM

## 2021-05-29 PROCEDURE — 700117 HCHG RX CONTRAST REV CODE 255: Performed by: FAMILY MEDICINE

## 2021-05-29 PROCEDURE — 71260 CT THORAX DX C+: CPT | Mod: MH

## 2021-05-29 RX ADMIN — IOHEXOL 80 ML: 350 INJECTION, SOLUTION INTRAVENOUS at 09:20

## 2021-07-01 ENCOUNTER — HOSPITAL ENCOUNTER (OUTPATIENT)
Dept: LAB | Facility: MEDICAL CENTER | Age: 71
End: 2021-07-01
Attending: FAMILY MEDICINE
Payer: MEDICARE

## 2021-07-01 LAB
ALBUMIN SERPL BCP-MCNC: 4.1 G/DL (ref 3.2–4.9)
ALBUMIN/GLOB SERPL: 1.6 G/DL
ALP SERPL-CCNC: 88 U/L (ref 30–99)
ALT SERPL-CCNC: 21 U/L (ref 2–50)
ANION GAP SERPL CALC-SCNC: 12 MMOL/L (ref 7–16)
AST SERPL-CCNC: 24 U/L (ref 12–45)
BASOPHILS # BLD AUTO: 0.2 % (ref 0–1.8)
BASOPHILS # BLD: 0.01 K/UL (ref 0–0.12)
BILIRUB SERPL-MCNC: 1.8 MG/DL (ref 0.1–1.5)
BUN SERPL-MCNC: 14 MG/DL (ref 8–22)
CALCIUM SERPL-MCNC: 9.9 MG/DL (ref 8.5–10.5)
CHLORIDE SERPL-SCNC: 102 MMOL/L (ref 96–112)
CHOLEST SERPL-MCNC: 158 MG/DL (ref 100–199)
CO2 SERPL-SCNC: 27 MMOL/L (ref 20–33)
CREAT SERPL-MCNC: 0.62 MG/DL (ref 0.5–1.4)
EOSINOPHIL # BLD AUTO: 0.07 K/UL (ref 0–0.51)
EOSINOPHIL NFR BLD: 1.5 % (ref 0–6.9)
ERYTHROCYTE [DISTWIDTH] IN BLOOD BY AUTOMATED COUNT: 50.9 FL (ref 35.9–50)
GLOBULIN SER CALC-MCNC: 2.6 G/DL (ref 1.9–3.5)
GLUCOSE SERPL-MCNC: 88 MG/DL (ref 65–99)
HCT VFR BLD AUTO: 44.5 % (ref 37–47)
HDLC SERPL-MCNC: 40 MG/DL
HGB BLD-MCNC: 14.8 G/DL (ref 12–16)
IMM GRANULOCYTES # BLD AUTO: 0.02 K/UL (ref 0–0.11)
IMM GRANULOCYTES NFR BLD AUTO: 0.4 % (ref 0–0.9)
LDLC SERPL CALC-MCNC: 89 MG/DL
LYMPHOCYTES # BLD AUTO: 1.24 K/UL (ref 1–4.8)
LYMPHOCYTES NFR BLD: 27 % (ref 22–41)
MCH RBC QN AUTO: 33.6 PG (ref 27–33)
MCHC RBC AUTO-ENTMCNC: 33.3 G/DL (ref 33.6–35)
MCV RBC AUTO: 100.9 FL (ref 81.4–97.8)
MONOCYTES # BLD AUTO: 0.43 K/UL (ref 0–0.85)
MONOCYTES NFR BLD AUTO: 9.4 % (ref 0–13.4)
NEUTROPHILS # BLD AUTO: 2.82 K/UL (ref 2–7.15)
NEUTROPHILS NFR BLD: 61.5 % (ref 44–72)
NRBC # BLD AUTO: 0 K/UL
NRBC BLD-RTO: 0 /100 WBC
PLATELET # BLD AUTO: 192 K/UL (ref 164–446)
PMV BLD AUTO: 10.9 FL (ref 9–12.9)
POTASSIUM SERPL-SCNC: 3.7 MMOL/L (ref 3.6–5.5)
PROT SERPL-MCNC: 6.7 G/DL (ref 6–8.2)
RBC # BLD AUTO: 4.41 M/UL (ref 4.2–5.4)
SODIUM SERPL-SCNC: 141 MMOL/L (ref 135–145)
TRIGL SERPL-MCNC: 145 MG/DL (ref 0–149)
TSH SERPL DL<=0.005 MIU/L-ACNC: 0.89 UIU/ML (ref 0.38–5.33)
WBC # BLD AUTO: 4.6 K/UL (ref 4.8–10.8)

## 2021-07-01 PROCEDURE — 84443 ASSAY THYROID STIM HORMONE: CPT

## 2021-07-01 PROCEDURE — 36415 COLL VENOUS BLD VENIPUNCTURE: CPT

## 2021-07-01 PROCEDURE — 80053 COMPREHEN METABOLIC PANEL: CPT

## 2021-07-01 PROCEDURE — 85025 COMPLETE CBC W/AUTO DIFF WBC: CPT

## 2021-07-01 PROCEDURE — 80061 LIPID PANEL: CPT

## 2021-07-30 DIAGNOSIS — I10 ESSENTIAL HYPERTENSION: ICD-10-CM

## 2021-08-02 RX ORDER — METOPROLOL SUCCINATE 25 MG/1
TABLET, EXTENDED RELEASE ORAL
Qty: 90 TABLET | Refills: 0 | Status: SHIPPED | OUTPATIENT
Start: 2021-08-02 | End: 2021-08-25 | Stop reason: SDUPTHER

## 2021-08-25 DIAGNOSIS — I10 ESSENTIAL HYPERTENSION: ICD-10-CM

## 2021-08-25 RX ORDER — METOPROLOL SUCCINATE 25 MG/1
TABLET, EXTENDED RELEASE ORAL
Qty: 90 TABLET | Refills: 0 | Status: SHIPPED | OUTPATIENT
Start: 2021-08-25 | End: 2021-11-22

## 2021-09-13 PROBLEM — M16.9 OSTEOARTHRITIS OF HIP: Status: ACTIVE | Noted: 2021-09-13

## 2021-09-14 ENCOUNTER — OFFICE VISIT (OUTPATIENT)
Dept: CARDIOLOGY | Facility: MEDICAL CENTER | Age: 71
End: 2021-09-14
Payer: MEDICARE

## 2021-09-14 VITALS
WEIGHT: 198 LBS | HEIGHT: 66 IN | SYSTOLIC BLOOD PRESSURE: 132 MMHG | BODY MASS INDEX: 31.82 KG/M2 | RESPIRATION RATE: 14 BRPM | HEART RATE: 69 BPM | DIASTOLIC BLOOD PRESSURE: 76 MMHG | OXYGEN SATURATION: 96 %

## 2021-09-14 DIAGNOSIS — E78.2 MIXED HYPERLIPIDEMIA: ICD-10-CM

## 2021-09-14 DIAGNOSIS — Z86.79 HISTORY OF SINUS TACHYCARDIA: ICD-10-CM

## 2021-09-14 DIAGNOSIS — I10 ESSENTIAL HYPERTENSION: ICD-10-CM

## 2021-09-14 PROCEDURE — 99214 OFFICE O/P EST MOD 30 MIN: CPT | Performed by: INTERNAL MEDICINE

## 2021-09-14 ASSESSMENT — ENCOUNTER SYMPTOMS
PSYCHIATRIC NEGATIVE: 1
WEAKNESS: 0
FEVER: 0
NAUSEA: 0
RESPIRATORY NEGATIVE: 1
MYALGIAS: 0
NEUROLOGICAL NEGATIVE: 1
DEPRESSION: 0
EYES NEGATIVE: 1
CLAUDICATION: 0
DIZZINESS: 0
CHILLS: 0
BLURRED VISION: 0
VOMITING: 0
BRUISES/BLEEDS EASILY: 1
ABDOMINAL PAIN: 0
FOCAL WEAKNESS: 0
SHORTNESS OF BREATH: 0
DOUBLE VISION: 0
COUGH: 0
PALPITATIONS: 0
CONSTITUTIONAL NEGATIVE: 1
CARDIOVASCULAR NEGATIVE: 1
NERVOUS/ANXIOUS: 0
HEADACHES: 0
GASTROINTESTINAL NEGATIVE: 1
WEIGHT LOSS: 0
BACK PAIN: 1

## 2021-09-14 ASSESSMENT — FIBROSIS 4 INDEX: FIB4 SCORE: 1.963961012123931431

## 2021-09-14 NOTE — PROGRESS NOTES
"Chief Complaint   Patient presents with   • Tachycardia     F/V Dx: History of sinus tachycardia & Nonsustained ventricular tachycardia (HCC)   • Hypertension     F/V Dx; Essential hypertension       Subjective     Fiona Daniels is a 71 y.o. female who presents today for follow up of sinus tachycardia.    Since the patient's last visit on 07/10/20 with Lindsay Jameson, he has been doing well clinically. He denies chest pain, shortness of breath, palpitations, nausea/vomiting or diaphoresis. She is a retired nurse from [employer].     Past Medical History:   Diagnosis Date   • Anesthesia     mother  \"dead for a minute\"   • Arthritis 07/16/2020    hx Gout   • ASTHMA    • Carcinoma of upper-outer quadrant of left breast in female, estrogen receptor positive (HCC) 8/5/2020   • Hepatitis A     age 6   • Hyperlipidemia    • Hypertension    • Pain 07/16/2020    back pain     Past Surgical History:   Procedure Laterality Date   • AR REDUCTION OF BREAST Right 7/21/2020    Procedure: MAMMOPLASTY, REDUCTION-RIGHT BREAST FOR SYMMETRY;  Surgeon: Steven Schulz M.D.;  Location: [facility];  Service: General   • PARTIAL MASTECTOMY Left 7/21/2020    Procedure: MASTECTOMY, PARTIAL- WIRE LOC;  Surgeon: Shy Jean M.D.;  Location: [facility];  Service: General   • NODE BIOPSY SENTINEL Left 7/21/2020    Procedure: BIOPSY, LYMPH NODE, SENTINEL;  Surgeon: Shy Jean M.D.;  Location: [facility];  Service: General   • AXILLARY NODE DISSECTION Left 7/21/2020    Procedure: LYMPHADENECTOMY, AXILLARY- POSS;  Surgeon: Shy Jean M.D.;  Location: [facility];  Service: General   • BREAST RECONSTRUCTION Left 7/21/2020    Procedure: RECONSTRUCTION, BREAST-WITH LOCAL TISSUE REARRANGEMENT;  Surgeon: Steven Schulz M.D.;  Location: [facility];  Service: General   • OTHER  2014    sinus   • ETHMOIDECTOMY  2/14/2012    Performed by GÓMEZ VALDERRAMA at [facility]ERY [facility] " "[facility]   • NASAL POLYPECTOMY  2/14/2012    Performed by GÓMEZ VALDERRAMA at [facility]   • NASAL SEPTAL RECONSTRUCTION  2/14/2012    Performed by GÓMEZ VALDERRAMA at [facility]   • OTHER ORTHOPEDIC SURGERY      ORIF right wrist     Family History   Problem Relation Age of Onset   • Cancer Mother         Colon   • Heart Disease Father    • Heart Attack Father      Social History     Socioeconomic History   • Marital status:      Spouse name: Not on file   • Number of children: Not on file   • Years of education: Not on file   • Highest education level: Not on file   Occupational History   • Not on file   Tobacco Use   • Smoking status: Never Smoker   • Smokeless tobacco: Never Used   Vaping Use   • Vaping Use: Never used   Substance and Sexual Activity   • Alcohol use: No   • Drug use: No   • Sexual activity: Not on file   Other Topics Concern   • Not on file   Social History Narrative   • Not on file     Social Determinants of Health     Financial Resource Strain:    • Difficulty of Paying Living Expenses:    Food Insecurity:    • Worried About Running Out of Food in the Last Year:    • Ran Out of Food in the Last Year:    Transportation Needs:    • Lack of Transportation (Medical):    • Lack of Transportation (Non-Medical):    Physical Activity:    • Days of Exercise per Week:    • Minutes of Exercise per Session:    Stress:    • Feeling of Stress :    Social Connections:    • Frequency of Communication with Friends and Family:    • Frequency of Social Gatherings with Friends and Family:    • Attends Jewish Services:    • Active Member of Clubs or Organizations:    • Attends Club or Organization Meetings:    • Marital Status:    Intimate Partner Violence:    • Fear of Current or Ex-Partner:    • Emotionally Abused:    • Physically Abused:    • Sexually Abused:      Allergies   Allergen Reactions   • Aspirin Anaphylaxis   • Codeine      \"passed out\"   • " Moxifloxacin Hcl In Nacl Rash   • Robitussin Dm [Guiatuss Dm] Anaphylaxis     (Medications reviewed.)  Outpatient Encounter Medications as of 9/14/2021   Medication Sig Dispense Refill   • metoprolol SR (TOPROL XL) 25 MG TABLET SR 24 HR TAKE 1 TABLET BY MOUTH EVERY DAY. 90 Tablet 0   • anastrozole (ARIMIDEX) 1 MG Tab Take  by mouth every day. Take 1 tablet by mouth once daily     • atorvastatin (LIPITOR) 20 MG Tab      • trandolapril (MAVIK) 4 MG Tab TK 2 T PO QD  0   • hydrochlorothiazide (HYDRODIURIL) 12.5 MG tablet Take 12.5 mg by mouth every day.     • albuterol (VENTOLIN OR PROVENTIL) 108 (90 BASE) MCG/ACT AERS Inhale 2 Puffs by mouth every four hours as needed for Shortness of Breath. 1 Inhaler 3   • fluticasone-salmeterol (ADVAIR DISKUS) 100-50 MCG/DOSE AEPB Inhale 1 Puff by mouth every day.     • Multiple Vitamins-Minerals (ONE-A-DAY 50 PLUS PO) Take  by mouth every day.     • Cholecalciferol (VITAMIN D3 PO) Take  by mouth every day.     • CALCIUM PO Take  by mouth every day. Pt does not know dose       No facility-administered encounter medications on file as of 9/14/2021.     Review of Systems   Constitutional: Negative.  Negative for chills, fever, malaise/fatigue and weight loss.   HENT: Negative.  Negative for hearing loss.    Eyes: Negative.  Negative for blurred vision and double vision.   Respiratory: Negative.  Negative for cough and shortness of breath.    Cardiovascular: Negative.  Negative for chest pain, palpitations, claudication and leg swelling.   Gastrointestinal: Negative.  Negative for abdominal pain, nausea and vomiting.   Genitourinary: Negative.  Negative for dysuria and urgency.   Musculoskeletal: Positive for back pain and joint pain. Negative for myalgias.   Skin: Negative.  Negative for itching and rash.   Neurological: Negative.  Negative for dizziness, focal weakness, weakness and headaches.   Endo/Heme/Allergies: Positive for environmental allergies. Bruises/bleeds easily.  "  Psychiatric/Behavioral: Negative.  Negative for depression. The patient is not nervous/anxious.               Objective     /76 (BP Location: Left arm, Patient Position: Sitting, BP Cuff Size: Adult)   Pulse 69   Resp 14   Ht 1.676 m (5' 6\")   Wt 89.8 kg (198 lb)   SpO2 96%   BMI 31.96 kg/m²     Physical Exam  Constitutional:       Appearance: She is well-developed.   HENT:      Head: Normocephalic and atraumatic.   Neck:      Vascular: No JVD.   Cardiovascular:      Rate and Rhythm: Normal rate and regular rhythm.      Heart sounds: Normal heart sounds.   Pulmonary:      Effort: Pulmonary effort is normal.      Breath sounds: Normal breath sounds.   Abdominal:      General: Bowel sounds are normal.      Palpations: Abdomen is soft.      Comments: No hepatosplenomegaly.   Musculoskeletal:         General: Normal range of motion.   Lymphadenopathy:      Cervical: No cervical adenopathy.   Skin:     General: Skin is warm and dry.   Neurological:      Mental Status: She is alert and oriented to person, place, and time.            CARDIAC STUDIES/PROCEDURES:    CAROTID ULTRASOUND (01/10/19)\  1.  There is a mild amount of atherosclerotic plaque.  Plaque is located in carotid bulbs and proximal internal carotid arteries.  Plaque characterization:  calcific  2.  There is no evidence of carotid occlusion.  3. Vertebral arteries demonstrate antegrade flow.  4. Diameter reduction in the internal carotid arteries: less than 50%. There is no hemodynamically significant stenosis.  (study result reviewed)    ECHOCARDIOGRAM CONCLUSIONS (11/01/19)  No prior study is available for comparison.   Hyperdynamic left ventricular systolic function.  Left ventricular ejection fraction is visually estimated to be greater than 75%.  No significant valve abnormality.  Unable to estimate RVSP, normal RAP.  (study result reviewed)    EKG performed on (07/16/20) was reviewed: EKG personally interpreted shows sinus " rhythm.    HOLTER MONITOR (10/23/19)  48 Hour Holter Summary:   Sinus, normal heart rate range, no pauses.   Rare PVCs.   Rare PACs.   One 14 beat ventricular run, rate 129 bpm, occurred at 2:25 AM.   No symptoms reported.   (study result reviewed)    Laboratory results of (07/01/21) were reviewed. Cholesterol profile of 158/145/40/89 mg/dL noted.    PET SCAN (12/04/19)  ELECTROCARDIOGRAPHIC FINDINGS:    Normal sinus rhythm.  Normal ECG response to dipyridamole infusion.  No ischemic changes noted   SCINTOGRAPHIC FINDINGS:    Normal left ventricular perfusion with stress and rest images.    There is no evidence of ischemia or scar.   However TID (transient ischemic dialatation) is noted (ratio is 2.0), can be assosiated with balance three vessel disease, correlate clinically.   GATED WALL MOTION FINDINGS:    The left ventricle wall motion is normal with stress and rest  imagings.  Measured resting ejection fraction is 79 %.   stress EF 89%.    (study result reviewed)    Assessment & Plan     1. History of sinus tachycardia     2. Essential hypertension     3. Mixed hyperlipidemia         Medical Decision Making: Today's Assessment/Status/Plan:        1. Sinus tachycardia Palpitations: She is doing well without recurrence of her palpitations on .beta blockade therapy.  2. Hypertension: Blood pressure is well controlled. We will continue with metoprolol and trandolapril.  3. Hyperlipidemia: She is doing well on statin therapy without myalgia symptoms. She is working on diet modification and exercise. We will repeat labs including fasting lipid profile in 3 months.    Greater than 45 minutes of time was spent to review all above information; of which more than 50% of the time was face to face to review all of her studies and medications.    We will follow up in 3 months with labs.    CC Nba Gonzalez

## 2021-09-24 ENCOUNTER — HOSPITAL ENCOUNTER (OUTPATIENT)
Facility: MEDICAL CENTER | Age: 71
End: 2021-09-24
Attending: ANESTHESIOLOGY
Payer: MEDICARE

## 2021-09-24 LAB — COVID ORDER STATUS COVID19: NORMAL

## 2021-09-24 PROCEDURE — U0003 INFECTIOUS AGENT DETECTION BY NUCLEIC ACID (DNA OR RNA); SEVERE ACUTE RESPIRATORY SYNDROME CORONAVIRUS 2 (SARS-COV-2) (CORONAVIRUS DISEASE [COVID-19]), AMPLIFIED PROBE TECHNIQUE, MAKING USE OF HIGH THROUGHPUT TECHNOLOGIES AS DESCRIBED BY CMS-2020-01-R: HCPCS

## 2021-09-24 PROCEDURE — U0005 INFEC AGEN DETEC AMPLI PROBE: HCPCS

## 2021-09-26 LAB
SARS-COV-2 RNA RESP QL NAA+PROBE: NOTDETECTED
SPECIMEN SOURCE: NORMAL

## 2021-10-12 ENCOUNTER — OFFICE VISIT (OUTPATIENT)
Dept: URGENT CARE | Facility: PHYSICIAN GROUP | Age: 71
End: 2021-10-12
Payer: MEDICARE

## 2021-10-12 VITALS
DIASTOLIC BLOOD PRESSURE: 60 MMHG | RESPIRATION RATE: 18 BRPM | HEIGHT: 66 IN | HEART RATE: 94 BPM | BODY MASS INDEX: 29.73 KG/M2 | TEMPERATURE: 98.2 F | WEIGHT: 185 LBS | OXYGEN SATURATION: 99 % | SYSTOLIC BLOOD PRESSURE: 138 MMHG

## 2021-10-12 DIAGNOSIS — J04.0 LARYNGITIS, ACUTE: ICD-10-CM

## 2021-10-12 PROCEDURE — 99213 OFFICE O/P EST LOW 20 MIN: CPT | Performed by: FAMILY MEDICINE

## 2021-10-12 ASSESSMENT — FIBROSIS 4 INDEX: FIB4 SCORE: 1.963961012123931431

## 2021-10-12 NOTE — PROGRESS NOTES
"Subjective     Fiona Daniels is a 71 y.o. female who presents with Sore Throat (swollen,cough,x3 days.)            71-year-old who had recently had a general anesthesia few weeks ago presenting for evaluation of sore throat and dry cough for the past 3 days.  She is vaccinated x3 for Covid.  No other symptoms reported.  Her voice is going in and out of the room.  And she is having some soreness to.  No fever chills.  Review of system otherwise negative.  She is not a smoker.      ROS           Objective     /60 (BP Location: Right arm, Patient Position: Sitting, BP Cuff Size: Adult)   Pulse 94   Temp 36.8 °C (98.2 °F) (Temporal)   Resp 18   Ht 1.676 m (5' 6\")   Wt 83.9 kg (185 lb)   SpO2 99%   BMI 29.86 kg/m²      Physical Exam  Constitutional:       Appearance: She is not ill-appearing or diaphoretic.   HENT:      Head: Normocephalic and atraumatic.      Right Ear: External ear normal.      Left Ear: External ear normal.      Mouth/Throat:      Mouth: Mucous membranes are moist.      Pharynx: Oropharynx is clear. No oropharyngeal exudate or posterior oropharyngeal erythema.   Eyes:      Conjunctiva/sclera: Conjunctivae normal.   Cardiovascular:      Rate and Rhythm: Normal rate and regular rhythm.      Heart sounds: No murmur heard.   No friction rub. No gallop.    Pulmonary:      Effort: Pulmonary effort is normal. No respiratory distress.      Breath sounds: No stridor. No wheezing, rhonchi or rales.   Musculoskeletal:      Cervical back: Neck supple.   Lymphadenopathy:      Cervical: No cervical adenopathy.   Skin:     Coloration: Skin is not jaundiced or pale.   Neurological:      Mental Status: She is alert and oriented to person, place, and time.   Psychiatric:         Behavior: Behavior normal.           Assessment & Plan   ASSESSMENT:PLAN:  1. Laryngitis, acute    Mc viral illness.  Treatment is supportive.  Avoid decongestant, rest voice and no whispering.  Inhale steam may help.  " Likely not related to the surgery she had since there was a.  Of time where she was back to normal.  Recommended to follow-up in a couple of weeks if hoarseness continues, sooner if any worsening of symptoms.  If hoarseness continues she will be needing to be referred to ENT

## 2021-10-12 NOTE — PATIENT INSTRUCTIONS
Laryngitis    Laryngitis is inflammation of the vocal cords that causes symptoms such as hoarseness or loss of voice. The vocal cords are two bands of muscles in your throat. When you speak, these cords come together and vibrate. The vibrations come out through your mouth as sound. When your vocal cords are inflamed, your voice sounds different.  Laryngitis can be temporary (acute) or long-term (chronic). Most cases of acute laryngitis improve with time. Chronic laryngitis is laryngitis that lasts for more than 3 weeks.  What are the causes?  Acute laryngitis may be caused by:  · A viral infection.  · Lots of talking, yelling, or singing. This is also called vocal strain.  · A bacterial infection.  Chronic laryngitis may be caused by:  · Vocal strain.  · Injury to your vocal cords.  · Acid reflux (gastroesophageal reflux disease, or GERD).  · Allergies.  · A sinus infection.  · Smoking.  · Alcohol abuse.  · Breathing in chemicals or dust.  · Growths on the vocal cords.  What increases the risk?  The following factors may make you more likely to develop this condition:  · Smoking.  · Alcohol abuse.  · Having allergies.  · Chronic irritants in the workplace, such as toxic fumes.  What are the signs or symptoms?  Symptoms of this condition may include:  · Low, hoarse voice.  · Loss of voice.  · Dry cough.  · Sore or dry throat.  · Stuffy nose.  How is this diagnosed?  This condition may be diagnosed based on:  · Your symptoms and a physical exam.  · Throat culture.  · Blood test.  · A procedure in which your health care provider looks at your vocal cords with a mirror or viewing tube (laryngoscopy).  How is this treated?  Treatment for laryngitis depends on what is causing it.  · Usually, treatment involves resting your voice and using medicines to soothe your throat.  · If your laryngitis is caused by a bacterial infection, you may need to take antibiotic medicine.  · If your laryngitis is caused by a growth, you may  need to have a procedure to remove it.  Follow these instructions at home:  Medicines  · Take over-the-counter and prescription medicines only as told by your health care provider.  · If you were prescribed an antibiotic medicine, take it as told by your health care provider. Do not stop taking the antibiotic even if you start to feel better.  General instructions  · Talk as little as possible. Also avoid whispering, which can cause vocal strain.  · Write instead of talking. Do this until your voice is back to normal.  · Drink enough fluid to keep your urine pale yellow.  · Breathe in moist air. Use a humidifier if you live in a dry climate.  · Do not use any products that contain nicotine or tobacco, such as cigarettes and e-cigarettes. If you need help quitting, ask your health care provider.  Contact a health care provider if:  · You have a fever.  · You have increasing pain.  · Your symptoms do not get better in 2 weeks.  Get help right away if:  · You cough up blood.  · You have difficulty swallowing.  · You have trouble breathing.  Summary  · Laryngitis is inflammation of the vocal cords that causes symptoms such as hoarseness or loss of voice.  · Laryngitis can be temporary (acute) or long-term (chronic).  · Treatment for laryngitis depends on the cause. It often involves resting your voice and using medicine to soothe your throat.  This information is not intended to replace advice given to you by your health care provider. Make sure you discuss any questions you have with your health care provider.  Document Released: 12/18/2006 Document Revised: 12/05/2018 Document Reviewed: 12/05/2018  [employer] Patient Education © 2020 [employer]

## 2021-10-25 ENCOUNTER — HOSPITAL ENCOUNTER (OUTPATIENT)
Dept: RADIOLOGY | Facility: MEDICAL CENTER | Age: 71
End: 2021-10-25
Attending: FAMILY MEDICINE
Payer: MEDICARE

## 2021-10-25 DIAGNOSIS — R22.31 MASS OF UPPER EXTREMITY, RIGHT: ICD-10-CM

## 2021-10-25 PROCEDURE — 71250 CT THORAX DX C-: CPT | Mod: MH

## 2021-11-19 DIAGNOSIS — I10 ESSENTIAL HYPERTENSION: ICD-10-CM

## 2021-11-22 RX ORDER — METOPROLOL SUCCINATE 25 MG/1
TABLET, EXTENDED RELEASE ORAL
Qty: 90 TABLET | Refills: 3 | Status: SHIPPED | OUTPATIENT
Start: 2021-11-22 | End: 2022-01-19 | Stop reason: SDUPTHER

## 2021-12-06 ENCOUNTER — TELEPHONE (OUTPATIENT)
Dept: CARDIOLOGY | Facility: MEDICAL CENTER | Age: 71
End: 2021-12-06

## 2021-12-06 ENCOUNTER — OFFICE VISIT (OUTPATIENT)
Dept: CARDIOLOGY | Facility: MEDICAL CENTER | Age: 71
End: 2021-12-06
Payer: MEDICARE

## 2021-12-06 VITALS
HEART RATE: 88 BPM | SYSTOLIC BLOOD PRESSURE: 130 MMHG | WEIGHT: 196 LBS | DIASTOLIC BLOOD PRESSURE: 80 MMHG | RESPIRATION RATE: 14 BRPM | HEIGHT: 66 IN | OXYGEN SATURATION: 94 % | BODY MASS INDEX: 31.5 KG/M2

## 2021-12-06 DIAGNOSIS — I10 ESSENTIAL HYPERTENSION: ICD-10-CM

## 2021-12-06 DIAGNOSIS — E78.2 MIXED HYPERLIPIDEMIA: ICD-10-CM

## 2021-12-06 DIAGNOSIS — Z86.79 HISTORY OF SINUS TACHYCARDIA: ICD-10-CM

## 2021-12-06 DIAGNOSIS — I65.23 ATHEROSCLEROSIS OF BOTH CAROTID ARTERIES: ICD-10-CM

## 2021-12-06 PROCEDURE — 99214 OFFICE O/P EST MOD 30 MIN: CPT | Performed by: INTERNAL MEDICINE

## 2021-12-06 RX ORDER — COLCHICINE 0.6 MG/1
TABLET ORAL
COMMUNITY
Start: 2021-11-18 | End: 2022-12-19

## 2021-12-06 RX ORDER — HYDROCHLOROTHIAZIDE 12.5 MG/1
CAPSULE, GELATIN COATED ORAL
COMMUNITY
Start: 2021-11-18 | End: 2021-12-06

## 2021-12-06 ASSESSMENT — ENCOUNTER SYMPTOMS
HEADACHES: 0
CONSTITUTIONAL NEGATIVE: 1
FEVER: 0
SHORTNESS OF BREATH: 0
EYES NEGATIVE: 1
WEAKNESS: 0
DEPRESSION: 0
DIZZINESS: 0
CLAUDICATION: 0
BRUISES/BLEEDS EASILY: 0
WEIGHT LOSS: 0
NEUROLOGICAL NEGATIVE: 1
MUSCULOSKELETAL NEGATIVE: 1
RESPIRATORY NEGATIVE: 1
GASTROINTESTINAL NEGATIVE: 1
NERVOUS/ANXIOUS: 0
ABDOMINAL PAIN: 0
DOUBLE VISION: 0
COUGH: 0
PSYCHIATRIC NEGATIVE: 1
BLURRED VISION: 0
VOMITING: 0
PALPITATIONS: 0
FOCAL WEAKNESS: 0
CHILLS: 0
CARDIOVASCULAR NEGATIVE: 1
MYALGIAS: 0
NAUSEA: 0

## 2021-12-06 ASSESSMENT — FIBROSIS 4 INDEX: FIB4 SCORE: 1.963961012123931431

## 2021-12-06 NOTE — PROGRESS NOTES
"Chief Complaint   Patient presents with   • Hypertension     F/V Dx: Hypertension, unspecified type   • Tachycardia     F/V Dx: Nonsustained ventricular tachycardia (HCC)   • Hyperlipidemia     F/V Dx: Mixed hyperlipidemia       Subjective     Fiona Daniels is a 71 y.o. female who presents today for follow up of sinus tachycardia.    Since the patient's last visit on 09/14/21, she has been doing well clinically. She denies chest pain, shortness of breath, palpitations, nausea/vomiting or diaphoresis. She underwent right hip surgery with improvement of he hip pain. She keeps active walking daily. She did not undergo labs for lipid study.     Past Medical History:   Diagnosis Date   • Anesthesia     mother  \"dead for a minute\"   • Arthritis 07/16/2020    hx Gout   • ASTHMA     well managed   • Cancer (HCC)     left partial masecteomy and 5 lymph nodes removed - June/July 2020   • Carcinoma of upper-outer quadrant of left breast in female, estrogen receptor positive (HCC) 8/5/2020   • Hepatitis A     age 6   • Hyperlipidemia    • Hypertension    • Pain 07/16/2020    back pain     Past Surgical History:   Procedure Laterality Date   • PB TOTAL HIP ARTHROPLASTY Right 9/30/2021    Procedure: RIGHT ANTERIOR TOTAL HIP ARTHROPLASTY;  Surgeon: Syd Chapman M.D.;  Location: [facility];  Service: Orthopedics   • MA REDUCTION OF BREAST Right 7/21/2020    Procedure: MAMMOPLASTY, REDUCTION-RIGHT BREAST FOR SYMMETRY;  Surgeon: Steven Schulz M.D.;  Location: [facility];  Service: General   • PARTIAL MASTECTOMY Left 7/21/2020    Procedure: MASTECTOMY, PARTIAL- WIRE LOC;  Surgeon: Shy Jean M.D.;  Location: [facility];  Service: General   • NODE BIOPSY SENTINEL Left 7/21/2020    Procedure: BIOPSY, LYMPH NODE, SENTINEL;  Surgeon: Shy Jean M.D.;  Location: [facility];  Service: General   • AXILLARY NODE DISSECTION Left 7/21/2020    Procedure: " LYMPHADENECTOMY, AXILLARY- POSS;  Surgeon: Shy Jean M.D.;  Location: SURGERY [facility];  Service: General   • BREAST RECONSTRUCTION Left 7/21/2020    Procedure: RECONSTRUCTION, BREAST-WITH LOCAL TISSUE REARRANGEMENT;  Surgeon: Steven Schulz M.D.;  Location: SURGERY [facility];  Service: General   • OTHER  2014    sinus   • ETHMOIDECTOMY  2/14/2012    Performed by GÓMEZ VALDERRAMA at SURGERY SAME DAY [facility] ORS   • NASAL POLYPECTOMY  2/14/2012    Performed by GÓMEZ VALDERRAMA at SURGERY SAME DAY [facility]S   • NASAL SEPTAL RECONSTRUCTION  2/14/2012    Performed by GÓMEZ VALDERRAMA at SURGERY SAME DAY [facility]   • OTHER ORTHOPEDIC SURGERY      ORIF right wrist     Family History   Problem Relation Age of Onset   • Cancer Mother         Colon   • Heart Disease Father    • Heart Attack Father      Social History     Socioeconomic History   • Marital status:      Spouse name: Not on file   • Number of children: Not on file   • Years of education: Not on file   • Highest education level: Not on file   Occupational History   • Not on file   Tobacco Use   • Smoking status: Never Smoker   • Smokeless tobacco: Never Used   Vaping Use   • Vaping Use: Never used   Substance and Sexual Activity   • Alcohol use: No   • Drug use: No   • Sexual activity: Not on file   Other Topics Concern   • Not on file   Social History Narrative   • Not on file     Social Determinants of Health     Financial Resource Strain:    • Difficulty of Paying Living Expenses: Not on file   Food Insecurity:    • Worried About Running Out of Food in the Last Year: Not on file   • Ran Out of Food in the Last Year: Not on file   Transportation Needs:    • Lack of Transportation (Medical): Not on file   • Lack of Transportation (Non-Medical): Not on file   Physical Activity:    • Days of Exercise per Week: Not on file   • Minutes of Exercise per Session: Not on file   Stress:    • Feeling of Stress : Not on file   Social  "Connections:    • Frequency of Communication with Friends and Family: Not on file   • Frequency of Social Gatherings with Friends and Family: Not on file   • Attends [employer] Services: Not on file   • Active Member of Clubs or Organizations: Not on file   • Attends Club or Organization Meetings: Not on file   • Marital Status: Not on file   Intimate Partner Violence:    • Fear of Current or Ex-Partner: Not on file   • Emotionally Abused: Not on file   • Physically Abused: Not on file   • Sexually Abused: Not on file   Housing Stability:    • Unable to Pay for Housing in the Last Year: Not on file   • Number of Places Lived in the Last Year: Not on file   • Unstable Housing in the Last Year: Not on file     Allergies   Allergen Reactions   • Aspirin Anaphylaxis   • Codeine      \"passed out\"   • Moxifloxacin Hcl In Nacl Rash   • Robitussin Dm [Guiatuss Dm] Anaphylaxis     (Medications reviewed.)  Outpatient Encounter Medications as of 12/6/2021   Medication Sig Dispense Refill   • colchicine (COLCRYS) 0.6 MG Tab      • metoprolol SR (TOPROL XL) 25 MG TABLET SR 24 HR TAKE 1 TABLET BY MOUTH EVERY DAY 90 Tablet 3   • anastrozole (ARIMIDEX) 1 MG Tab Take  by mouth every day. Take 1 tablet by mouth once daily     • atorvastatin (LIPITOR) 20 MG Tab      • trandolapril (MAVIK) 4 MG Tab TK 2 T PO QD  0   • hydrochlorothiazide (HYDRODIURIL) 12.5 MG tablet Take 12.5 mg by mouth every day.     • albuterol (VENTOLIN OR PROVENTIL) 108 (90 BASE) MCG/ACT AERS Inhale 2 Puffs by mouth every four hours as needed for Shortness of Breath. 1 Inhaler 3   • fluticasone-salmeterol (ADVAIR DISKUS) 100-50 MCG/DOSE AEPB Inhale 1 Puff by mouth every day.     • Multiple Vitamins-Minerals (ONE-A-DAY 50 PLUS PO) Take  by mouth every day.     • Cholecalciferol (VITAMIN D3 PO) Take  by mouth every day.     • CALCIUM PO Take  by mouth every day. Pt does not know dose     • [DISCONTINUED] hydrochlorothiazide (MICROZIDE) 12.5 MG capsule  (Patient not " "taking: Reported on 12/6/2021)     • [DISCONTINUED] rivaroxaban (XARELTO) 10 MG Tab tablet Take 1 Tablet by mouth with dinner. (Patient not taking: Reported on 12/6/2021) 30 Tablet 0     No facility-administered encounter medications on file as of 12/6/2021.     Review of Systems   Constitutional: Negative.  Negative for chills, fever, malaise/fatigue and weight loss.   HENT: Negative.  Negative for hearing loss.    Eyes: Negative.  Negative for blurred vision and double vision.   Respiratory: Negative.  Negative for cough and shortness of breath.    Cardiovascular: Negative.  Negative for chest pain, palpitations, claudication and leg swelling.   Gastrointestinal: Negative.  Negative for abdominal pain, nausea and vomiting.   Genitourinary: Negative.  Negative for dysuria and urgency.   Musculoskeletal: Negative.  Negative for joint pain and myalgias.   Skin: Negative.  Negative for itching and rash.   Neurological: Negative.  Negative for dizziness, focal weakness, weakness and headaches.   Endo/Heme/Allergies: Negative.  Does not bruise/bleed easily.   Psychiatric/Behavioral: Negative.  Negative for depression. The patient is not nervous/anxious.               Objective     /80 (BP Location: Left arm, Patient Position: Sitting, BP Cuff Size: Adult)   Pulse 88   Resp 14   Ht 1.676 m (5' 6\")   Wt 88.9 kg (196 lb)   SpO2 94%   BMI 31.64 kg/m²     Physical Exam  Constitutional:       Appearance: She is well-developed.   HENT:      Head: Normocephalic and atraumatic.   Neck:      Vascular: No JVD.   Cardiovascular:      Rate and Rhythm: Normal rate and regular rhythm.      Heart sounds: Normal heart sounds.   Pulmonary:      Effort: Pulmonary effort is normal.      Breath sounds: Normal breath sounds.   Abdominal:      General: Bowel sounds are normal.      Palpations: Abdomen is soft.      Comments: No hepatosplenomegaly.   Musculoskeletal:         General: Normal range of motion.   Lymphadenopathy:      " Cervical: No cervical adenopathy.   Skin:     General: Skin is warm and dry.   Neurological:      Mental Status: She is alert and oriented to person, place, and time.            CARDIAC STUDIES/PROCEDURES:     CAROTID ULTRASOUND (01/10/19)  1.  There is a mild amount of atherosclerotic plaque.  Plaque is located in carotid bulbs and proximal internal carotid arteries.  Plaque characterization:  calcific  2.  There is no evidence of carotid occlusion.  3. Vertebral arteries demonstrate antegrade flow.  4. Diameter reduction in the internal carotid arteries: less than 50%. There is no hemodynamically significant stenosis.    ECHOCARDIOGRAM CONCLUSIONS (11/01/19)  No prior study is available for comparison.   Hyperdynamic left ventricular systolic function.  Left ventricular ejection fraction is visually estimated to be greater than 75%.  No significant valve abnormality.  Unable to estimate RVSP, normal RAP.     EKG performed on (07/16/20) EKG shows sinus rhythm.     HOLTER MONITOR (10/23/19)  48 Hour Holter Summary:   Sinus, normal heart rate range, no pauses.   Rare PVCs.   Rare PACs.   One 14 beat ventricular run, rate 129 bpm, occurred at 2:25 AM.   No symptoms reported.      Laboratory results of (07/01/21) holesterol profile of 158/145/40/89 mg/dL noted.     PET SCAN (12/04/19)  ELECTROCARDIOGRAPHIC FINDINGS:    Normal sinus rhythm.  Normal ECG response to dipyridamole infusion.  No ischemic changes noted   SCINTOGRAPHIC FINDINGS:    Normal left ventricular perfusion with stress and rest images.    There is no evidence of ischemia or scar.   However TID (transient ischemic dialatation) is noted (ratio is 2.0), can be assosiated with balance three vessel disease, correlate clinically.   GATED WALL MOTION FINDINGS:    The left ventricle wall motion is normal with stress and rest  imagings.  Measured resting ejection fraction is 79 %.   stress EF 89%.         Assessment & Plan     1. History of sinus tachycardia      2. Essential hypertension     3. Mixed hyperlipidemia     4. Atherosclerosis of both carotid arteries         Medical Decision Making: Today's Assessment/Status/Plan:        1. Sinus tachycardia: She is clinically doing well on beta blockade therapy.  2. Hypertension: Blood pressure is well controlled. We will continue with metoprolol and trandolapril.  3. Hyperlipidemia: She is doing well on statin therapy without myalgia symptoms. We will repeat labs including fasting lipid profile.  4. Carotid artery stenosis: Clinically stable on above medical therapy.  5. Additional information: She is a retired nurse from [employer].     We will follow up in 1 months with labs.    CC Nba Gonzalez

## 2021-12-06 NOTE — TELEPHONE ENCOUNTER
Faxed request to [facility] for most recent labs results. Conformation received and scan into [facility].

## 2021-12-22 ENCOUNTER — HOSPITAL ENCOUNTER (OUTPATIENT)
Dept: LAB | Facility: MEDICAL CENTER | Age: 71
End: 2021-12-22
Attending: FAMILY MEDICINE
Payer: MEDICARE

## 2021-12-22 LAB
ALBUMIN SERPL BCP-MCNC: 4.2 G/DL (ref 3.2–4.9)
ALBUMIN/GLOB SERPL: 1.5 G/DL
ALP SERPL-CCNC: 92 U/L (ref 30–99)
ALT SERPL-CCNC: 20 U/L (ref 2–50)
ANION GAP SERPL CALC-SCNC: 12 MMOL/L (ref 7–16)
AST SERPL-CCNC: 20 U/L (ref 12–45)
BASOPHILS # BLD AUTO: 0.4 % (ref 0–1.8)
BASOPHILS # BLD: 0.02 K/UL (ref 0–0.12)
BILIRUB SERPL-MCNC: 1.2 MG/DL (ref 0.1–1.5)
BUN SERPL-MCNC: 17 MG/DL (ref 8–22)
CALCIUM SERPL-MCNC: 9.8 MG/DL (ref 8.5–10.5)
CHLORIDE SERPL-SCNC: 103 MMOL/L (ref 96–112)
CHOLEST SERPL-MCNC: 142 MG/DL (ref 100–199)
CO2 SERPL-SCNC: 25 MMOL/L (ref 20–33)
CREAT SERPL-MCNC: 0.64 MG/DL (ref 0.5–1.4)
EOSINOPHIL # BLD AUTO: 0.13 K/UL (ref 0–0.51)
EOSINOPHIL NFR BLD: 2.5 % (ref 0–6.9)
ERYTHROCYTE [DISTWIDTH] IN BLOOD BY AUTOMATED COUNT: 47.4 FL (ref 35.9–50)
GLOBULIN SER CALC-MCNC: 2.8 G/DL (ref 1.9–3.5)
GLUCOSE SERPL-MCNC: 90 MG/DL (ref 65–99)
HCT VFR BLD AUTO: 45.8 % (ref 37–47)
HDLC SERPL-MCNC: 44 MG/DL
HGB BLD-MCNC: 15.5 G/DL (ref 12–16)
IMM GRANULOCYTES # BLD AUTO: 0.01 K/UL (ref 0–0.11)
IMM GRANULOCYTES NFR BLD AUTO: 0.2 % (ref 0–0.9)
LDLC SERPL CALC-MCNC: 76 MG/DL
LYMPHOCYTES # BLD AUTO: 1.39 K/UL (ref 1–4.8)
LYMPHOCYTES NFR BLD: 26.7 % (ref 22–41)
MCH RBC QN AUTO: 33.3 PG (ref 27–33)
MCHC RBC AUTO-ENTMCNC: 33.8 G/DL (ref 33.6–35)
MCV RBC AUTO: 98.3 FL (ref 81.4–97.8)
MONOCYTES # BLD AUTO: 0.43 K/UL (ref 0–0.85)
MONOCYTES NFR BLD AUTO: 8.3 % (ref 0–13.4)
NEUTROPHILS # BLD AUTO: 3.23 K/UL (ref 2–7.15)
NEUTROPHILS NFR BLD: 61.9 % (ref 44–72)
NRBC # BLD AUTO: 0 K/UL
NRBC BLD-RTO: 0 /100 WBC
PLATELET # BLD AUTO: 213 K/UL (ref 164–446)
PMV BLD AUTO: 10.6 FL (ref 9–12.9)
POTASSIUM SERPL-SCNC: 3.8 MMOL/L (ref 3.6–5.5)
PROT SERPL-MCNC: 7 G/DL (ref 6–8.2)
RBC # BLD AUTO: 4.66 M/UL (ref 4.2–5.4)
SODIUM SERPL-SCNC: 140 MMOL/L (ref 135–145)
TRIGL SERPL-MCNC: 112 MG/DL (ref 0–149)
WBC # BLD AUTO: 5.2 K/UL (ref 4.8–10.8)

## 2021-12-22 PROCEDURE — 85025 COMPLETE CBC W/AUTO DIFF WBC: CPT

## 2021-12-22 PROCEDURE — 80061 LIPID PANEL: CPT

## 2021-12-22 PROCEDURE — 36415 COLL VENOUS BLD VENIPUNCTURE: CPT

## 2021-12-22 PROCEDURE — 80053 COMPREHEN METABOLIC PANEL: CPT

## 2022-01-19 ENCOUNTER — OFFICE VISIT (OUTPATIENT)
Dept: CARDIOLOGY | Facility: MEDICAL CENTER | Age: 72
End: 2022-01-19
Payer: MEDICARE

## 2022-01-19 VITALS
SYSTOLIC BLOOD PRESSURE: 140 MMHG | DIASTOLIC BLOOD PRESSURE: 60 MMHG | RESPIRATION RATE: 14 BRPM | HEIGHT: 66 IN | BODY MASS INDEX: 31.98 KG/M2 | HEART RATE: 80 BPM | WEIGHT: 199 LBS | OXYGEN SATURATION: 95 %

## 2022-01-19 DIAGNOSIS — I10 ESSENTIAL HYPERTENSION: ICD-10-CM

## 2022-01-19 DIAGNOSIS — I65.23 ATHEROSCLEROSIS OF BOTH CAROTID ARTERIES: ICD-10-CM

## 2022-01-19 DIAGNOSIS — E78.2 MIXED HYPERLIPIDEMIA: ICD-10-CM

## 2022-01-19 DIAGNOSIS — Z86.79 HISTORY OF SINUS TACHYCARDIA: ICD-10-CM

## 2022-01-19 PROCEDURE — 99213 OFFICE O/P EST LOW 20 MIN: CPT | Performed by: INTERNAL MEDICINE

## 2022-01-19 RX ORDER — METOPROLOL SUCCINATE 25 MG/1
25 TABLET, EXTENDED RELEASE ORAL
Qty: 90 TABLET | Refills: 3 | Status: SHIPPED | OUTPATIENT
Start: 2022-01-19 | End: 2022-11-14 | Stop reason: SDUPTHER

## 2022-01-19 ASSESSMENT — ENCOUNTER SYMPTOMS
NEUROLOGICAL NEGATIVE: 1
DIZZINESS: 0
VOMITING: 0
WEAKNESS: 0
NERVOUS/ANXIOUS: 0
MUSCULOSKELETAL NEGATIVE: 1
EYES NEGATIVE: 1
CARDIOVASCULAR NEGATIVE: 1
CLAUDICATION: 0
NAUSEA: 0
PSYCHIATRIC NEGATIVE: 1
RESPIRATORY NEGATIVE: 1
COUGH: 0
HEADACHES: 0
MYALGIAS: 0
CHILLS: 0
BLURRED VISION: 0
DEPRESSION: 0
FEVER: 0
ABDOMINAL PAIN: 0
FOCAL WEAKNESS: 0
BRUISES/BLEEDS EASILY: 0
DOUBLE VISION: 0
SHORTNESS OF BREATH: 0
PALPITATIONS: 0
GASTROINTESTINAL NEGATIVE: 1
WEIGHT LOSS: 0
CONSTITUTIONAL NEGATIVE: 1

## 2022-01-19 ASSESSMENT — FIBROSIS 4 INDEX: FIB4 SCORE: 1.51

## 2022-01-19 NOTE — PROGRESS NOTES
"Chief Complaint   Patient presents with   • Hypertension     F/V Dx: Essential hypertension   • Tachycardia     F/V Dx: History of sinus tachycardia   • Hyperlipidemia     F/V Dx: Mixed hyperlipidemia       Subjective     Fiona Daniels is a 71 y.o. female who presents today for follow up of hyperlipidemia, lab results review.    Since the patient's last visit on 12/06/21, she has been doing well clinically. She denies chest pain, shortness of breath, palpitations, nausea/vomiting or diaphoresis. She is active able to walk following her hip surgery.     Past Medical History:   Diagnosis Date   • Anesthesia     mother  \"dead for a minute\"   • Arthritis 07/16/2020    hx Gout   • ASTHMA     well managed   • Cancer (HCC)     left partial masecteomy and 5 lymph nodes removed - June/July 2020   • Carcinoma of upper-outer quadrant of left breast in female, estrogen receptor positive (HCC) 8/5/2020   • Hepatitis A     age 6   • Hyperlipidemia    • Hypertension    • Pain 07/16/2020    back pain     Past Surgical History:   Procedure Laterality Date   • PB TOTAL HIP ARTHROPLASTY Right 9/30/2021    Procedure: RIGHT ANTERIOR TOTAL HIP ARTHROPLASTY;  Surgeon: Syd Chapman M.D.;  Location: [facility];  Service: Orthopedics   • SD REDUCTION OF BREAST Right 7/21/2020    Procedure: MAMMOPLASTY, REDUCTION-RIGHT BREAST FOR SYMMETRY;  Surgeon: Steven Schulz M.D.;  Location: [facility];  Service: General   • PARTIAL MASTECTOMY Left 7/21/2020    Procedure: MASTECTOMY, PARTIAL- WIRE LOC;  Surgeon: Shy Jean M.D.;  Location: [facility];  Service: General   • NODE BIOPSY SENTINEL Left 7/21/2020    Procedure: BIOPSY, LYMPH NODE, SENTINEL;  Surgeon: Shy Jean M.D.;  Location: [facility];  Service: General   • AXILLARY NODE DISSECTION Left 7/21/2020    Procedure: LYMPHADENECTOMY, AXILLARY- POSS;  Surgeon: Shy Jean M.D.;  Location: [facility];  " Service: General   • BREAST RECONSTRUCTION Left 7/21/2020    Procedure: RECONSTRUCTION, BREAST-WITH LOCAL TISSUE REARRANGEMENT;  Surgeon: Steven Schulz M.D.;  Location: SURGERY [facility];  Service: General   • OTHER  2014    sinus   • ETHMOIDECTOMY  2/14/2012    Performed by GÓMEZ VALDERRAMA at SURGERY SAME DAY [facility]   • NASAL POLYPECTOMY  2/14/2012    Performed by GÓMEZ VALDERRAMA at SURGERY SAME DAY [facility]   • NASAL SEPTAL RECONSTRUCTION  2/14/2012    Performed by GÓMEZ VALDERRAMA at SURGERY SAME DAY [facility]   • OTHER ORTHOPEDIC SURGERY      ORIF right wrist     Family History   Problem Relation Age of Onset   • Cancer Mother         Colon   • Heart Disease Father    • Heart Attack Father      Social History     Socioeconomic History   • Marital status:      Spouse name: Not on file   • Number of children: Not on file   • Years of education: Not on file   • Highest education level: Not on file   Occupational History   • Not on file   Tobacco Use   • Smoking status: Never Smoker   • Smokeless tobacco: Never Used   Vaping Use   • Vaping Use: Never used   Substance and Sexual Activity   • Alcohol use: No   • Drug use: No   • Sexual activity: Not on file   Other Topics Concern   • Not on file   Social History Narrative   • Not on file     Social Determinants of Health     Financial Resource Strain:    • Difficulty of Paying Living Expenses: Not on file   Food Insecurity:    • Worried About Running Out of Food in the Last Year: Not on file   • Ran Out of Food in the Last Year: Not on file   Transportation Needs:    • Lack of Transportation (Medical): Not on file   • Lack of Transportation (Non-Medical): Not on file   Physical Activity:    • Days of Exercise per Week: Not on file   • Minutes of Exercise per Session: Not on file   Stress:    • Feeling of Stress : Not on file   Social Connections:    • Frequency of Communication with Friends and Family: Not on file   • Frequency of Social  "Gatherings with Friends and Family: Not on file   • Attends Faith Services: Not on file   • Active Member of Clubs or Organizations: Not on file   • Attends Club or Organization Meetings: Not on file   • Marital Status: Not on file   Intimate Partner Violence:    • Fear of Current or Ex-Partner: Not on file   • Emotionally Abused: Not on file   • Physically Abused: Not on file   • Sexually Abused: Not on file   Housing Stability:    • Unable to Pay for Housing in the Last Year: Not on file   • Number of Places Lived in the Last Year: Not on file   • Unstable Housing in the Last Year: Not on file     Allergies   Allergen Reactions   • Aspirin Anaphylaxis   • Codeine      \"passed out\"   • Moxifloxacin Hcl In Nacl Rash   • Robitussin Dm [Guiatuss Dm] Anaphylaxis     (Medications reviewed.)  Outpatient Encounter Medications as of 1/19/2022   Medication Sig Dispense Refill   • colchicine (COLCRYS) 0.6 MG Tab      • metoprolol SR (TOPROL XL) 25 MG TABLET SR 24 HR TAKE 1 TABLET BY MOUTH EVERY DAY 90 Tablet 3   • anastrozole (ARIMIDEX) 1 MG Tab Take  by mouth every day. Take 1 tablet by mouth once daily     • atorvastatin (LIPITOR) 20 MG Tab      • trandolapril (MAVIK) 4 MG Tab TK 2 T PO QD  0   • hydrochlorothiazide (HYDRODIURIL) 12.5 MG tablet Take 12.5 mg by mouth every day.     • albuterol (VENTOLIN OR PROVENTIL) 108 (90 BASE) MCG/ACT AERS Inhale 2 Puffs by mouth every four hours as needed for Shortness of Breath. 1 Inhaler 3   • fluticasone-salmeterol (ADVAIR DISKUS) 100-50 MCG/DOSE AEPB Inhale 1 Puff by mouth every day.     • Multiple Vitamins-Minerals (ONE-A-DAY 50 PLUS PO) Take  by mouth every day.     • Cholecalciferol (VITAMIN D3 PO) Take  by mouth every day.     • CALCIUM PO Take  by mouth every day. Pt does not know dose       No facility-administered encounter medications on file as of 1/19/2022.     Review of Systems   Constitutional: Negative.  Negative for chills, fever, malaise/fatigue and weight loss. " "  HENT: Negative.  Negative for hearing loss.    Eyes: Negative.  Negative for blurred vision and double vision.   Respiratory: Negative.  Negative for cough and shortness of breath.    Cardiovascular: Negative.  Negative for chest pain, palpitations, claudication and leg swelling.   Gastrointestinal: Negative.  Negative for abdominal pain, nausea and vomiting.   Genitourinary: Negative.  Negative for dysuria and urgency.   Musculoskeletal: Negative.  Negative for joint pain and myalgias.   Skin: Negative.  Negative for itching and rash.   Neurological: Negative.  Negative for dizziness, focal weakness, weakness and headaches.   Endo/Heme/Allergies: Negative.  Does not bruise/bleed easily.   Psychiatric/Behavioral: Negative.  Negative for depression. The patient is not nervous/anxious.               Objective     /60 (BP Location: Right arm, Patient Position: Sitting, BP Cuff Size: Adult)   Pulse 80   Resp 14   Ht 1.676 m (5' 6\")   Wt 90.3 kg (199 lb)   SpO2 95%   BMI 32.12 kg/m²     Physical Exam  Constitutional:       Appearance: She is well-developed.   HENT:      Head: Normocephalic and atraumatic.   Neck:      Vascular: No JVD.   Cardiovascular:      Rate and Rhythm: Normal rate and regular rhythm.      Heart sounds: Normal heart sounds.   Pulmonary:      Effort: Pulmonary effort is normal.      Breath sounds: Normal breath sounds.   Abdominal:      General: Bowel sounds are normal.      Palpations: Abdomen is soft.      Comments: No hepatosplenomegaly.   Musculoskeletal:         General: Normal range of motion.   Lymphadenopathy:      Cervical: No cervical adenopathy.   Skin:     General: Skin is warm and dry.   Neurological:      Mental Status: She is alert and oriented to person, place, and time.            CARDIAC STUDIES/PROCEDURES:     CAROTID ULTRASOUND (01/10/19)  1.  There is a mild amount of atherosclerotic plaque.  Plaque is located in carotid bulbs and proximal internal carotid " arteries.  Plaque characterization:  calcific  2.  There is no evidence of carotid occlusion.  3. Vertebral arteries demonstrate antegrade flow.  4. Diameter reduction in the internal carotid arteries: less than 50%. There is no hemodynamically significant stenosis.     ECHOCARDIOGRAM CONCLUSIONS (11/01/19)  No prior study is available for comparison.   Hyperdynamic left ventricular systolic function.  Left ventricular ejection fraction is visually estimated to be greater than 75%.  No significant valve abnormality.  Unable to estimate RVSP, normal RAP.     EKG performed on (07/16/20) EKG shows sinus rhythm.     HOLTER MONITOR (10/23/19)  48 Hour Holter Summary:   Sinus, normal heart rate range, no pauses.   Rare PVCs.   Rare PACs.   One 14 beat ventricular run, rate 129 bpm, occurred at 2:25 AM.   No symptoms reported.      Laboratory results of (12/22/21) were reviewed. Cholesterol profile of 142/112/44/76 mg/dL noted.  Laboratory results of (07/01/21) cholesterol profile of 158/145/40/89 mg/dL noted.    PET SCAN (12/04/19)  ELECTROCARDIOGRAPHIC FINDINGS:    Normal sinus rhythm.  Normal ECG response to dipyridamole infusion.  No ischemic changes noted   SCINTOGRAPHIC FINDINGS:    Normal left ventricular perfusion with stress and rest images.    There is no evidence of ischemia or scar.   However TID (transient ischemic dialatation) is noted (ratio is 2.0), can be assosiated with balance three vessel disease, correlate clinically.   GATED WALL MOTION FINDINGS:    The left ventricle wall motion is normal with stress and rest  imagings.  Measured resting ejection fraction is 79 %.   stress EF 89%.         Assessment & Plan     1. Mixed hyperlipidemia     2. Essential hypertension     3. Atherosclerosis of both carotid arteries     4. History of sinus tachycardia         Medical Decision Making: Today's Assessment/Status/Plan:        1. Hyperlipidemia: She is doing well on statin therapy without myalgia symptoms.  (Managed by primary care physician)  2. Hypertension: Blood pressure is well controlled. We will continue with metoprolol and trandolapril.  3. Carotid artery stenosis: Clinically stable on above medical therapy.  4. Sinus tachycardia: She is clinically doing well on beta blockade therapy.  5. 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      We will follow up the patient in one year.    CC Nba Gonzalez

## 2022-02-07 ENCOUNTER — APPOINTMENT (RX ONLY)
Dept: URBAN - METROPOLITAN AREA CLINIC 6 | Facility: CLINIC | Age: 72
Setting detail: DERMATOLOGY
End: 2022-02-07

## 2022-02-07 DIAGNOSIS — L81.4 OTHER MELANIN HYPERPIGMENTATION: ICD-10-CM

## 2022-02-07 DIAGNOSIS — L82.1 OTHER SEBORRHEIC KERATOSIS: ICD-10-CM

## 2022-02-07 DIAGNOSIS — D18.0 HEMANGIOMA: ICD-10-CM

## 2022-02-07 DIAGNOSIS — L72.0 EPIDERMAL CYST: ICD-10-CM

## 2022-02-07 DIAGNOSIS — D22 MELANOCYTIC NEVI: ICD-10-CM

## 2022-02-07 DIAGNOSIS — Z71.89 OTHER SPECIFIED COUNSELING: ICD-10-CM

## 2022-02-07 DIAGNOSIS — L91.0 HYPERTROPHIC SCAR: ICD-10-CM

## 2022-02-07 DIAGNOSIS — L57.0 ACTINIC KERATOSIS: ICD-10-CM

## 2022-02-07 PROBLEM — D22.5 MELANOCYTIC NEVI OF TRUNK: Status: ACTIVE | Noted: 2022-02-07

## 2022-02-07 PROBLEM — D18.01 HEMANGIOMA OF SKIN AND SUBCUTANEOUS TISSUE: Status: ACTIVE | Noted: 2022-02-07

## 2022-02-07 PROCEDURE — ? COUNSELING

## 2022-02-07 PROCEDURE — ? INTRALESIONAL KENALOG

## 2022-02-07 PROCEDURE — ? SUNSCREEN TREATMENT REGIMEN

## 2022-02-07 PROCEDURE — 11900 INJECT SKIN LESIONS </W 7: CPT | Mod: 59

## 2022-02-07 PROCEDURE — ? EXTRACTIONS

## 2022-02-07 PROCEDURE — ? SUNSCREEN RECOMMENDATIONS

## 2022-02-07 PROCEDURE — ? LIQUID NITROGEN (COSMETIC)

## 2022-02-07 PROCEDURE — ? LIQUID NITROGEN

## 2022-02-07 PROCEDURE — 99213 OFFICE O/P EST LOW 20 MIN: CPT | Mod: 25

## 2022-02-07 PROCEDURE — 17003 DESTRUCT PREMALG LES 2-14: CPT

## 2022-02-07 PROCEDURE — 17000 DESTRUCT PREMALG LESION: CPT

## 2022-02-07 ASSESSMENT — LOCATION ZONE DERM
LOCATION ZONE: TRUNK
LOCATION ZONE: FACE
LOCATION ZONE: NOSE
LOCATION ZONE: EAR
LOCATION ZONE: HAND
LOCATION ZONE: SCALP

## 2022-02-07 ASSESSMENT — LOCATION SIMPLE DESCRIPTION DERM
LOCATION SIMPLE: RIGHT SCALP
LOCATION SIMPLE: ABDOMEN
LOCATION SIMPLE: CHEST
LOCATION SIMPLE: RIGHT FOREHEAD
LOCATION SIMPLE: LEFT HAND
LOCATION SIMPLE: LEFT CHEEK
LOCATION SIMPLE: LEFT NOSE
LOCATION SIMPLE: RIGHT EAR
LOCATION SIMPLE: RIGHT HAND
LOCATION SIMPLE: RIGHT EYEBROW
LOCATION SIMPLE: NOSE
LOCATION SIMPLE: RIGHT BREAST
LOCATION SIMPLE: LEFT FOREHEAD

## 2022-02-07 ASSESSMENT — LOCATION DETAILED DESCRIPTION DERM
LOCATION DETAILED: RIGHT ANTERIOR EARLOBE
LOCATION DETAILED: LEFT ULNAR DORSAL HAND
LOCATION DETAILED: RIGHT RADIAL DORSAL HAND
LOCATION DETAILED: LEFT INFERIOR MEDIAL MALAR CHEEK
LOCATION DETAILED: MIDDLE STERNUM
LOCATION DETAILED: LEFT FOREHEAD
LOCATION DETAILED: RIGHT MEDIAL FRONTAL SCALP
LOCATION DETAILED: RIGHT CENTRAL FRONTAL SCALP
LOCATION DETAILED: LEFT NASAL SIDEWALL
LOCATION DETAILED: EPIGASTRIC SKIN
LOCATION DETAILED: NASAL DORSUM
LOCATION DETAILED: LEFT INFERIOR CENTRAL MALAR CHEEK
LOCATION DETAILED: RIGHT MEDIAL BREAST 4-5:00 REGION
LOCATION DETAILED: PERIUMBILICAL SKIN
LOCATION DETAILED: RIGHT MEDIAL EYEBROW
LOCATION DETAILED: RIGHT MEDIAL SUPERIOR CHEST
LOCATION DETAILED: RIGHT SUPERIOR HELIX
LOCATION DETAILED: RIGHT SUPERIOR MEDIAL FOREHEAD

## 2022-02-07 NOTE — PROCEDURE: LIQUID NITROGEN
Duration Of Freeze Thaw-Cycle (Seconds): 10
Show Aperture Variable?: Yes
Number Of Freeze-Thaw Cycles: 2 freeze-thaw cycles
Detail Level: Detailed
Render Post-Care Instructions In Note?: no
Consent: The patient's consent was obtained including but not limited to risks of crusting, scabbing, blistering, scarring, darker or lighter pigmentary change, recurrence, incomplete removal and infection.
Post-Care Instructions: I reviewed with the patient in detail post-care instructions. Patient is to wear sunprotection, and avoid picking at any of the treated lesions. Pt may apply Vaseline to crusted or scabbing areas.

## 2022-02-07 NOTE — PROCEDURE: MIPS QUALITY
Quality 111:Pneumonia Vaccination Status For Older Adults: Pneumococcal Vaccination Previously Received
Detail Level: Detailed
Quality 130: Documentation Of Current Medications In The Medical Record: Current Medications Documented
Quality 226: Preventive Care And Screening: Tobacco Use: Screening And Cessation Intervention: Patient screened for tobacco use and is an ex/non-smoker
Quality 431: Preventive Care And Screening: Unhealthy Alcohol Use - Screening: Patient not identified as an unhealthy alcohol user when screened for unhealthy alcohol use using a systematic screening method

## 2022-02-07 NOTE — PROCEDURE: EXTRACTIONS
Render Post-Care Instructions In Note?: no
Extraction Method: sterile needle
Post-Care Instructions: I reviewed with the patient in detail post-care instructions. Patient is to keep the treatment areas dry overnight, and then apply bacitracin twice daily until healed. Patient may apply hydrogen peroxide soaks to remove any crusting.
Detail Level: Detailed
Prep Text (Optional): Prior to removal the treatment areas were prepped in the usual fashion.
Acne Type: A milial cyst
Consent was obtained and risks were reviewed including but not limited to scarring, infection, bleeding, scabbing, incomplete removal, and allergy to anesthesia.

## 2022-02-07 NOTE — PROCEDURE: INTRALESIONAL KENALOG
Expiration Date For Kenalog (Optional): MAR 2023
X Size Of Lesion In Cm (Optional): 0
Lot # For Kenalog (Optional): [id number]
Validate Note Data When Using Inventory: Yes
Detail Level: Detailed
Medical Necessity Clause: This procedure was medically necessary because the lesions that were treated were:
Kenalog Preparation: Kenalog
Consent: The risks of atrophy were reviewed with the patient.
Ndc# For Kenalog Only: 8836-6509-52
Include Z78.9 (Other Specified Conditions Influencing Health Status) As An Associated Diagnosis?: No
Administered By (Optional): VIDHI
Concentration Of Kenalog Solution Injected (Mg/Ml): 5.0
Total Volume (Ccs): 0.3

## 2022-02-07 NOTE — PROCEDURE: LIQUID NITROGEN (COSMETIC)
Show Spray Paint Technique Variable?: Yes
Spray Paint Technique: No
Spray Paint Text: The liquid nitrogen was applied to the skin utilizing a spray paint frosting technique.
Post-Care Instructions: I reviewed with the patient in detail post-care instructions. Patient is to wear sunprotection, and avoid picking at any of the treated lesions. Pt may apply Vaseline to crusted or scabbing areas.
Price (Use Numbers Only, No Special Characters Or $): 0
Consent: The patient's consent was obtained including but not limited to risks of crusting, scabbing, blistering, scarring, darker or lighter pigmentary change, recurrence, incomplete removal and infection. The patient understands that the procedure is cosmetic in nature and is not covered by insurance.
Billing Information: Bill by Static [employer]
Detail Level: Detailed

## 2022-02-17 ENCOUNTER — TELEPHONE (OUTPATIENT)
Dept: SLEEP MEDICINE | Facility: MEDICAL CENTER | Age: 72
End: 2022-02-17
Payer: MEDICARE

## 2022-02-18 ENCOUNTER — HOSPITAL ENCOUNTER (OUTPATIENT)
Dept: LAB | Facility: MEDICAL CENTER | Age: 72
End: 2022-02-18
Attending: FAMILY MEDICINE
Payer: MEDICARE

## 2022-02-18 LAB
BUN SERPL-MCNC: 20 MG/DL (ref 8–22)
CREAT SERPL-MCNC: 0.77 MG/DL (ref 0.5–1.4)

## 2022-02-18 PROCEDURE — 84520 ASSAY OF UREA NITROGEN: CPT

## 2022-02-18 PROCEDURE — 82565 ASSAY OF CREATININE: CPT

## 2022-02-18 PROCEDURE — 36415 COLL VENOUS BLD VENIPUNCTURE: CPT

## 2022-02-23 ENCOUNTER — HOSPITAL ENCOUNTER (OUTPATIENT)
Dept: RADIOLOGY | Facility: MEDICAL CENTER | Age: 72
End: 2022-02-23
Attending: FAMILY MEDICINE
Payer: MEDICARE

## 2022-02-23 DIAGNOSIS — R22.31 MASS OF SKIN OF RIGHT SHOULDER: ICD-10-CM

## 2022-02-23 PROCEDURE — 71260 CT THORAX DX C+: CPT | Mod: MH

## 2022-02-23 PROCEDURE — 700117 HCHG RX CONTRAST REV CODE 255: Performed by: FAMILY MEDICINE

## 2022-02-23 RX ADMIN — IOHEXOL 75 ML: 350 INJECTION, SOLUTION INTRAVENOUS at 10:03

## 2022-03-15 NOTE — TELEPHONE ENCOUNTER
Patient just calling to see if they had left their phone here. The  was seen in the office.    #[phone]   I have a kidney stone

## 2022-05-04 ENCOUNTER — HOSPITAL ENCOUNTER (OUTPATIENT)
Dept: RADIOLOGY | Facility: MEDICAL CENTER | Age: 72
End: 2022-05-04
Attending: INTERNAL MEDICINE
Payer: MEDICARE

## 2022-05-04 DIAGNOSIS — Z12.31 ENCOUNTER FOR MAMMOGRAM TO ESTABLISH BASELINE MAMMOGRAM: ICD-10-CM

## 2022-05-04 PROCEDURE — 77063 BREAST TOMOSYNTHESIS BI: CPT

## 2022-05-06 ENCOUNTER — HOSPITAL ENCOUNTER (OUTPATIENT)
Dept: RADIOLOGY | Facility: MEDICAL CENTER | Age: 72
End: 2022-05-06
Attending: INTERNAL MEDICINE
Payer: MEDICARE

## 2022-05-06 DIAGNOSIS — Z79.811 LONG TERM (CURRENT) USE OF AROMATASE INHIBITORS: ICD-10-CM

## 2022-05-06 DIAGNOSIS — C50.812 MALIGNANT NEOPLASM OF OVERLAPPING SITES OF LEFT FEMALE BREAST, UNSPECIFIED ESTROGEN RECEPTOR STATUS (HCC): ICD-10-CM

## 2022-05-06 PROCEDURE — 76641 ULTRASOUND BREAST COMPLETE: CPT

## 2022-05-27 ENCOUNTER — HOSPITAL ENCOUNTER (OUTPATIENT)
Dept: LAB | Facility: MEDICAL CENTER | Age: 72
End: 2022-05-27
Attending: FAMILY MEDICINE
Payer: MEDICARE

## 2022-05-27 LAB
ALBUMIN SERPL BCP-MCNC: 3.8 G/DL (ref 3.2–4.9)
ALBUMIN/GLOB SERPL: 1.5 G/DL
ALP SERPL-CCNC: 83 U/L (ref 30–99)
ALT SERPL-CCNC: 14 U/L (ref 2–50)
ANION GAP SERPL CALC-SCNC: 10 MMOL/L (ref 7–16)
AST SERPL-CCNC: 19 U/L (ref 12–45)
BILIRUB SERPL-MCNC: 1.3 MG/DL (ref 0.1–1.5)
BUN SERPL-MCNC: 24 MG/DL (ref 8–22)
CALCIUM SERPL-MCNC: 9.5 MG/DL (ref 8.5–10.5)
CHLORIDE SERPL-SCNC: 105 MMOL/L (ref 96–112)
CO2 SERPL-SCNC: 25 MMOL/L (ref 20–33)
CREAT SERPL-MCNC: 0.72 MG/DL (ref 0.5–1.4)
EST. AVERAGE GLUCOSE BLD GHB EST-MCNC: 111 MG/DL
GFR SERPLBLD CREATININE-BSD FMLA CKD-EPI: 88 ML/MIN/1.73 M 2
GLOBULIN SER CALC-MCNC: 2.6 G/DL (ref 1.9–3.5)
GLUCOSE SERPL-MCNC: 105 MG/DL (ref 65–99)
HBA1C MFR BLD: 5.5 % (ref 4–5.6)
POTASSIUM SERPL-SCNC: 4.2 MMOL/L (ref 3.6–5.5)
PROT SERPL-MCNC: 6.4 G/DL (ref 6–8.2)
SODIUM SERPL-SCNC: 140 MMOL/L (ref 135–145)

## 2022-05-27 PROCEDURE — 83036 HEMOGLOBIN GLYCOSYLATED A1C: CPT

## 2022-05-27 PROCEDURE — 36415 COLL VENOUS BLD VENIPUNCTURE: CPT

## 2022-05-27 PROCEDURE — 80053 COMPREHEN METABOLIC PANEL: CPT

## 2022-07-19 PROBLEM — M21.611 BUNION, RIGHT FOOT: Status: ACTIVE | Noted: 2022-07-19

## 2022-08-24 NOTE — PROGRESS NOTES
"Pulmonary Clinic- Initial Consult    Date of Service: 8/25/22    Referring Physician: Araceli Ferrari M.D.    Reason for Consult: lung nodules    Chief Complaint:   Chief Complaint   Patient presents with    Establish Care     Referred by Dr Ferrari for Multiple lung nodules/Malignant neoplasm of overlapping sites of left female breast    Other     Ct-Chest 02/23/22       HPI:   Fiona Daniels is a 72 y.o. female who is followed by Dr. Ferrari and is referred to the pulmonary clinic for pulmonary nodules.  Patient was diagnosed with breast cancer in 2020 on routine mammogram.  She had a partial mastectomy at that time and has been free of cancer since.  During her work-up of what appears to be a lipoma on her started on she had a CT of her chest and a small nodule was noted in the left lower lobe.  It was initially noted on a CT scan in May 2021 when it measured 3.5 mm, and follow-up in October 2021 it grew to 4.9 mm and then on follow-up in February 2022 it had shrunk to 3.9 mm.  She has no pulmonary symptoms, denies shortness of breath or cough.  She does have a history of asthma which is well controlled on Advair and as needed albuterol which she rarely needs, perhaps once or twice a year.  She also has a history of postnasal drip for which she uses nasal steroids and a daily Allegra.  She is a never smoker, denies a history of acid reflux, denies any prior pulmonary illnesses aside from her asthma.  She does endorse a history of seasonal allergies for which she takes Allegra.  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      Past Medical History:   Diagnosis Date    Anesthesia     mother  \"dead for a minute\"    Arthritis 07/16/2020    hx Gout    ASTHMA     well managed    Back pain     Breast cancer (HCC)     Cancer (HCC)     left partial masecteomy and 5 lymph nodes removed - June/July 2020    Carcinoma of upper-outer quadrant of left breast in female, estrogen receptor positive " (HCC) 08/05/2020    Chickenpox     Thai measles     Hepatitis A     age 6    Hyperlipidemia     Hypertension     Mumps     Pain 07/16/2020    back pain    Painful joint     Positive PPD     Wears glasses        Past Surgical History:   Procedure Laterality Date    PA TOTAL HIP ARTHROPLASTY Right 09/30/2021    Procedure: RIGHT ANTERIOR TOTAL HIP ARTHROPLASTY;  Surgeon: Syd Chapman M.D.;  Location: [facility];  Service: Orthopedics    PA BREAST REDUCTION Right 07/21/2020    Procedure: MAMMOPLASTY, REDUCTION-RIGHT BREAST FOR SYMMETRY;  Surgeon: Steven Schulz M.D.;  Location: [facility];  Service: General    PARTIAL MASTECTOMY Left 07/21/2020    Procedure: MASTECTOMY, PARTIAL- WIRE LOC;  Surgeon: Shy Jean M.D.;  Location: SURGERY [facility];  Service: General    NODE BIOPSY SENTINEL Left 07/21/2020    Procedure: BIOPSY, LYMPH NODE, SENTINEL;  Surgeon: Shy Jean M.D.;  Location: [facility];  Service: General    AXILLARY NODE DISSECTION Left 07/21/2020    Procedure: LYMPHADENECTOMY, AXILLARY- POSS;  Surgeon: Shy Jean M.D.;  Location: [facility];  Service: General    BREAST RECONSTRUCTION Left 07/21/2020    Procedure: RECONSTRUCTION, BREAST-WITH LOCAL TISSUE REARRANGEMENT;  Surgeon: Steven Schulz M.D.;  Location: [facility];  Service: General    OTHER  2014    sinus    ETHMOIDECTOMY  02/14/2012    Performed by GÓMEZ VALDERRAMA at SURGERY SAME DAY [facility] ORS    NASAL POLYPECTOMY  02/14/2012    Performed by GÓMEZ VALDERRAMA at SURGERY SAME DAY [facility] ORS    NASAL SEPTAL RECONSTRUCTION  02/14/2012    Performed by GÓMEZ VALDERRAMA at SURGERY SAME DAY [facility] ORS    HIP REPLACEMENT, TOTAL      OTHER ORTHOPEDIC SURGERY      ORIF right wrist    SINUSCOPE         Social History     Socioeconomic History    Marital status:      Spouse name: Not on file    Number of children: Not on file    Years of education: Not on  file    Highest education level: Not on file   Occupational History    Not on file   Tobacco Use    Smoking status: Never    Smokeless tobacco: Never   Vaping Use    Vaping Use: Never used   Substance and Sexual Activity    Alcohol use: No    Drug use: No    Sexual activity: Not on file   Other Topics Concern    Not on file   Social History Narrative    Not on file     Social Determinants of Health     Financial Resource Strain: Not on file   Food Insecurity: Not on file   Transportation Needs: Not on file   Physical Activity: Not on file   Stress: Not on file   Social Connections: Not on file   Intimate Partner Violence: Not on file   Housing Stability: Not on file          Family History   Problem Relation Age of Onset    Cancer Mother         Rectal Cancer    Hypertension Mother     Heart Disease Father     Heart Attack Father        Current Outpatient Medications on File Prior to Visit   Medication Sig Dispense Refill    metoprolol SR (TOPROL XL) 25 MG TABLET SR 24 HR Take 1 Tablet by mouth every day. 90 Tablet 3    colchicine (COLCRYS) 0.6 MG Tab       anastrozole (ARIMIDEX) 1 MG Tab Take  by mouth every day. Take 1 tablet by mouth once daily      atorvastatin (LIPITOR) 20 MG Tab       trandolapril (MAVIK) 4 MG Tab TK 2 T PO QD  0    hydrochlorothiazide (HYDRODIURIL) 12.5 MG tablet Take 12.5 mg by mouth every day.      albuterol (VENTOLIN OR PROVENTIL) 108 (90 BASE) MCG/ACT AERS Inhale 2 Puffs by mouth every four hours as needed for Shortness of Breath. 1 Inhaler 3    fluticasone-salmeterol (ADVAIR) 100-50 MCG/DOSE AEROSOL POWDER, BREATH ACTIVATED Inhale 1 Puff by mouth every day.      Multiple Vitamins-Minerals (ONE-A-DAY 50 PLUS PO) Take  by mouth every day.      Cholecalciferol (VITAMIN D3 PO) Take  by mouth every day.      CALCIUM PO Take  by mouth every day. Pt does not know dose       No current facility-administered medications on file prior to visit.       Allergies: Aspirin, Codeine, Moxifloxacin hcl  "in nacl, and Robitussin dm [guiatuss dm]      ROS:   Review of Systems   Constitutional:  Negative for chills, fever and weight loss.   HENT:  Positive for congestion.    Eyes:  Negative for blurred vision.   Respiratory:  Positive for shortness of breath and wheezing. Negative for cough, hemoptysis and sputum production.    Cardiovascular:  Positive for leg swelling. Negative for chest pain and palpitations.   Gastrointestinal:  Negative for heartburn.   Genitourinary:  Negative for dysuria.   Neurological:  Negative for dizziness and headaches.   Endo/Heme/Allergies:  Positive for environmental allergies.     Vitals:  BP (!) 152/74 (BP Location: Left arm, Patient Position: Sitting, BP Cuff Size: Adult)   Pulse 71   Ht 1.676 m (5' 6\")   Wt 95.7 kg (211 lb)   SpO2 92%     Physical Exam:  Physical Exam  Constitutional:       Appearance: Normal appearance.   HENT:      Head: Atraumatic.      Mouth/Throat:      Mouth: Mucous membranes are dry.   Eyes:      Extraocular Movements: Extraocular movements intact.   Cardiovascular:      Rate and Rhythm: Normal rate and regular rhythm.      Heart sounds: Normal heart sounds.   Pulmonary:      Effort: Pulmonary effort is normal. No respiratory distress.      Breath sounds: Normal breath sounds. No wheezing or rales.   Abdominal:      Palpations: Abdomen is soft.   Musculoskeletal:         General: No swelling, tenderness or deformity.   Skin:     General: Skin is warm and dry.   Neurological:      General: No focal deficit present.      Mental Status: She is alert and oriented to person, place, and time.       Laboratory Data:      Pertinent Studies:    PFTs as reviewed by me personally show:    Imaging as reviewed by me personally show:    2/23/22  IMPRESSION:     1.  Stable appearance of the medial end of the right clavicle with no osseous erosion or destruction. No soft tissue abnormality at the site of metallic marker.  2.  Stable 0.4 cm nodule in the posterior, " superior left lower lobe. Recommend follow-up per oncology guidelines.  3.  Postsurgical change of the left breast.  4.  Unchanged wedge compression deformity of L1.  Echo:      Assessment/Plan:    Problem List Items Addressed This Visit       Mild asthma     Well controlled on Advair. Stable and chronic.   Plan:  - Continue advair daily with PRN albuterol  - management per primary           Pulmonary nodules     3.9 mm left lower lobe nodule has grown and then shrunk over the past year.  She has a history of breast CA in remission s/p partial mastectomy in 2020.    Plan:  - CT scan in 6 months (1 year from prior)  - Follow up in 6 months after CT, likely will not need further imaging after that if stable         Relevant Orders    CT-CHEST LOW DOSE W/O        Return in about 6 months (around 2/25/2023).     This note was generated using voice recognition software which has a chance of producing errors of grammar and possibly content.  I have made every reasonable attempt to find and correct any obvious errors, but it should be expected that some may not be found prior to finalization of this note.    Time spent in record review prior to patient arrival, reviewing results, and in face-to-face encounter totaled 45 min, excluding any procedures if performed.      Carloyne Jaimes MD RD  Pulmonary and Critical Care Medicine  [facility]

## 2022-08-25 ENCOUNTER — OFFICE VISIT (OUTPATIENT)
Dept: SLEEP MEDICINE | Facility: MEDICAL CENTER | Age: 72
End: 2022-08-25
Payer: MEDICARE

## 2022-08-25 VITALS
OXYGEN SATURATION: 92 % | HEIGHT: 66 IN | SYSTOLIC BLOOD PRESSURE: 152 MMHG | BODY MASS INDEX: 33.91 KG/M2 | HEART RATE: 71 BPM | WEIGHT: 211 LBS | DIASTOLIC BLOOD PRESSURE: 74 MMHG

## 2022-08-25 DIAGNOSIS — J45.20 MILD INTERMITTENT ASTHMA WITHOUT COMPLICATION: ICD-10-CM

## 2022-08-25 DIAGNOSIS — R91.8 PULMONARY NODULES: ICD-10-CM

## 2022-08-25 PROCEDURE — 99204 OFFICE O/P NEW MOD 45 MIN: CPT | Performed by: INTERNAL MEDICINE

## 2022-08-25 ASSESSMENT — ENCOUNTER SYMPTOMS
HEADACHES: 0
CHEST TIGHTNESS: 0
BLURRED VISION: 0
RESPIRATORY SYMPTOMS COMMENTS: SOMETIMES
DYSPNEA AT REST: 0
COUGH: 0
CHILLS: 0
PALPITATIONS: 0
WHEEZING: 1
WEIGHT LOSS: 0
HEMOPTYSIS: 0
SHORTNESS OF BREATH: 1
SPUTUM PRODUCTION: 0
HEARTBURN: 0
DIZZINESS: 0
FEVER: 0

## 2022-08-25 ASSESSMENT — PATIENT HEALTH QUESTIONNAIRE - PHQ9: CLINICAL INTERPRETATION OF PHQ2 SCORE: 0

## 2022-08-25 ASSESSMENT — FIBROSIS 4 INDEX: FIB4 SCORE: 1.74

## 2022-08-25 NOTE — ASSESSMENT & PLAN NOTE
3.9 mm left lower lobe nodule has grown and then shrunk over the past year.  She has a history of breast CA in remission s/p partial mastectomy in 2020.    Plan:  - CT scan in 6 months (1 year from prior)  - Follow up in 6 months after CT, likely will not need further imaging after that if stable

## 2022-08-25 NOTE — ASSESSMENT & PLAN NOTE
Well controlled on Advair. Stable and chronic.   Plan:  - Continue advair daily with PRN albuterol  - management per primary

## 2022-09-28 ENCOUNTER — HOSPITAL ENCOUNTER (OUTPATIENT)
Dept: LAB | Facility: MEDICAL CENTER | Age: 72
End: 2022-09-28
Attending: FAMILY MEDICINE
Payer: MEDICARE

## 2022-09-28 LAB
25(OH)D3 SERPL-MCNC: 52 NG/ML (ref 30–100)
ALBUMIN SERPL BCP-MCNC: 3.8 G/DL (ref 3.2–4.9)
ALBUMIN/GLOB SERPL: 1.5 G/DL
ALP SERPL-CCNC: 80 U/L (ref 30–99)
ALT SERPL-CCNC: 15 U/L (ref 2–50)
ANION GAP SERPL CALC-SCNC: 12 MMOL/L (ref 7–16)
AST SERPL-CCNC: 21 U/L (ref 12–45)
BASOPHILS # BLD AUTO: 0.6 % (ref 0–1.8)
BASOPHILS # BLD: 0.03 K/UL (ref 0–0.12)
BILIRUB SERPL-MCNC: 1.1 MG/DL (ref 0.1–1.5)
BUN SERPL-MCNC: 16 MG/DL (ref 8–22)
CALCIUM SERPL-MCNC: 9.6 MG/DL (ref 8.5–10.5)
CHLORIDE SERPL-SCNC: 102 MMOL/L (ref 96–112)
CHOLEST SERPL-MCNC: 126 MG/DL (ref 100–199)
CO2 SERPL-SCNC: 27 MMOL/L (ref 20–33)
CREAT SERPL-MCNC: 0.76 MG/DL (ref 0.5–1.4)
EOSINOPHIL # BLD AUTO: 0.18 K/UL (ref 0–0.51)
EOSINOPHIL NFR BLD: 3.5 % (ref 0–6.9)
ERYTHROCYTE [DISTWIDTH] IN BLOOD BY AUTOMATED COUNT: 49.7 FL (ref 35.9–50)
EST. AVERAGE GLUCOSE BLD GHB EST-MCNC: 120 MG/DL
FASTING STATUS PATIENT QL REPORTED: NORMAL
GFR SERPLBLD CREATININE-BSD FMLA CKD-EPI: 83 ML/MIN/1.73 M 2
GLOBULIN SER CALC-MCNC: 2.6 G/DL (ref 1.9–3.5)
GLUCOSE SERPL-MCNC: 102 MG/DL (ref 65–99)
HBA1C MFR BLD: 5.8 % (ref 4–5.6)
HCT VFR BLD AUTO: 41.2 % (ref 37–47)
HDLC SERPL-MCNC: 36 MG/DL
HGB BLD-MCNC: 13.9 G/DL (ref 12–16)
IMM GRANULOCYTES # BLD AUTO: 0.01 K/UL (ref 0–0.11)
IMM GRANULOCYTES NFR BLD AUTO: 0.2 % (ref 0–0.9)
LDLC SERPL CALC-MCNC: 66 MG/DL
LYMPHOCYTES # BLD AUTO: 1.1 K/UL (ref 1–4.8)
LYMPHOCYTES NFR BLD: 21.3 % (ref 22–41)
MCH RBC QN AUTO: 33.3 PG (ref 27–33)
MCHC RBC AUTO-ENTMCNC: 33.7 G/DL (ref 33.6–35)
MCV RBC AUTO: 98.8 FL (ref 81.4–97.8)
MONOCYTES # BLD AUTO: 0.41 K/UL (ref 0–0.85)
MONOCYTES NFR BLD AUTO: 7.9 % (ref 0–13.4)
NEUTROPHILS # BLD AUTO: 3.43 K/UL (ref 2–7.15)
NEUTROPHILS NFR BLD: 66.5 % (ref 44–72)
NRBC # BLD AUTO: 0 K/UL
NRBC BLD-RTO: 0 /100 WBC
PLATELET # BLD AUTO: 230 K/UL (ref 164–446)
PMV BLD AUTO: 10.7 FL (ref 9–12.9)
POTASSIUM SERPL-SCNC: 3.9 MMOL/L (ref 3.6–5.5)
PROT SERPL-MCNC: 6.4 G/DL (ref 6–8.2)
RBC # BLD AUTO: 4.17 M/UL (ref 4.2–5.4)
SODIUM SERPL-SCNC: 141 MMOL/L (ref 135–145)
TRIGL SERPL-MCNC: 118 MG/DL (ref 0–149)
WBC # BLD AUTO: 5.2 K/UL (ref 4.8–10.8)

## 2022-09-28 PROCEDURE — 82306 VITAMIN D 25 HYDROXY: CPT

## 2022-09-28 PROCEDURE — 83036 HEMOGLOBIN GLYCOSYLATED A1C: CPT | Mod: GA

## 2022-09-28 PROCEDURE — 80053 COMPREHEN METABOLIC PANEL: CPT

## 2022-09-28 PROCEDURE — 36415 COLL VENOUS BLD VENIPUNCTURE: CPT

## 2022-09-28 PROCEDURE — 85025 COMPLETE CBC W/AUTO DIFF WBC: CPT

## 2022-09-28 PROCEDURE — 80061 LIPID PANEL: CPT | Mod: GA

## 2022-11-08 ENCOUNTER — APPOINTMENT (OUTPATIENT)
Dept: RADIOLOGY | Facility: MEDICAL CENTER | Age: 72
End: 2022-11-08
Attending: INTERNAL MEDICINE
Payer: MEDICARE

## 2022-11-09 ENCOUNTER — PATIENT MESSAGE (OUTPATIENT)
Dept: HEALTH INFORMATION MANAGEMENT | Facility: OTHER | Age: 72
End: 2022-11-09

## 2022-11-10 ENCOUNTER — HOSPITAL ENCOUNTER (OUTPATIENT)
Dept: RADIOLOGY | Facility: MEDICAL CENTER | Age: 72
End: 2022-11-10
Attending: INTERNAL MEDICINE
Payer: MEDICARE

## 2022-11-10 DIAGNOSIS — M81.0 OSTEOPOROSIS, UNSPECIFIED OSTEOPOROSIS TYPE, UNSPECIFIED PATHOLOGICAL FRACTURE PRESENCE: ICD-10-CM

## 2022-11-10 PROCEDURE — 77080 DXA BONE DENSITY AXIAL: CPT

## 2022-11-14 ENCOUNTER — TELEPHONE (OUTPATIENT)
Dept: CARDIOLOGY | Facility: MEDICAL CENTER | Age: 72
End: 2022-11-14
Payer: MEDICARE

## 2022-11-14 DIAGNOSIS — I10 ESSENTIAL HYPERTENSION: ICD-10-CM

## 2022-11-14 RX ORDER — METOPROLOL SUCCINATE 25 MG/1
25 TABLET, EXTENDED RELEASE ORAL
Qty: 90 TABLET | Refills: 0 | Status: SHIPPED | OUTPATIENT
Start: 2022-11-14 | End: 2022-11-14

## 2022-11-14 RX ORDER — METOPROLOL SUCCINATE 25 MG/1
25 TABLET, EXTENDED RELEASE ORAL
Qty: 90 TABLET | Refills: 0 | Status: SHIPPED
Start: 2022-11-14 | End: 2023-02-15 | Stop reason: SDUPTHER

## 2022-11-14 RX ORDER — METOPROLOL SUCCINATE 25 MG/1
25 TABLET, EXTENDED RELEASE ORAL
Qty: 90 TABLET | Refills: 0 | Status: SHIPPED
Start: 2022-11-14 | End: 2022-11-14

## 2022-11-14 NOTE — TELEPHONE ENCOUNTER
"DRE    Caller: Fiona Daniels    Topic/issue: METOPROLOL 25MG    Patient states that she is needing a \"hard copy\" script to take to another pharamcy. Patient states that she will be in the office around 2pm to pick this script up before she leaves town. Please advise.    Thank you,  Raul MCCORMICK    Callback Number: [phone] (home)       "

## 2022-11-14 NOTE — TELEPHONE ENCOUNTER
Phone Number Called: [phone]    Call outcome: Left detailed message for patient. Informed to call back with any additional questions.    Message: Called to inform patient that script has been signed and placed at  for patient to .

## 2022-11-22 PROBLEM — M79.671 PAIN IN RIGHT FOOT: Status: ACTIVE | Noted: 2022-11-22

## 2022-11-22 PROBLEM — M25.571 PAIN IN RIGHT ANKLE: Status: ACTIVE | Noted: 2022-11-22

## 2022-12-29 ENCOUNTER — HOSPITAL ENCOUNTER (OUTPATIENT)
Dept: LAB | Facility: MEDICAL CENTER | Age: 72
End: 2022-12-29
Attending: FAMILY MEDICINE
Payer: MEDICARE

## 2022-12-29 LAB
ANION GAP SERPL CALC-SCNC: 9 MMOL/L (ref 7–16)
BASOPHILS # BLD AUTO: 0.5 % (ref 0–1.8)
BASOPHILS # BLD: 0.04 K/UL (ref 0–0.12)
BUN SERPL-MCNC: 18 MG/DL (ref 8–22)
CALCIUM SERPL-MCNC: 9.7 MG/DL (ref 8.5–10.5)
CHLORIDE SERPL-SCNC: 103 MMOL/L (ref 96–112)
CO2 SERPL-SCNC: 28 MMOL/L (ref 20–33)
CREAT SERPL-MCNC: 0.72 MG/DL (ref 0.5–1.4)
EOSINOPHIL # BLD AUTO: 0.12 K/UL (ref 0–0.51)
EOSINOPHIL NFR BLD: 1.6 % (ref 0–6.9)
ERYTHROCYTE [DISTWIDTH] IN BLOOD BY AUTOMATED COUNT: 48.9 FL (ref 35.9–50)
EST. AVERAGE GLUCOSE BLD GHB EST-MCNC: 126 MG/DL
GFR SERPLBLD CREATININE-BSD FMLA CKD-EPI: 88 ML/MIN/1.73 M 2
GLUCOSE SERPL-MCNC: 101 MG/DL (ref 65–99)
HBA1C MFR BLD: 6 % (ref 4–5.6)
HCT VFR BLD AUTO: 43.3 % (ref 37–47)
HGB BLD-MCNC: 13.9 G/DL (ref 12–16)
IMM GRANULOCYTES # BLD AUTO: 0.04 K/UL (ref 0–0.11)
IMM GRANULOCYTES NFR BLD AUTO: 0.5 % (ref 0–0.9)
LYMPHOCYTES # BLD AUTO: 1.79 K/UL (ref 1–4.8)
LYMPHOCYTES NFR BLD: 23.9 % (ref 22–41)
MCH RBC QN AUTO: 32.5 PG (ref 27–33)
MCHC RBC AUTO-ENTMCNC: 32.1 G/DL (ref 33.6–35)
MCV RBC AUTO: 101.2 FL (ref 81.4–97.8)
MONOCYTES # BLD AUTO: 0.43 K/UL (ref 0–0.85)
MONOCYTES NFR BLD AUTO: 5.7 % (ref 0–13.4)
NEUTROPHILS # BLD AUTO: 5.06 K/UL (ref 2–7.15)
NEUTROPHILS NFR BLD: 67.8 % (ref 44–72)
NRBC # BLD AUTO: 0 K/UL
NRBC BLD-RTO: 0 /100 WBC
PLATELET # BLD AUTO: 275 K/UL (ref 164–446)
PMV BLD AUTO: 10 FL (ref 9–12.9)
POTASSIUM SERPL-SCNC: 4.1 MMOL/L (ref 3.6–5.5)
RBC # BLD AUTO: 4.28 M/UL (ref 4.2–5.4)
SODIUM SERPL-SCNC: 140 MMOL/L (ref 135–145)
URATE SERPL-MCNC: 7.3 MG/DL (ref 1.9–8.2)
WBC # BLD AUTO: 7.5 K/UL (ref 4.8–10.8)

## 2022-12-29 PROCEDURE — 80048 BASIC METABOLIC PNL TOTAL CA: CPT

## 2022-12-29 PROCEDURE — 85025 COMPLETE CBC W/AUTO DIFF WBC: CPT

## 2022-12-29 PROCEDURE — 84550 ASSAY OF BLOOD/URIC ACID: CPT

## 2022-12-29 PROCEDURE — 36415 COLL VENOUS BLD VENIPUNCTURE: CPT

## 2022-12-29 PROCEDURE — 83036 HEMOGLOBIN GLYCOSYLATED A1C: CPT | Mod: GA

## 2023-02-06 ENCOUNTER — APPOINTMENT (RX ONLY)
Dept: URBAN - METROPOLITAN AREA CLINIC 6 | Facility: CLINIC | Age: 73
Setting detail: DERMATOLOGY
End: 2023-02-06

## 2023-02-06 DIAGNOSIS — Z71.89 OTHER SPECIFIED COUNSELING: ICD-10-CM

## 2023-02-06 DIAGNOSIS — D22 MELANOCYTIC NEVI: ICD-10-CM

## 2023-02-06 DIAGNOSIS — L82.1 OTHER SEBORRHEIC KERATOSIS: ICD-10-CM

## 2023-02-06 DIAGNOSIS — L57.0 ACTINIC KERATOSIS: ICD-10-CM

## 2023-02-06 DIAGNOSIS — D18.0 HEMANGIOMA: ICD-10-CM

## 2023-02-06 DIAGNOSIS — L81.4 OTHER MELANIN HYPERPIGMENTATION: ICD-10-CM

## 2023-02-06 DIAGNOSIS — L82.0 INFLAMED SEBORRHEIC KERATOSIS: ICD-10-CM

## 2023-02-06 PROBLEM — D18.01 HEMANGIOMA OF SKIN AND SUBCUTANEOUS TISSUE: Status: ACTIVE | Noted: 2023-02-06

## 2023-02-06 PROBLEM — D22.5 MELANOCYTIC NEVI OF TRUNK: Status: ACTIVE | Noted: 2023-02-06

## 2023-02-06 PROCEDURE — ? SUNSCREEN TREATMENT REGIMEN

## 2023-02-06 PROCEDURE — ? COUNSELING

## 2023-02-06 PROCEDURE — ? LIQUID NITROGEN

## 2023-02-06 PROCEDURE — 17110 DESTRUCTION B9 LES UP TO 14: CPT

## 2023-02-06 PROCEDURE — 17003 DESTRUCT PREMALG LES 2-14: CPT | Mod: 59

## 2023-02-06 PROCEDURE — ? SUNSCREEN RECOMMENDATIONS

## 2023-02-06 PROCEDURE — 99213 OFFICE O/P EST LOW 20 MIN: CPT | Mod: 25

## 2023-02-06 PROCEDURE — 17000 DESTRUCT PREMALG LESION: CPT | Mod: 59

## 2023-02-06 ASSESSMENT — LOCATION SIMPLE DESCRIPTION DERM
LOCATION SIMPLE: LEFT ANTERIOR NECK
LOCATION SIMPLE: LEFT CHEEK
LOCATION SIMPLE: CHEST
LOCATION SIMPLE: RIGHT SHOULDER
LOCATION SIMPLE: LEFT HAND
LOCATION SIMPLE: RIGHT BREAST
LOCATION SIMPLE: RIGHT UPPER ARM
LOCATION SIMPLE: LEFT POPLITEAL SKIN
LOCATION SIMPLE: ABDOMEN
LOCATION SIMPLE: RIGHT HAND
LOCATION SIMPLE: LEFT NOSE
LOCATION SIMPLE: NOSE

## 2023-02-06 ASSESSMENT — LOCATION DETAILED DESCRIPTION DERM
LOCATION DETAILED: RIGHT ULNAR DORSAL HAND
LOCATION DETAILED: RIGHT RADIAL DORSAL HAND
LOCATION DETAILED: LEFT CLAVICULAR NECK
LOCATION DETAILED: EPIGASTRIC SKIN
LOCATION DETAILED: PERIUMBILICAL SKIN
LOCATION DETAILED: RIGHT ANTERIOR PROXIMAL UPPER ARM
LOCATION DETAILED: LEFT POPLITEAL SKIN
LOCATION DETAILED: LEFT ULNAR DORSAL HAND
LOCATION DETAILED: NASAL TIP
LOCATION DETAILED: LEFT INFERIOR MEDIAL MALAR CHEEK
LOCATION DETAILED: LEFT RADIAL DORSAL HAND
LOCATION DETAILED: RIGHT POSTERIOR SHOULDER
LOCATION DETAILED: RIGHT MEDIAL SUPERIOR CHEST
LOCATION DETAILED: RIGHT MEDIAL BREAST 4-5:00 REGION
LOCATION DETAILED: LEFT NASAL ALA

## 2023-02-06 ASSESSMENT — LOCATION ZONE DERM
LOCATION ZONE: NECK
LOCATION ZONE: LEG
LOCATION ZONE: NOSE
LOCATION ZONE: FACE
LOCATION ZONE: TRUNK
LOCATION ZONE: HAND
LOCATION ZONE: ARM

## 2023-02-06 NOTE — PROCEDURE: LIQUID NITROGEN
Detail Level: Detailed
Consent: The patient's consent was obtained including but not limited to risks of crusting, scabbing, blistering, scarring, darker or lighter pigmentary change, recurrence, incomplete removal and infection.
Duration Of Freeze Thaw-Cycle (Seconds): 10
Post-Care Instructions: I reviewed with the patient in detail post-care instructions. Patient is to wear sunprotection, and avoid picking at any of the treated lesions. Pt may apply Vaseline to crusted or scabbing areas.
Render Note In Bullet Format When Appropriate: No
Show Applicator Variable?: Yes
Number Of Freeze-Thaw Cycles: 2 freeze-thaw cycles
Spray Paint Text: The liquid nitrogen was applied to the skin utilizing a spray paint frosting technique.
Duration Of Freeze Thaw-Cycle (Seconds): 10-15
Medical Necessity Information: It is in your best interest to select a reason for this procedure from the list below. All of these items fulfill various CMS LCD requirements except the new and changing color options.
Medical Necessity Clause: This procedure was medically necessary because the lesions that were treated were:
Number Of Freeze-Thaw Cycles: 3 freeze-thaw cycles

## 2023-02-15 ENCOUNTER — OFFICE VISIT (OUTPATIENT)
Dept: CARDIOLOGY | Facility: MEDICAL CENTER | Age: 73
End: 2023-02-15
Payer: MEDICARE

## 2023-02-15 VITALS
HEIGHT: 66 IN | HEART RATE: 88 BPM | SYSTOLIC BLOOD PRESSURE: 136 MMHG | BODY MASS INDEX: 33.75 KG/M2 | OXYGEN SATURATION: 95 % | RESPIRATION RATE: 14 BRPM | DIASTOLIC BLOOD PRESSURE: 80 MMHG | WEIGHT: 210 LBS

## 2023-02-15 DIAGNOSIS — I10 ESSENTIAL HYPERTENSION: ICD-10-CM

## 2023-02-15 DIAGNOSIS — Z86.79 HISTORY OF SINUS TACHYCARDIA: ICD-10-CM

## 2023-02-15 DIAGNOSIS — E78.2 MIXED HYPERLIPIDEMIA: ICD-10-CM

## 2023-02-15 DIAGNOSIS — I65.23 ATHEROSCLEROSIS OF BOTH CAROTID ARTERIES: ICD-10-CM

## 2023-02-15 PROCEDURE — 99214 OFFICE O/P EST MOD 30 MIN: CPT | Performed by: INTERNAL MEDICINE

## 2023-02-15 RX ORDER — METOPROLOL SUCCINATE 25 MG/1
25 TABLET, EXTENDED RELEASE ORAL
Qty: 90 TABLET | Refills: 3 | Status: SHIPPED | OUTPATIENT
Start: 2023-02-15 | End: 2023-02-15

## 2023-02-15 RX ORDER — TRANDOLAPRIL TABLETS 4 MG/1
4 TABLET ORAL DAILY
Qty: 180 TABLET | Refills: 3 | Status: SHIPPED | OUTPATIENT
Start: 2023-02-15 | End: 2023-02-15

## 2023-02-15 RX ORDER — METOPROLOL SUCCINATE 25 MG/1
25 TABLET, EXTENDED RELEASE ORAL
Qty: 90 TABLET | Refills: 3 | Status: SHIPPED
Start: 2023-02-15 | End: 2023-07-12 | Stop reason: SDUPTHER

## 2023-02-15 RX ORDER — TRANDOLAPRIL TABLETS 4 MG/1
4 TABLET ORAL DAILY
Qty: 180 TABLET | Refills: 3 | Status: SHIPPED
Start: 2023-02-15 | End: 2023-02-15

## 2023-02-15 RX ORDER — TRANDOLAPRIL TABLETS 4 MG/1
4 TABLET ORAL DAILY
Qty: 180 TABLET | Refills: 3 | Status: SHIPPED | OUTPATIENT
Start: 2023-02-15 | End: 2023-04-07 | Stop reason: SDUPTHER

## 2023-02-15 RX ORDER — ATORVASTATIN CALCIUM 20 MG/1
20 TABLET, FILM COATED ORAL DAILY
Qty: 90 TABLET | Refills: 3 | Status: SHIPPED | OUTPATIENT
Start: 2023-02-15 | End: 2023-02-15

## 2023-02-15 RX ORDER — ATORVASTATIN CALCIUM 20 MG/1
20 TABLET, FILM COATED ORAL DAILY
Qty: 90 TABLET | Refills: 3 | Status: SHIPPED
Start: 2023-02-15 | End: 2024-01-25

## 2023-02-15 ASSESSMENT — ENCOUNTER SYMPTOMS
CHILLS: 0
MUSCULOSKELETAL NEGATIVE: 1
CONSTITUTIONAL NEGATIVE: 1
FEVER: 0
COUGH: 0
DIZZINESS: 0
VOMITING: 0
NERVOUS/ANXIOUS: 0
NEUROLOGICAL NEGATIVE: 1
ABDOMINAL PAIN: 0
DEPRESSION: 0
PALPITATIONS: 0
NAUSEA: 0
EYES NEGATIVE: 1
WEIGHT LOSS: 0
CLAUDICATION: 0
HEADACHES: 0
BLURRED VISION: 0
DOUBLE VISION: 0
BRUISES/BLEEDS EASILY: 0
FOCAL WEAKNESS: 0
SHORTNESS OF BREATH: 0
GASTROINTESTINAL NEGATIVE: 1
RESPIRATORY NEGATIVE: 1
CARDIOVASCULAR NEGATIVE: 1
PSYCHIATRIC NEGATIVE: 1
MYALGIAS: 0
WEAKNESS: 0

## 2023-02-15 ASSESSMENT — FIBROSIS 4 INDEX: FIB4 SCORE: 1.44

## 2023-02-15 NOTE — PROGRESS NOTES
"Chief Complaint   Patient presents with    Hyperlipidemia     F/V Dx: Mixed hyperlipidemia       Subjective     Fiona Daniels is a 72 y.o. female who presents today for annual follow up of hypertension and hyperlipidemia .    Since the patient's last visit on 01/19/22, she has been doing well clinically. She denies chest pain, shortness of breath, palpitations, nausea/vomiting or diaphoresis. She has been inactive following her foot surgery and has gained some weight. 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    Past Medical History:   Diagnosis Date    Anesthesia     mother  \"dead for a minute\"    Arthritis 07/16/2020    hx Gout    ASTHMA     well managed    Back pain     Breast cancer (HCC)     Cancer (HCC)     left partial masecteomy and 5 lymph nodes removed - June/July 2020    Carcinoma of upper-outer quadrant of left breast in female, estrogen receptor positive (HCC) 08/05/2020    Chickenpox     Prydeinig measles     Hepatitis A     age 6    High cholesterol     Hyperlipidemia     Hypertension     Mumps     Pain 07/16/2020    back pain    Painful joint     Positive PPD     Wears glasses      Past Surgical History:   Procedure Laterality Date    PB EXPLOR TARSAL/TARSOMETATAR JT Right 12/19/2022    Procedure: RIGHT FIRST INTERPHALANGEAL JOINT DEBRIDEMENT, RIGHT EXCISION GOUT, REPAIRS AS INDICATED;  Surgeon: Bert Mo M.D.;  Location: [facility] [place];  Service: Orthopedics    PB FUSION BIG TOE,MT-P JT Right 9/14/2022    Procedure: RIGHT FIRST METATARSOPHALANGEAL JOINT FUSION;  Surgeon: Bert Mo M.D.;  Location: [facility];  Service: Orthopedics    SC EXCISION TUMOR SOFT TISSUE FOOT/TOE SUBQ * Right 9/14/2022    Procedure: RIGHT FIRST METATARSOPHALANGEAL JOINT EXCISION, RIGHT GREAT TOE EXCISION, REPAIRS AS INDICATED;  Surgeon: Bert Mo M.D.;  Location: [facility] [place];  Service: Orthopedics    SC TOTAL HIP ARTHROPLASTY Right " 09/30/2021    Procedure: RIGHT ANTERIOR TOTAL HIP ARTHROPLASTY;  Surgeon: Syd Chapman M.D.;  Location: [facility];  Service: Orthopedics    AL BREAST REDUCTION Right 07/21/2020    Procedure: MAMMOPLASTY, REDUCTION-RIGHT BREAST FOR SYMMETRY;  Surgeon: Steven Schulz M.D.;  Location: [facility];  Service: General    PARTIAL MASTECTOMY Left 07/21/2020    Procedure: MASTECTOMY, PARTIAL- WIRE LOC;  Surgeon: Shy Jean M.D.;  Location: [facility];  Service: General    NODE BIOPSY SENTINEL Left 07/21/2020    Procedure: BIOPSY, LYMPH NODE, SENTINEL;  Surgeon: Shy Jean M.D.;  Location: [facility];  Service: General    AXILLARY NODE DISSECTION Left 07/21/2020    Procedure: LYMPHADENECTOMY, AXILLARY- POSS;  Surgeon: Shy Jean M.D.;  Location: [facility];  Service: General    BREAST RECONSTRUCTION Left 07/21/2020    Procedure: RECONSTRUCTION, BREAST-WITH LOCAL TISSUE REARRANGEMENT;  Surgeon: Steven Schulz M.D.;  Location: [facility];  Service: General    OTHER  2014    sinus    ETHMOIDECTOMY  02/14/2012    Performed by GÓMEZ VALDERRAMA at SURGERY SAME DAY [facility] ORS    NASAL POLYPECTOMY  02/14/2012    Performed by GÓMEZ VALDERRAMA at SURGERY SAME DAY [facility] ORS    NASAL SEPTAL RECONSTRUCTION  02/14/2012    Performed by GÓMEZ VALDERRAMA at SURGERY SAME DAY [facility]    HIP REPLACEMENT, TOTAL      OTHER ORTHOPEDIC SURGERY      ORIF right wrist    SINUSCOPE       Family History   Problem Relation Age of Onset    Cancer Mother         Rectal Cancer    Hypertension Mother     Heart Disease Father     Heart Attack Father      Social History     Socioeconomic History    Marital status:      Spouse name: Not on file    Number of children: Not on file    Years of education: Not on file    Highest education level: Not on file   Occupational History    Not on file   Tobacco Use    Smoking status: Never    Smokeless  "tobacco: Never   Vaping Use    Vaping Use: Never used   Substance and Sexual Activity    Alcohol use: No    Drug use: No    Sexual activity: Not on file   Other Topics Concern    Not on file   Social History Narrative    Not on file     Social Determinants of Health     Financial Resource Strain: Not on file   Food Insecurity: Not on file   Transportation Needs: Not on file   Physical Activity: Not on file   Stress: Not on file   Social Connections: Not on file   Intimate Partner Violence: Not on file   Housing Stability: Not on file     Allergies   Allergen Reactions    Aspirin Anaphylaxis    Codeine      \"passed out\"    Moxifloxacin Hcl In Nacl Rash    Robitussin Dm [Guiatuss Dm] Anaphylaxis     (Medications reviewed.)   Outpatient Encounter Medications as of 2/15/2023   Medication Sig Dispense Refill    trandolapril (MAVIK) 4 MG Tab Take 1 Tablet by mouth every day. 180 Tablet 3    atorvastatin (LIPITOR) 20 MG Tab Take 1 Tablet by mouth every day. 90 Tablet 3    metoprolol SR (TOPROL XL) 25 MG TABLET SR 24 HR Take 1 Tablet by mouth every day. 90 Tablet 3    colchicine (COLCRYS) 0.6 MG Tab Take 2 tablets by mouth  then 6 hours later take 1 tablet by mouth 3 Tablet 0    anastrozole (ARIMIDEX) 1 MG Tab Take  by mouth every day. Take 1 tablet by mouth once daily      albuterol (VENTOLIN OR PROVENTIL) 108 (90 BASE) MCG/ACT AERS Inhale 2 Puffs by mouth every four hours as needed for Shortness of Breath. 1 Inhaler 3    fluticasone-salmeterol (ADVAIR) 100-50 MCG/DOSE AEROSOL POWDER, BREATH ACTIVATED Inhale 1 Puff by mouth every day.      Multiple Vitamins-Minerals (ONE-A-DAY 50 PLUS PO) Take  by mouth every day.      Cholecalciferol (VITAMIN D3 PO) Take  by mouth every day.      CALCIUM PO Take  by mouth every day. Pt does not know dose      [DISCONTINUED] hydrOXYzine pamoate (VISTARIL) 50 MG Cap Take 1 Capsule by mouth 3 times a day as needed for Itching or Other (nausea, anxiety, insomnia). (Patient not taking: " "Reported on 2/15/2023) 20 Capsule 1    [DISCONTINUED] gabapentin (NEURONTIN) 300 MG Cap Take 1 po qhs (Patient not taking: Reported on 2/15/2023) 7 Capsule 0    [DISCONTINUED] acetaminophen (TYLENOL) 500 MG Tab Take 2 tabs up to 3 times a day prn pain (Patient not taking: Reported on 2/15/2023) 60 Tablet 0    [DISCONTINUED] metoprolol SR (TOPROL XL) 25 MG TABLET SR 24 HR Take 1 Tablet by mouth every day. 90 Tablet 0    [DISCONTINUED] atorvastatin (LIPITOR) 20 MG Tab       [DISCONTINUED] trandolapril (MAVIK) 4 MG Tab TK 2 T PO QD  0     No facility-administered encounter medications on file as of 2/15/2023.     Review of Systems   Constitutional: Negative.  Negative for chills, fever, malaise/fatigue and weight loss.   HENT: Negative.  Negative for hearing loss.    Eyes: Negative.  Negative for blurred vision and double vision.   Respiratory: Negative.  Negative for cough and shortness of breath.    Cardiovascular: Negative.  Negative for chest pain, palpitations, claudication and leg swelling.   Gastrointestinal: Negative.  Negative for abdominal pain, nausea and vomiting.   Genitourinary: Negative.  Negative for dysuria and urgency.   Musculoskeletal: Negative.  Negative for joint pain and myalgias.   Skin: Negative.  Negative for itching and rash.   Neurological: Negative.  Negative for dizziness, focal weakness, weakness and headaches.   Endo/Heme/Allergies: Negative.  Does not bruise/bleed easily.   Psychiatric/Behavioral: Negative.  Negative for depression. The patient is not nervous/anxious.             Objective     /80 (BP Location: Left arm, Patient Position: Sitting, BP Cuff Size: Adult)   Pulse 88   Resp 14   Ht 1.676 m (5' 6\")   Wt 95.3 kg (210 lb)   SpO2 95%   BMI 33.89 kg/m²     Physical Exam  Constitutional:       Appearance: Normal appearance. She is well-developed and normal weight.   HENT:      Head: Normocephalic and atraumatic.   Neck:      Vascular: No JVD.   Cardiovascular:      " Rate and Rhythm: Normal rate and regular rhythm.      Heart sounds: Normal heart sounds.   Pulmonary:      Effort: Pulmonary effort is normal.      Breath sounds: Normal breath sounds.   Abdominal:      General: Bowel sounds are normal.      Palpations: Abdomen is soft.      Comments: No hepatosplenomegaly.   Musculoskeletal:         General: Normal range of motion.   Lymphadenopathy:      Cervical: No cervical adenopathy.   Skin:     General: Skin is warm and dry.   Neurological:      Mental Status: She is alert and oriented to person, place, and time.          CARDIAC STUDIES/PROCEDURES:     CAROTID ULTRASOUND (01/10/19)  1.  There is a mild amount of atherosclerotic plaque.  Plaque is located in carotid bulbs and proximal internal carotid arteries.  Plaque characterization:  calcific  2.  There is no evidence of carotid occlusion.  3. Vertebral arteries demonstrate antegrade flow.  4. Diameter reduction in the internal carotid arteries: less than 50%. There is no hemodynamically significant stenosis.     ECHOCARDIOGRAM CONCLUSIONS (11/01/19)  No prior study is available for comparison.   Hyperdynamic left ventricular systolic function.  Left ventricular ejection fraction is visually estimated to be greater than 75%.  No significant valve abnormality.  Unable to estimate RVSP, normal RAP.     EKG performed on (07/16/20) EKG shows sinus rhythm.     HOLTER MONITOR (10/23/19)  48 Hour Holter Summary:   Sinus, normal heart rate range, no pauses.   Rare PVCs.   Rare PACs.   One 14 beat ventricular run, rate 129 bpm, occurred at 2:25 AM.   No symptoms reported.      Laboratory results of (09/28/22) were reviewed. Cholesterol profile of 126/118/36/66 mg/dL noted.  Laboratory results of (12/22/21) Cholesterol profile of 142/112/44/76 mg/dL noted.  Laboratory results of (07/01/21) cholesterol profile of 158/145/40/89 mg/dL noted.     PET SCAN (12/04/19)  ELECTROCARDIOGRAPHIC FINDINGS:    Normal sinus rhythm.  Normal ECG  response to dipyridamole infusion.  No ischemic changes noted   SCINTOGRAPHIC FINDINGS:    Normal left ventricular perfusion with stress and rest images.    There is no evidence of ischemia or scar.   However TID (transient ischemic dialatation) is noted (ratio is 2.0), can be assosiated with balance three vessel disease, correlate clinically.   GATED WALL MOTION FINDINGS:    The left ventricle wall motion is normal with stress and rest  imagings.  Measured resting ejection fraction is 79 %.   stress EF 89%.         Assessment & Plan     1. Essential hypertension  metoprolol SR (TOPROL XL) 25 MG TABLET SR 24 HR      2. Mixed hyperlipidemia        3. History of sinus tachycardia        4. Atherosclerosis of both carotid arteries            Medical Decision Making: Today's Assessment/Status/Plan:        Hypertension: Blood pressure is well controlled. We will continue with metoprolol, trandolapril.  Hyperlipidemia: She is doing well on statin therapy without myalgia symptoms.   Additional information: She is a retired nurse from [employer].     We will follow up the patient in one year.    CC Nba Gonzalez

## 2023-04-04 ENCOUNTER — TELEPHONE (OUTPATIENT)
Dept: CARDIOLOGY | Facility: MEDICAL CENTER | Age: 73
End: 2023-04-04
Payer: MEDICARE

## 2023-04-04 DIAGNOSIS — I10 ESSENTIAL HYPERTENSION: ICD-10-CM

## 2023-04-04 NOTE — TELEPHONE ENCOUNTER
DRE    Caller: Fiona    Medication Name and Dosage:     trandolapril (MAVIK) 4 MG Tab    Medication amount left: 2 days    Preferred Pharmacy:     [id number] -   [address] [address]   Phone:  [phone]  Fax:  [phone]   HEATHER #:  [id number]    Other questions (Topic): Pt states that DRE wanted her to take 2 tablets daily when the new script in February stated 1x daily. Pt now has run out and her pharmacy will not refill as they think its too early.    Callback Number (Will only call for issues): [phone]

## 2023-04-07 ENCOUNTER — PATIENT MESSAGE (OUTPATIENT)
Dept: CARDIOLOGY | Facility: MEDICAL CENTER | Age: 73
End: 2023-04-07
Payer: MEDICARE

## 2023-04-07 RX ORDER — TRANDOLAPRIL TABLETS 4 MG/1
8 TABLET ORAL DAILY
Qty: 180 TABLET | Refills: 3 | Status: SHIPPED | OUTPATIENT
Start: 2023-04-07 | End: 2024-01-25

## 2023-04-07 NOTE — TELEPHONE ENCOUNTER
**second attempt**    To JI, Is patient supposed to be on trandolapril 4mg (1 tablet) daily or 8mg (2 tablet) daily? Please advise, thank you!

## 2023-04-07 NOTE — TELEPHONE ENCOUNTER
"S/w JI advising if pt BP is still around the same as last OV with 8mg ok to continue 8 mg dosing as that is the maximum.      Returned pt call, [phone], and reviewed MD recommendations.  She states she has \"been taking 8mg for years.\"  Discussed original prescription may have been from her PCP. Medication sent to preferred pharmacy as requested.  She verbalizes understanding and states no other concerns or questions at this time.  Fiona is appreciative of information given.      "

## 2023-04-07 NOTE — TELEPHONE ENCOUNTER
Upon chart review, pt is active on [facility] last login today 4/7/2023.    JI response still pending.    PDV sent regarding findings.

## 2023-04-07 NOTE — TELEPHONE ENCOUNTER
DRE    Caller: Fiona Daniels     Topic/issue: Patient called back to check on her medication refill , she states she is completley out of her medication.    Callback Number: [phone]      Thank you   Mary Kay ESTRELLA

## 2023-04-07 NOTE — TELEPHONE ENCOUNTER
DRE    Caller: Fiona    Topic/issue: Pt is calling back really concerned as to why no one has reached out to her about being out of her script for trandolapril (MAVIK) 4 MG Tab. She is asking for someone to call her back today before the weekend. Advised this was sent over to DRE 4 days ago to review and still waiting for his response.     Callback Number: [phone]

## 2023-05-05 ENCOUNTER — HOSPITAL ENCOUNTER (OUTPATIENT)
Dept: RADIOLOGY | Facility: MEDICAL CENTER | Age: 73
End: 2023-05-05
Attending: INTERNAL MEDICINE
Payer: MEDICARE

## 2023-05-05 DIAGNOSIS — Z12.31 VISIT FOR SCREENING MAMMOGRAM: ICD-10-CM

## 2023-05-05 PROCEDURE — 77063 BREAST TOMOSYNTHESIS BI: CPT

## 2023-05-10 ENCOUNTER — HOSPITAL ENCOUNTER (OUTPATIENT)
Dept: LAB | Facility: MEDICAL CENTER | Age: 73
End: 2023-05-10
Attending: FAMILY MEDICINE
Payer: MEDICARE

## 2023-05-10 LAB
ALBUMIN SERPL BCP-MCNC: 4 G/DL (ref 3.2–4.9)
ALBUMIN/GLOB SERPL: 1.5 G/DL
ALP SERPL-CCNC: 89 U/L (ref 30–99)
ALT SERPL-CCNC: 18 U/L (ref 2–50)
ANION GAP SERPL CALC-SCNC: 11 MMOL/L (ref 7–16)
AST SERPL-CCNC: 23 U/L (ref 12–45)
BILIRUB SERPL-MCNC: 1.4 MG/DL (ref 0.1–1.5)
BUN SERPL-MCNC: 15 MG/DL (ref 8–22)
CALCIUM ALBUM COR SERPL-MCNC: 9.3 MG/DL (ref 8.5–10.5)
CALCIUM SERPL-MCNC: 9.3 MG/DL (ref 8.5–10.5)
CHLORIDE SERPL-SCNC: 107 MMOL/L (ref 96–112)
CHOLEST SERPL-MCNC: 158 MG/DL (ref 100–199)
CO2 SERPL-SCNC: 25 MMOL/L (ref 20–33)
CREAT SERPL-MCNC: 0.7 MG/DL (ref 0.5–1.4)
EST. AVERAGE GLUCOSE BLD GHB EST-MCNC: 114 MG/DL
FASTING STATUS PATIENT QL REPORTED: NORMAL
GFR SERPLBLD CREATININE-BSD FMLA CKD-EPI: 91 ML/MIN/1.73 M 2
GLOBULIN SER CALC-MCNC: 2.7 G/DL (ref 1.9–3.5)
GLUCOSE SERPL-MCNC: 105 MG/DL (ref 65–99)
HBA1C MFR BLD: 5.6 % (ref 4–5.6)
HDLC SERPL-MCNC: 40 MG/DL
LDLC SERPL CALC-MCNC: 91 MG/DL
POTASSIUM SERPL-SCNC: 3.7 MMOL/L (ref 3.6–5.5)
PROT SERPL-MCNC: 6.7 G/DL (ref 6–8.2)
SODIUM SERPL-SCNC: 143 MMOL/L (ref 135–145)
TRIGL SERPL-MCNC: 137 MG/DL (ref 0–149)

## 2023-05-10 PROCEDURE — 36415 COLL VENOUS BLD VENIPUNCTURE: CPT

## 2023-05-10 PROCEDURE — 80053 COMPREHEN METABOLIC PANEL: CPT

## 2023-05-10 PROCEDURE — 83036 HEMOGLOBIN GLYCOSYLATED A1C: CPT | Mod: GA

## 2023-05-10 PROCEDURE — 80061 LIPID PANEL: CPT

## 2023-06-15 ENCOUNTER — HOSPITAL ENCOUNTER (OUTPATIENT)
Dept: RADIOLOGY | Facility: MEDICAL CENTER | Age: 73
End: 2023-06-15
Attending: INTERNAL MEDICINE
Payer: MEDICARE

## 2023-06-15 DIAGNOSIS — Z12.31 VISIT FOR SCREENING MAMMOGRAM: ICD-10-CM

## 2023-06-15 DIAGNOSIS — C50.812 MALIGNANT NEOPLASM OF OVERLAPPING SITES OF LEFT FEMALE BREAST, UNSPECIFIED ESTROGEN RECEPTOR STATUS (HCC): ICD-10-CM

## 2023-06-15 PROCEDURE — 76641 ULTRASOUND BREAST COMPLETE: CPT

## 2023-07-12 PROBLEM — M25.562 CHRONIC PAIN OF LEFT KNEE: Status: ACTIVE | Noted: 2023-07-12

## 2023-07-12 PROBLEM — G89.29 CHRONIC PAIN OF LEFT KNEE: Status: ACTIVE | Noted: 2023-07-12

## 2023-07-14 PROBLEM — M21.611 BUNION, RIGHT FOOT: Status: RESOLVED | Noted: 2022-07-19 | Resolved: 2023-07-14

## 2023-07-14 PROBLEM — M79.671 PAIN IN RIGHT FOOT: Status: RESOLVED | Noted: 2022-11-22 | Resolved: 2023-07-14

## 2023-07-14 PROBLEM — M79.672 PAIN IN LEFT FOOT: Status: ACTIVE | Noted: 2023-07-14

## 2023-07-14 PROBLEM — Z71.85 VACCINE COUNSELING: Status: ACTIVE | Noted: 2023-07-14

## 2023-07-14 PROBLEM — Z00.00 PREVENTATIVE HEALTH CARE: Status: ACTIVE | Noted: 2023-07-14

## 2023-07-31 PROBLEM — M17.9 OSTEOARTHRITIS, KNEE: Status: ACTIVE | Noted: 2023-07-31

## 2023-08-17 PROBLEM — M17.12 PRIMARY OSTEOARTHRITIS OF LEFT KNEE: Status: ACTIVE | Noted: 2023-07-31

## 2023-08-18 ENCOUNTER — HOSPITAL ENCOUNTER (OUTPATIENT)
Dept: RADIOLOGY | Facility: MEDICAL CENTER | Age: 73
End: 2023-08-18
Attending: STUDENT IN AN ORGANIZED HEALTH CARE EDUCATION/TRAINING PROGRAM
Payer: MEDICARE

## 2023-08-18 DIAGNOSIS — G89.29 CHRONIC PAIN OF LEFT KNEE: ICD-10-CM

## 2023-08-18 DIAGNOSIS — M25.562 CHRONIC PAIN OF LEFT KNEE: ICD-10-CM

## 2023-08-18 DIAGNOSIS — M17.12 PRIMARY OSTEOARTHRITIS OF LEFT KNEE: ICD-10-CM

## 2023-08-18 PROCEDURE — 73700 CT LOWER EXTREMITY W/O DYE: CPT | Mod: LT

## 2023-10-10 ENCOUNTER — APPOINTMENT (OUTPATIENT)
Dept: RADIOLOGY | Facility: MEDICAL CENTER | Age: 73
End: 2023-10-10
Attending: EMERGENCY MEDICINE
Payer: MEDICARE

## 2023-10-10 ENCOUNTER — HOSPITAL ENCOUNTER (EMERGENCY)
Facility: MEDICAL CENTER | Age: 73
End: 2023-10-10
Attending: EMERGENCY MEDICINE
Payer: MEDICARE

## 2023-10-10 VITALS
HEART RATE: 111 BPM | WEIGHT: 212.96 LBS | RESPIRATION RATE: 20 BRPM | HEIGHT: 64 IN | BODY MASS INDEX: 36.36 KG/M2 | DIASTOLIC BLOOD PRESSURE: 70 MMHG | TEMPERATURE: 98.6 F | SYSTOLIC BLOOD PRESSURE: 154 MMHG | OXYGEN SATURATION: 92 %

## 2023-10-10 DIAGNOSIS — E87.6 HYPOKALEMIA: ICD-10-CM

## 2023-10-10 DIAGNOSIS — R60.0 EDEMA OF LEFT LOWER LEG: ICD-10-CM

## 2023-10-10 LAB
ANION GAP SERPL CALC-SCNC: 15 MMOL/L (ref 7–16)
BASOPHILS # BLD AUTO: 0.2 % (ref 0–1.8)
BASOPHILS # BLD: 0.02 K/UL (ref 0–0.12)
BUN SERPL-MCNC: 17 MG/DL (ref 8–22)
CALCIUM SERPL-MCNC: 9.3 MG/DL (ref 8.4–10.2)
CHLORIDE SERPL-SCNC: 101 MMOL/L (ref 96–112)
CO2 SERPL-SCNC: 25 MMOL/L (ref 20–33)
CREAT SERPL-MCNC: 0.7 MG/DL (ref 0.5–1.4)
EOSINOPHIL # BLD AUTO: 0.05 K/UL (ref 0–0.51)
EOSINOPHIL NFR BLD: 0.6 % (ref 0–6.9)
ERYTHROCYTE [DISTWIDTH] IN BLOOD BY AUTOMATED COUNT: 52.7 FL (ref 35.9–50)
GFR SERPLBLD CREATININE-BSD FMLA CKD-EPI: 91 ML/MIN/1.73 M 2
GLUCOSE SERPL-MCNC: 97 MG/DL (ref 65–99)
HCT VFR BLD AUTO: 40 % (ref 37–47)
HGB BLD-MCNC: 13 G/DL (ref 12–16)
IMM GRANULOCYTES # BLD AUTO: 0.03 K/UL (ref 0–0.11)
IMM GRANULOCYTES NFR BLD AUTO: 0.3 % (ref 0–0.9)
INR PPP: 1.02 (ref 0.87–1.13)
LYMPHOCYTES # BLD AUTO: 1.26 K/UL (ref 1–4.8)
LYMPHOCYTES NFR BLD: 14.1 % (ref 22–41)
MCH RBC QN AUTO: 32.8 PG (ref 27–33)
MCHC RBC AUTO-ENTMCNC: 32.5 G/DL (ref 32.2–35.5)
MCV RBC AUTO: 101 FL (ref 81.4–97.8)
MONOCYTES # BLD AUTO: 0.59 K/UL (ref 0–0.85)
MONOCYTES NFR BLD AUTO: 6.6 % (ref 0–13.4)
NEUTROPHILS # BLD AUTO: 6.96 K/UL (ref 1.82–7.42)
NEUTROPHILS NFR BLD: 78.2 % (ref 44–72)
NRBC # BLD AUTO: 0.02 K/UL
NRBC BLD-RTO: 0.2 /100 WBC (ref 0–0.2)
PLATELET # BLD AUTO: 388 K/UL (ref 164–446)
PMV BLD AUTO: 9.7 FL (ref 9–12.9)
POTASSIUM SERPL-SCNC: 3.2 MMOL/L (ref 3.6–5.5)
PROTHROMBIN TIME: 13.8 SEC (ref 12–14.6)
RBC # BLD AUTO: 3.96 M/UL (ref 4.2–5.4)
SODIUM SERPL-SCNC: 141 MMOL/L (ref 135–145)
WBC # BLD AUTO: 8.9 K/UL (ref 4.8–10.8)

## 2023-10-10 PROCEDURE — 85610 PROTHROMBIN TIME: CPT

## 2023-10-10 PROCEDURE — 80048 BASIC METABOLIC PNL TOTAL CA: CPT

## 2023-10-10 PROCEDURE — 99284 EMERGENCY DEPT VISIT MOD MDM: CPT

## 2023-10-10 PROCEDURE — 36415 COLL VENOUS BLD VENIPUNCTURE: CPT

## 2023-10-10 PROCEDURE — 93971 EXTREMITY STUDY: CPT | Mod: 26,LT | Performed by: INTERNAL MEDICINE

## 2023-10-10 PROCEDURE — 85025 COMPLETE CBC W/AUTO DIFF WBC: CPT

## 2023-10-10 PROCEDURE — 93971 EXTREMITY STUDY: CPT | Mod: LT

## 2023-10-10 RX ORDER — OXYCODONE HYDROCHLORIDE 5 MG/1
5 TABLET ORAL EVERY 4 HOURS PRN
Status: SHIPPED | COMMUNITY
End: 2024-01-25

## 2023-10-10 RX ORDER — ACETAMINOPHEN 500 MG
1000 TABLET ORAL EVERY 6 HOURS PRN
Status: SHIPPED | COMMUNITY
End: 2024-03-18

## 2023-10-10 RX ORDER — COLCHICINE 0.6 MG/1
.6-1.2 TABLET ORAL PRN
COMMUNITY

## 2023-10-10 RX ORDER — TRAMADOL HYDROCHLORIDE 50 MG/1
50 TABLET ORAL EVERY 6 HOURS PRN
Status: SHIPPED | COMMUNITY
End: 2024-01-25

## 2023-10-10 ASSESSMENT — FIBROSIS 4 INDEX: FIB4 SCORE: 1.46

## 2023-10-10 NOTE — ED PROVIDER NOTES
ED Provider Note    CHIEF COMPLAINT  Chief Complaint   Patient presents with    Sent by MD     Pt was at [facility] today for eval of LLE s/p Left Total Knee 09/28/23    Leg Swelling     Pt states noticed pain and swelling LLE 2 days ago       EXTERNAL RECORDS REVIEWED  Outpatient Notes outpatient note from renal orthopedic clinic from earlier today reviewed.  Patient with a well score of 4.  Referred for rule out DVT.    HPI/ROS  LIMITATION TO HISTORY   Select: : None    OUTSIDE HISTORIAN(S):  none    Fiona Daniels is a 73 y.o. female with history of breast cancer, dyslipidemia, and hypertension who presents from [facility] for evaluation of left leg swelling status post total knee replacement on 9/28/2023.    Patient noted pain behind her knee on Sunday evening that is achy, nonradiating.    Patient states she went to PT today.  She had her leg measured and it was more swollen when compared to last week at her PT session on 10/3.    Patient has been taking Xarelto on a daily basis since her surgery.  Her last dose was last evening.  She was prescribed 21 days of this.    Patient has been managing her pain with Tylenol.  She took a couple of doses of oxycodone immediately postoperatively but has not needed anymore.  She is allergic to aspirin.  She has been using a cane for support but is able to bear weight.    Patient denies fever, chills, obvious drainage, hemoptysis, chest pain, shortness of breath, history of blood clots.    PAST MEDICAL HISTORY   has a past medical history of Anesthesia, Arthritis (07/16/2020), ASTHMA, Back pain, Breast cancer (HCC), Bunion, right foot (7/19/2022), Cancer (HCC), Carcinoma of upper-outer quadrant of left breast in female, estrogen receptor positive (HCC) (08/05/2020), Chickenpox, [place] measles, Hepatitis A, High cholesterol, Hyperlipidemia, Hypertension, Mumps, Pain (07/16/2020), Pain in right foot (11/22/2022), Painful joint, Positive PPD, and Wears  "glasses.    SURGICAL HISTORY   has a past surgical history that includes other orthopedic surgery; ethmoidectomy (02/14/2012); nasal polypectomy (02/14/2012); nasal septal reconstruction (02/14/2012); other (2014); breast reduction (Right, 07/21/2020); partial mastectomy (Left, 07/21/2020); node biopsy sentinel (Left, 07/21/2020); axillary node dissection (Left, 07/21/2020); breast reconstruction (Left, 07/21/2020); total hip arthroplasty (Right, 09/30/2021); hip replacement, total; sinuscope; fusion big toe,mt-p jt (Right, 9/14/2022); excision tumor soft tissue foot/toe subq * (Right, 9/14/2022); explor tarsal/tarsometatar jt (Right, 12/19/2022); and total knee arthroplasty (Left, 9/28/2023).    FAMILY HISTORY  Family History   Problem Relation Age of Onset    Cancer Mother         Rectal Cancer    Hypertension Mother     Heart Disease Father     Heart Attack Father        SOCIAL HISTORY  Social History     Tobacco Use    Smoking status: Never     Passive exposure: Never    Smokeless tobacco: Never   Vaping Use    Vaping Use: Never used   Substance and Sexual Activity    Alcohol use: No    Drug use: No    Sexual activity: Not on file       CURRENT MEDICATIONS  Xarelto    ALLERGIES  Allergies   Allergen Reactions    Aspirin Anaphylaxis    Codeine      \"passed out\"    Moxifloxacin Hcl In Nacl Rash    Robitussin Dm [Guiatuss Dm] Anaphylaxis       PHYSICAL EXAM  VITAL SIGNS: BP (!) 154/70   Pulse (!) 111   Temp 37 °C (98.6 °F) (Temporal)   Resp 20   Ht 1.626 m (5' 4\")   Wt 96.6 kg (212 lb 15.4 oz)   SpO2 92%   BMI 36.56 kg/m²    General:  WDWN female, nontoxic appearing in NAD; A+Ox3; V/S as above; afebrile, elevated blood pressure, tachycardic  Skin: warm and dry; good color; no rash  HEENT: NCAT; EOMs intact; PERRL; no scleral icterus   Neck: FROM  Cardiovascular: Regular heart rate and rhythm.  No murmurs, rubs, or gallops; pulses 2+ bilaterally radially and DP areas  Extremities: MEYER x 4; left knee with " clean, dry, intact bandage; incision is without erythema or drainage after bandages removed; there is ecchymosis of the left foot and anterior left lower leg; DP pulse 2+  Neurologic: CNs III-XII grossly intact; speech clear; distal sensation intact  Psychiatric: Appropriate affect, normal mood      DIAGNOSTIC STUDIES / PROCEDURES  LABS  Results for orders placed or performed during the hospital encounter of 10/10/23   CBC WITH DIFFERENTIAL   Result Value Ref Range    WBC 8.9 4.8 - 10.8 K/uL    RBC 3.96 (L) 4.20 - 5.40 M/uL    Hemoglobin 13.0 12.0 - 16.0 g/dL    Hematocrit 40.0 37.0 - 47.0 %    .0 (H) 81.4 - 97.8 fL    MCH 32.8 27.0 - 33.0 pg    MCHC 32.5 32.2 - 35.5 g/dL    RDW 52.7 (H) 35.9 - 50.0 fL    Platelet Count 388 164 - 446 K/uL    MPV 9.7 9.0 - 12.9 fL    Neutrophils-Polys 78.20 (H) 44.00 - 72.00 %    Lymphocytes 14.10 (L) 22.00 - 41.00 %    Monocytes 6.60 0.00 - 13.40 %    Eosinophils 0.60 0.00 - 6.90 %    Basophils 0.20 0.00 - 1.80 %    Immature Granulocytes 0.30 0.00 - 0.90 %    Nucleated RBC 0.20 0.00 - 0.20 /100 WBC    Neutrophils (Absolute) 6.96 1.82 - 7.42 K/uL    Lymphs (Absolute) 1.26 1.00 - 4.80 K/uL    Monos (Absolute) 0.59 0.00 - 0.85 K/uL    Eos (Absolute) 0.05 0.00 - 0.51 K/uL    Baso (Absolute) 0.02 0.00 - 0.12 K/uL    Immature Granulocytes (abs) 0.03 0.00 - 0.11 K/uL    NRBC (Absolute) 0.02 K/uL   Prothrombin Time   Result Value Ref Range    PT 13.8 12.0 - 14.6 sec    INR 1.02 0.87 - 1.13   Basic Metabolic Panel   Result Value Ref Range    Sodium 141 135 - 145 mmol/L    Potassium 3.2 (L) 3.6 - 5.5 mmol/L    Chloride 101 96 - 112 mmol/L    Co2 25 20 - 33 mmol/L    Glucose 97 65 - 99 mg/dL    Bun 17 8 - 22 mg/dL    Creatinine 0.70 0.50 - 1.40 mg/dL    Calcium 9.3 8.4 - 10.2 mg/dL    Anion Gap 15.0 7.0 - 16.0   ESTIMATED GFR   Result Value Ref Range    GFR (CKD-EPI) 91 >60 mL/min/1.73 m 2         RADIOLOGY  Radiologist interpretation:   US-EXTREMITY VENOUS LOWER UNILAT LEFT    (Results  Pending)         COURSE & MEDICAL DECISION MAKING    ED Observation Status? Yes; I am placing the patient in to an observation status due to a diagnostic uncertainty as well as therapeutic intensity. Patient placed in observation status at 3:51 PM, 10/10/2023.     Observation plan is as follows: labs, US    INITIAL ASSESSMENT, COURSE AND PLAN  Care Narrative: This is a 73-year-old female who had a left knee replacement on 9/28 by Dr. Chapman.  She comes in after being seen by PT and [facility] today with concerns for left lower extremity edema and pain.  She is neurovascularly intact.  The incision appears free of signs of infection.  I do not suspect a septic joint.  I do not suspect compartment syndrome.  Ultrasound was ordered and is pending at the time of signout to Dr. Almaguer.  Labs show mild hypokalemia, no anemia, normal white blood cell count.  INR 1.  Creatinine normal.    FINAL DIAGNOSIS  1. Edema of left lower leg    2. Hypokalemia           Electronically signed by: Anais Espinoza M.D., 10/10/2023 2:07 PM

## 2023-10-10 NOTE — ED NOTES
Medication history reviewed with pt. Med rec is complete.  Allergies reviewed, per pt    Patient has not had any outpatient antibiotics in the last 30 days.    Pt is on anticoagulants, pt takes XARELTO 10MG 1 tablet QDAY for 21 day course, pt started on 9/28/2023.

## 2023-10-10 NOTE — ED TRIAGE NOTES
"Chief Complaint   Patient presents with    Sent by MD     Pt was at [facility] today for eval of LLE s/p Left Total Knee 09/28/23    Leg Swelling     Pt states noticed pain and swelling LLE 2 days ago     BP (!) 154/70   Pulse (!) 111   Temp 37 °C (98.6 °F) (Temporal)   Resp 20   Ht 1.626 m (5' 4\")   Wt 96.6 kg (212 lb 15.4 oz)   SpO2 92%   BMI 36.56 kg/m²     Pt ambulated to ED by self using cane for c/o LLE swelling and pain.  Pt was at [facility] today for PT and mentioned swelling and pain, sent to ED to r/o DVT.  Pt currently taking Xarelto s/p surgery r/t allergy to ASA.  "

## 2023-10-10 NOTE — ED NOTES
Pt Aox4, not in any form of distress. Pt laying comfortably in bed, warm blankets provided, side rails raised up, bed locked. Call bell within reach.     Denies any discomfort. Extra pillow provided as per request

## 2023-10-10 NOTE — ED NOTES
ERP at bedside. Pt agrees with plan of care discussed by ERP. Yasemin in low position, side rail up for pt safety. Call light within reach. Plan of care on-going

## 2023-10-11 NOTE — ED NOTES
Vital signs taken and recorded. IV removed. Discharge in stable condition ambulatory. Health teachings given to patient with full understanding of the information given. No personal belongings left.     Wound dressing done by gauze and co-band

## 2023-10-11 NOTE — DISCHARGE SUMMARY
"  ED Observation Discharge Summary    Patient:Fiona Daniels  Patient : 1950  Patient MRN: [id number]  Patient PCP: ERICKA Thao    Admit Date: 10/10/2023  Discharge Date and Time: 10/10/23 6:18 PM  Discharge Diagnosis:   1. Edema of left lower leg    2. Hypokalemia       Discharge Attending: Simon Almaguer M.D.  Discharge Service: ED Observation    ED Course  Fiona is a 73 y.o. female who was evaluated at [facility] for left lower extremity swelling.  Patient was seen and evaluated and found to have no acute emergent problems.  She did have mild hypokalemia and extensive ecchymosis of the left lower extremity which is postsurgical and expected.  She states understanding of the findings in terms of both the labs and the ultrasound.  She does not appear to have a DVT and will be discharged outpatient follow-up.    Discharge Exam:  BP (!) 154/70   Pulse (!) 111   Temp 37 °C (98.6 °F) (Temporal)   Resp 20   Ht 1.626 m (5' 4\")   Wt 96.6 kg (212 lb 15.4 oz)   SpO2 92%   BMI 36.56 kg/m² .    Constitutional: Awake and alert. Nontoxic  HENT:  Grossly normal  Eyes: Grossly normal  Neck: Normal range of motion  Cardiovascular: Normal heart rate   Thorax & Lungs: No respiratory distress  Abdomen: No distention  Skin: Purpleish yellow ecchymosis of left lower extremity, most prominent in the dependent position near the calf.  Extremities: Well perfused otherwise.  Psychiatric: Affect normal    Labs  Results for orders placed or performed during the hospital encounter of 10/10/23   CBC WITH DIFFERENTIAL   Result Value Ref Range    WBC 8.9 4.8 - 10.8 K/uL    RBC 3.96 (L) 4.20 - 5.40 M/uL    Hemoglobin 13.0 12.0 - 16.0 g/dL    Hematocrit 40.0 37.0 - 47.0 %    .0 (H) 81.4 - 97.8 fL    MCH 32.8 27.0 - 33.0 pg    MCHC 32.5 32.2 - 35.5 g/dL    RDW 52.7 (H) 35.9 - 50.0 fL    Platelet Count 388 164 - 446 K/uL    MPV 9.7 9.0 - 12.9 fL    Neutrophils-Polys 78.20 (H) 44.00 - 72.00 %    Lymphocytes " 14.10 (L) 22.00 - 41.00 %    Monocytes 6.60 0.00 - 13.40 %    Eosinophils 0.60 0.00 - 6.90 %    Basophils 0.20 0.00 - 1.80 %    Immature Granulocytes 0.30 0.00 - 0.90 %    Nucleated RBC 0.20 0.00 - 0.20 /100 WBC    Neutrophils (Absolute) 6.96 1.82 - 7.42 K/uL    Lymphs (Absolute) 1.26 1.00 - 4.80 K/uL    Monos (Absolute) 0.59 0.00 - 0.85 K/uL    Eos (Absolute) 0.05 0.00 - 0.51 K/uL    Baso (Absolute) 0.02 0.00 - 0.12 K/uL    Immature Granulocytes (abs) 0.03 0.00 - 0.11 K/uL    NRBC (Absolute) 0.02 K/uL   Prothrombin Time   Result Value Ref Range    PT 13.8 12.0 - 14.6 sec    INR 1.02 0.87 - 1.13   Basic Metabolic Panel   Result Value Ref Range    Sodium 141 135 - 145 mmol/L    Potassium 3.2 (L) 3.6 - 5.5 mmol/L    Chloride 101 96 - 112 mmol/L    Co2 25 20 - 33 mmol/L    Glucose 97 65 - 99 mg/dL    Bun 17 8 - 22 mg/dL    Creatinine 0.70 0.50 - 1.40 mg/dL    Calcium 9.3 8.4 - 10.2 mg/dL    Anion Gap 15.0 7.0 - 16.0   ESTIMATED GFR   Result Value Ref Range    GFR (CKD-EPI) 91 >60 mL/min/1.73 m 2       Radiology  US-EXTREMITY VENOUS LOWER UNILAT LEFT   Final Result      FINDINGS:   Left lower extremity.    All veins demonstrate complete color filling and compressibility with    normal venous flow dynamics including spontaneous flow and respiratory    phasicity.    No deep venous thrombosis.    The peroneal and posterior tibial veins are difficult to assess for    compressibility, but flow response to augmentation is demonstrated.    Flow was evaluated in the contralateral common femoral vein and normal    venous flow dynamics including spontaneous flow and respiratory phasic    variation were demonstrated.     Medications:   New Prescriptions    No medications on file       My final assessment includes bedside evaluation  Upon Reevaluation, the patient's condition has: Improved; and will be discharged.    Patient discharged from ED Observation status at 6:20 PM October 10, 2023    Total time spent on this ED Observation  discharge encounter is < 30 Minutes    Electronically signed by: Simon Almaguer M.D., 10/10/2023 6:18 PM

## 2023-11-29 ENCOUNTER — PATIENT MESSAGE (OUTPATIENT)
Dept: HEALTH INFORMATION MANAGEMENT | Facility: OTHER | Age: 73
End: 2023-11-29

## 2024-01-25 PROBLEM — M17.9 OSTEOARTHRITIS, KNEE: Status: RESOLVED | Noted: 2023-07-31 | Resolved: 2024-01-25

## 2024-01-25 PROBLEM — M16.9 OSTEOARTHRITIS OF HIP: Status: RESOLVED | Noted: 2021-09-13 | Resolved: 2024-01-25

## 2024-01-25 PROBLEM — M25.571 PAIN IN RIGHT ANKLE: Status: RESOLVED | Noted: 2022-11-22 | Resolved: 2024-01-25

## 2024-01-25 PROBLEM — M25.562 CHRONIC PAIN OF LEFT KNEE: Status: RESOLVED | Noted: 2023-07-12 | Resolved: 2024-01-25

## 2024-01-25 PROBLEM — G89.29 CHRONIC PAIN OF LEFT KNEE: Status: RESOLVED | Noted: 2023-07-12 | Resolved: 2024-01-25

## 2024-01-25 PROBLEM — J45.909 MILD ASTHMA: Chronic | Status: ACTIVE | Noted: 2020-07-21

## 2024-01-25 PROBLEM — Z63.8 CAREGIVER ROLE STRAIN: Status: ACTIVE | Noted: 2024-01-25

## 2024-02-06 ENCOUNTER — APPOINTMENT (RX ONLY)
Dept: URBAN - METROPOLITAN AREA CLINIC 6 | Facility: CLINIC | Age: 74
Setting detail: DERMATOLOGY
End: 2024-02-06

## 2024-02-06 DIAGNOSIS — L82.1 OTHER SEBORRHEIC KERATOSIS: ICD-10-CM

## 2024-02-06 DIAGNOSIS — D18.0 HEMANGIOMA: ICD-10-CM

## 2024-02-06 DIAGNOSIS — L57.0 ACTINIC KERATOSIS: ICD-10-CM

## 2024-02-06 DIAGNOSIS — L81.4 OTHER MELANIN HYPERPIGMENTATION: ICD-10-CM

## 2024-02-06 DIAGNOSIS — Z71.89 OTHER SPECIFIED COUNSELING: ICD-10-CM

## 2024-02-06 DIAGNOSIS — L82.0 INFLAMED SEBORRHEIC KERATOSIS: ICD-10-CM

## 2024-02-06 DIAGNOSIS — D22 MELANOCYTIC NEVI: ICD-10-CM

## 2024-02-06 PROBLEM — D18.01 HEMANGIOMA OF SKIN AND SUBCUTANEOUS TISSUE: Status: ACTIVE | Noted: 2024-02-06

## 2024-02-06 PROBLEM — D22.5 MELANOCYTIC NEVI OF TRUNK: Status: ACTIVE | Noted: 2024-02-06

## 2024-02-06 PROCEDURE — 99213 OFFICE O/P EST LOW 20 MIN: CPT | Mod: 25

## 2024-02-06 PROCEDURE — 17003 DESTRUCT PREMALG LES 2-14: CPT | Mod: 59

## 2024-02-06 PROCEDURE — ? LIQUID NITROGEN

## 2024-02-06 PROCEDURE — 17110 DESTRUCTION B9 LES UP TO 14: CPT

## 2024-02-06 PROCEDURE — ? SUNSCREEN TREATMENT REGIMEN

## 2024-02-06 PROCEDURE — 17000 DESTRUCT PREMALG LESION: CPT | Mod: 59

## 2024-02-06 PROCEDURE — ? SUNSCREEN RECOMMENDATIONS

## 2024-02-06 PROCEDURE — ? COUNSELING

## 2024-02-06 ASSESSMENT — LOCATION SIMPLE DESCRIPTION DERM
LOCATION SIMPLE: LEFT HAND
LOCATION SIMPLE: RIGHT BREAST
LOCATION SIMPLE: NECK
LOCATION SIMPLE: RIGHT HAND
LOCATION SIMPLE: GROIN
LOCATION SIMPLE: LEFT POSTERIOR UPPER ARM
LOCATION SIMPLE: ABDOMEN
LOCATION SIMPLE: CHEST
LOCATION SIMPLE: LEFT CHEEK
LOCATION SIMPLE: NOSE
LOCATION SIMPLE: RIGHT UPPER ARM

## 2024-02-06 ASSESSMENT — LOCATION DETAILED DESCRIPTION DERM
LOCATION DETAILED: LEFT SUPRAPUBIC SKIN
LOCATION DETAILED: PERIUMBILICAL SKIN
LOCATION DETAILED: LEFT INFERIOR CENTRAL MALAR CHEEK
LOCATION DETAILED: LEFT ULNAR DORSAL HAND
LOCATION DETAILED: RIGHT MEDIAL SUPERIOR CHEST
LOCATION DETAILED: RIGHT CENTRAL LATERAL NECK
LOCATION DETAILED: RIGHT ANTERIOR PROXIMAL UPPER ARM
LOCATION DETAILED: EPIGASTRIC SKIN
LOCATION DETAILED: LEFT DISTAL POSTERIOR UPPER ARM
LOCATION DETAILED: RIGHT RADIAL DORSAL HAND
LOCATION DETAILED: LEFT INFERIOR MEDIAL MALAR CHEEK
LOCATION DETAILED: RIGHT ULNAR DORSAL HAND
LOCATION DETAILED: LEFT RADIAL DORSAL HAND
LOCATION DETAILED: NASAL DORSUM
LOCATION DETAILED: RIGHT MEDIAL BREAST 4-5:00 REGION

## 2024-02-06 ASSESSMENT — LOCATION ZONE DERM
LOCATION ZONE: NECK
LOCATION ZONE: NOSE
LOCATION ZONE: TRUNK
LOCATION ZONE: FACE
LOCATION ZONE: ARM
LOCATION ZONE: HAND

## 2024-02-06 NOTE — PROCEDURE: LIQUID NITROGEN
Number Of Freeze-Thaw Cycles: 2 freeze-thaw cycles
Show Applicator Variable?: Yes
Render Note In Bullet Format When Appropriate: No
Consent: The patient's consent was obtained including but not limited to risks of crusting, scabbing, blistering, scarring, darker or lighter pigmentary change, recurrence, incomplete removal and infection.
Duration Of Freeze Thaw-Cycle (Seconds): 10
Post-Care Instructions: I reviewed with the patient in detail post-care instructions. Patient is to wear sunprotection, and avoid picking at any of the treated lesions. Pt may apply Vaseline to crusted or scabbing areas.
Detail Level: Detailed
Spray Paint Text: The liquid nitrogen was applied to the skin utilizing a spray paint frosting technique.
Medical Necessity Information: It is in your best interest to select a reason for this procedure from the list below. All of these items fulfill various CMS LCD requirements except the new and changing color options.
Number Of Freeze-Thaw Cycles: 3 freeze-thaw cycles
Medical Necessity Clause: This procedure was medically necessary because the lesions that were treated were:
Duration Of Freeze Thaw-Cycle (Seconds): 10-15

## 2024-02-06 NOTE — PROCEDURE: MIPS QUALITY
Quality 111:Pneumonia Vaccination Status For Older Adults: Pneumococcal Vaccination Previously Received
Detail Level: Detailed
Quality 130: Documentation Of Current Medications In The Medical Record: Current Medications Documented
Quality 226: Preventive Care And Screening: Tobacco Use: Screening And Cessation Intervention: Patient screened for tobacco use and is an ex/non-smoker
Quality 431: Preventive Care And Screening: Unhealthy Alcohol Use - Screening: Patient not identified as an unhealthy alcohol user when screened for unhealthy alcohol use using a systematic screening method
No indicators present

## 2024-03-12 ENCOUNTER — HOSPITAL ENCOUNTER (OUTPATIENT)
Dept: RADIOLOGY | Facility: MEDICAL CENTER | Age: 74
End: 2024-03-12
Attending: INTERNAL MEDICINE
Payer: MEDICARE

## 2024-03-12 DIAGNOSIS — C50.812 MALIGNANT NEOPLASM OF OVERLAPPING SITES OF LEFT FEMALE BREAST, UNSPECIFIED ESTROGEN RECEPTOR STATUS (HCC): ICD-10-CM

## 2024-03-12 DIAGNOSIS — Z79.811 LONG TERM CURRENT USE OF AROMATASE INHIBITOR: ICD-10-CM

## 2024-03-12 DIAGNOSIS — M81.0 AGE-RELATED OSTEOPOROSIS WITHOUT CURRENT PATHOLOGICAL FRACTURE: ICD-10-CM

## 2024-03-12 DIAGNOSIS — M85.80 OTHER SPECIFIED DISORDERS OF BONE DENSITY AND STRUCTURE, UNSPECIFIED SITE: ICD-10-CM

## 2024-03-12 PROCEDURE — 76642 ULTRASOUND BREAST LIMITED: CPT | Mod: LT

## 2024-03-12 PROCEDURE — G0279 TOMOSYNTHESIS, MAMMO: HCPCS

## 2024-03-18 ENCOUNTER — OFFICE VISIT (OUTPATIENT)
Dept: CARDIOLOGY | Facility: MEDICAL CENTER | Age: 74
End: 2024-03-18
Attending: INTERNAL MEDICINE
Payer: MEDICARE

## 2024-03-18 VITALS
SYSTOLIC BLOOD PRESSURE: 114 MMHG | OXYGEN SATURATION: 95 % | WEIGHT: 209 LBS | HEART RATE: 77 BPM | DIASTOLIC BLOOD PRESSURE: 60 MMHG | RESPIRATION RATE: 16 BRPM | BODY MASS INDEX: 34.82 KG/M2 | HEIGHT: 65 IN

## 2024-03-18 DIAGNOSIS — I10 ESSENTIAL HYPERTENSION: Chronic | ICD-10-CM

## 2024-03-18 DIAGNOSIS — E78.2 MIXED HYPERLIPIDEMIA: ICD-10-CM

## 2024-03-18 PROCEDURE — 99213 OFFICE O/P EST LOW 20 MIN: CPT | Performed by: INTERNAL MEDICINE

## 2024-03-18 PROCEDURE — 99214 OFFICE O/P EST MOD 30 MIN: CPT | Performed by: INTERNAL MEDICINE

## 2024-03-18 PROCEDURE — 3078F DIAST BP <80 MM HG: CPT | Performed by: INTERNAL MEDICINE

## 2024-03-18 PROCEDURE — 3074F SYST BP LT 130 MM HG: CPT | Performed by: INTERNAL MEDICINE

## 2024-03-18 ASSESSMENT — ENCOUNTER SYMPTOMS
DIZZINESS: 0
PALPITATIONS: 0
NEUROLOGICAL NEGATIVE: 1
NAUSEA: 0
FOCAL WEAKNESS: 0
EYES NEGATIVE: 1
NERVOUS/ANXIOUS: 0
BRUISES/BLEEDS EASILY: 0
BLURRED VISION: 0
MUSCULOSKELETAL NEGATIVE: 1
RESPIRATORY NEGATIVE: 1
PSYCHIATRIC NEGATIVE: 1
DEPRESSION: 0
WEAKNESS: 0
CONSTITUTIONAL NEGATIVE: 1
MYALGIAS: 0
ABDOMINAL PAIN: 0
CLAUDICATION: 0
VOMITING: 0
HEADACHES: 0
COUGH: 0
DOUBLE VISION: 0
SHORTNESS OF BREATH: 0
FEVER: 0
CHILLS: 0
GASTROINTESTINAL NEGATIVE: 1
CARDIOVASCULAR NEGATIVE: 1
WEIGHT LOSS: 0

## 2024-03-18 ASSESSMENT — FIBROSIS 4 INDEX: FIB4 SCORE: 1.03

## 2024-03-18 NOTE — PROGRESS NOTES
"Chief Complaint   Patient presents with    Hypertension     F/V Dx: Essential hypertension    Hyperlipidemia       Subjective     Fiona Daniels is a 73 y.o. female who presents today for annual follow up of hypertension and hyperlipidemia.    Since the patient's last visit on 02/15/23, she has been doing well clinically. She denies chest pain, shortness of breath, palpitations, nausea/vomiting or diaphoresis. She underwent multiple orthopedic surgeries. She keeps active walking.    Past Medical History:   Diagnosis Date    Anesthesia     mother  \"dead for a minute\"    Arthritis 07/16/2020    hx Gout    ASTHMA     well managed    Back pain     Breast cancer (HCC)     Bunion, right foot 07/19/2022    Added automatically from request for surgery [id number]    Cancer (HCC)     left partial masecteomy and 5 lymph nodes removed - June/July 2020    Carcinoma of upper-outer quadrant of left breast in female, estrogen receptor positive (HCC) 08/05/2020    Chickenpox     Chronic pain of left knee 07/12/2023    [place] measles     Hepatitis A     age 6    High cholesterol     Hyperlipidemia     Hypertension     Mumps     Osteoarthritis of hip 09/13/2021    Added automatically from request for surgery [id number]    Osteoarthritis, knee 07/31/2023    Pain 07/16/2020    back pain    Pain in right foot 11/22/2022    Added automatically from request for surgery [id number]    Painful joint     Positive PPD     Wears glasses      Past Surgical History:   Procedure Laterality Date    PB TOTAL KNEE ARTHROPLASTY Left 09/28/2023    Procedure: LEFT TOTAL KNEE ARTHROPLASTY;  Surgeon: Syd Chapman M.D.;  Location: [facility];  Service: Orthopedics    PB EXPLOR TARSAL/TARSOMETATAR JT Right 12/19/2022    Procedure: RIGHT FIRST INTERPHALANGEAL JOINT DEBRIDEMENT, RIGHT EXCISION GOUT, REPAIRS AS INDICATED;  Surgeon: Bert Mo M.D.;  Location: [facility] [place];  Service: Orthopedics    PB FUSION BIG TOE,MT-P JT " Right 09/14/2022    Procedure: RIGHT FIRST METATARSOPHALANGEAL JOINT FUSION;  Surgeon: Bert Mo M.D.;  Location: [facility];  Service: Orthopedics    RI EXCISION TUMOR SOFT TISSUE FOOT/TOE SUBQ * Right 09/14/2022    Procedure: RIGHT FIRST METATARSOPHALANGEAL JOINT EXCISION, RIGHT GREAT TOE EXCISION, REPAIRS AS INDICATED;  Surgeon: Bert Mo M.D.;  Location: [facility];  Service: Orthopedics    RI TOTAL HIP ARTHROPLASTY Right 09/30/2021    Procedure: RIGHT ANTERIOR TOTAL HIP ARTHROPLASTY;  Surgeon: Syd Chapman M.D.;  Location: [facility];  Service: Orthopedics    RI BREAST REDUCTION Right 07/21/2020    Procedure: MAMMOPLASTY, REDUCTION-RIGHT BREAST FOR SYMMETRY;  Surgeon: Steven Schulz M.D.;  Location: [facility];  Service: General    PARTIAL MASTECTOMY Left 07/21/2020    Procedure: MASTECTOMY, PARTIAL- WIRE LOC;  Surgeon: Shy Jean M.D.;  Location: [facility];  Service: General    NODE BIOPSY SENTINEL Left 07/21/2020    Procedure: BIOPSY, LYMPH NODE, SENTINEL;  Surgeon: Shy Jean M.D.;  Location: [facility];  Service: General    AXILLARY NODE DISSECTION Left 07/21/2020    Procedure: LYMPHADENECTOMY, AXILLARY- POSS;  Surgeon: Shy Jean M.D.;  Location: [facility];  Service: General    BREAST RECONSTRUCTION Left 07/21/2020    Procedure: RECONSTRUCTION, BREAST-WITH LOCAL TISSUE REARRANGEMENT;  Surgeon: Steven Schulz M.D.;  Location: [facility];  Service: General    OTHER  2014    sinus    ETHMOIDECTOMY  02/14/2012    Performed by GÓMEZ VALDERRAMA at SURGERY SAME DAY [facility]    NASAL POLYPECTOMY  02/14/2012    Performed by GÓMEZ VALDERRAMA at SURGERY SAME DAY [facility]    NASAL SEPTAL RECONSTRUCTION  02/14/2012    Performed by GÓMEZ VALDERRAMA at SURGERY SAME DAY [facility]    HIP REPLACEMENT, TOTAL      LUMPECTOMY      OTHER ORTHOPEDIC SURGERY    "   ORIF right wrist    IN RADIATION THERAPY PLAN SIMPLE      SINUSCOPE       Family History   Problem Relation Age of Onset    Cancer Mother         Rectal Cancer    Hypertension Mother     Heart Disease Father     Heart Attack Father      Social History     Socioeconomic History    Marital status:      Spouse name: Not on file    Number of children: Not on file    Years of education: Not on file    Highest education level: Not on file   Occupational History    Not on file   Tobacco Use    Smoking status: Never     Passive exposure: Never    Smokeless tobacco: Never   Vaping Use    Vaping Use: Never used   Substance and Sexual Activity    Alcohol use: No    Drug use: No    Sexual activity: Not on file   Other Topics Concern    Not on file   Social History Narrative    Not on file     Social Determinants of Health     Financial Resource Strain: Not on file   Food Insecurity: Not on file   Transportation Needs: Not on file   Physical Activity: Not on file   Stress: Not on file   Social Connections: Not on file   Intimate Partner Violence: Not on file   Housing Stability: Not on file     Allergies   Allergen Reactions    Aspirin Anaphylaxis    Robitussin Dm [Guiatuss Dm] Anaphylaxis    Codeine      \"passed out\"    Moxifloxacin Hcl In Nacl Rash     (Medications reviewed.)  Outpatient Encounter Medications as of 3/18/2024   Medication Sig Dispense Refill    atorvastatin (LIPITOR) 20 MG Tab Take 1 Tablet by mouth every evening. 100 Tablet 3    trandolapril (MAVIK) 4 MG Tab Take 2 Tablets by mouth at bedtime. 200 Tablet 3    metoprolol SR (TOPROL XL) 25 MG TABLET SR 24 HR Take 1 Tablet by mouth at bedtime. 100 Tablet 3    amoxicillin (AMOXIL) 500 MG Cap Take 4 Capsules by mouth 1 time a day as needed (30-45 mins before invasive procedure). 12 Capsule 0    hydrochlorothiazide (MICROZIDE) 12.5 MG capsule Take 1 Capsule by mouth every day. 90 Capsule 3    colchicine (COLCRYS) 0.6 MG Tab Take 0.6-1.2 mg by mouth as " needed (For gout flare ups). Pt takes 1.2MG and then 6 hours later she takes 0.6MG      B Complex Vitamins (B COMPLEX 50 PO) Take 1 Tablet by mouth every day.      alendronate (FOSAMAX) 35 MG tablet Take 35 mg by mouth every 7 days. On Sat      anastrozole (ARIMIDEX) 1 MG Tab Take 1 mg by mouth every day. Take 1 tablet by mouth once daily      albuterol (VENTOLIN OR PROVENTIL) 108 (90 BASE) MCG/ACT AERS Inhale 2 Puffs by mouth every four hours as needed for Shortness of Breath. 1 Inhaler 3    fluticasone-salmeterol (ADVAIR) 100-50 MCG/DOSE AEROSOL POWDER, BREATH ACTIVATED Inhale 1 Puff every evening. Pt is only taking once a day, not BID      Multiple Vitamins-Minerals (ONE-A-DAY 50 PLUS PO) Take 1 Tablet by mouth every day.      Cholecalciferol (VITAMIN D3 PO) Take 1 Capsule by mouth every day.      CALCIUM PO Take 1 Tablet by mouth every day. Pt does not know dose      [DISCONTINUED] acetaminophen (TYLENOL) 500 MG Tab Take 1,000 mg by mouth every 6 hours as needed. Indications: Pain       No facility-administered encounter medications on file as of 3/18/2024.     Review of Systems   Constitutional: Negative.  Negative for chills, fever, malaise/fatigue and weight loss.   HENT: Negative.  Negative for hearing loss.    Eyes: Negative.  Negative for blurred vision and double vision.   Respiratory: Negative.  Negative for cough and shortness of breath.    Cardiovascular: Negative.  Negative for chest pain, palpitations, claudication and leg swelling.   Gastrointestinal: Negative.  Negative for abdominal pain, nausea and vomiting.   Genitourinary: Negative.  Negative for dysuria and urgency.   Musculoskeletal: Negative.  Negative for joint pain and myalgias.   Skin: Negative.  Negative for itching and rash.   Neurological: Negative.  Negative for dizziness, focal weakness, weakness and headaches.   Endo/Heme/Allergies: Negative.  Does not bruise/bleed easily.   Psychiatric/Behavioral: Negative.  Negative for  "depression. The patient is not nervous/anxious.               Objective     /60 (BP Location: Left arm, Patient Position: Sitting, BP Cuff Size: Adult)   Pulse 77   Resp 16   Ht 1.651 m (5' 5\")   Wt 94.8 kg (209 lb)   SpO2 95%   BMI 34.78 kg/m²     Physical Exam  Constitutional:       Appearance: Normal appearance. She is well-developed and normal weight.   HENT:      Head: Normocephalic and atraumatic.   Neck:      Vascular: No JVD.   Cardiovascular:      Rate and Rhythm: Normal rate and regular rhythm.      Heart sounds: Normal heart sounds.   Pulmonary:      Effort: Pulmonary effort is normal.      Breath sounds: Normal breath sounds.   Abdominal:      General: Bowel sounds are normal.      Palpations: Abdomen is soft.      Comments: No hepatosplenomegaly.   Musculoskeletal:         General: Normal range of motion.   Lymphadenopathy:      Cervical: No cervical adenopathy.   Skin:     General: Skin is warm and dry.   Neurological:      Mental Status: She is alert and oriented to person, place, and time.            ARDIAC STUDIES/PROCEDURES:     CAROTID ULTRASOUND (01/10/19)  1.  There is a mild amount of atherosclerotic plaque.  Plaque is located in carotid bulbs and proximal internal carotid arteries.  Plaque characterization:  calcific  2.  There is no evidence of carotid occlusion.  3. Vertebral arteries demonstrate antegrade flow.  4. Diameter reduction in the internal carotid arteries: less than 50%. There is no hemodynamically significant stenosis.     ECHOCARDIOGRAM CONCLUSIONS (11/01/19)  No prior study is available for comparison.   Hyperdynamic left ventricular systolic function.  Left ventricular ejection fraction is visually estimated to be greater than 75%.  No significant valve abnormality.  Unable to estimate RVSP, normal RAP.     EKG performed on (07/16/20) EKG shows sinus rhythm.     HOLTER MONITOR (10/23/19)  48 Hour Holter Summary:   Sinus, normal heart rate range, no pauses.   Rare " PVCs.   Rare PACs.   One 14 beat ventricular run, rate 129 bpm, occurred at 2:25 AM.   No symptoms reported.      Laboratory results of (05/10/23) were reviewed. Cholesterol profile of 58/137/40/91 mg/dL noted.   Laboratory results of (09/28/22) Cholesterol profile of 126/118/36/66 mg/dL noted.  Laboratory results of (12/22/21) Cholesterol profile of 142/112/44/76 mg/dL noted.  Laboratory results of (07/01/21) Cholesterol profile of 158/145/40/89 mg/dL noted.     PET SCAN (12/04/19)  ELECTROCARDIOGRAPHIC FINDINGS:    Normal sinus rhythm.  Normal ECG response to dipyridamole infusion.  No ischemic changes noted   SCINTOGRAPHIC FINDINGS:    Normal left ventricular perfusion with stress and rest images.    There is no evidence of ischemia or scar.   However TID (transient ischemic dialatation) is noted (ratio is 2.0), can be assosiated with balance three vessel disease, correlate clinically.   GATED WALL MOTION FINDINGS:    The left ventricle wall motion is normal with stress and rest  imagings.  Measured resting ejection fraction is 79 %.   stress EF 89%.         Assessment & Plan     1. Essential hypertension        2. Mixed hyperlipidemia            Medical Decision Making: Today's Assessment/Status/Plan:        Hypertension: Blood pressure is well controlled. We will continue with metoprolol, trandolapril.  Hyperlipidemia: She is doing well on statin therapy without myalgia symptoms. She is working on diet modification and exercise.   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     We will follow up the patient in one year.    CC Jeff Arambula

## 2024-05-03 ENCOUNTER — APPOINTMENT (OUTPATIENT)
Dept: RADIOLOGY | Facility: IMAGING CENTER | Age: 74
End: 2024-05-03
Attending: NURSE PRACTITIONER
Payer: MEDICARE

## 2024-05-03 ENCOUNTER — OFFICE VISIT (OUTPATIENT)
Dept: URGENT CARE | Facility: PHYSICIAN GROUP | Age: 74
End: 2024-05-03
Payer: MEDICARE

## 2024-05-03 VITALS
OXYGEN SATURATION: 96 % | RESPIRATION RATE: 18 BRPM | HEART RATE: 98 BPM | SYSTOLIC BLOOD PRESSURE: 136 MMHG | WEIGHT: 210 LBS | TEMPERATURE: 98.4 F | DIASTOLIC BLOOD PRESSURE: 74 MMHG | BODY MASS INDEX: 34.99 KG/M2 | HEIGHT: 65 IN

## 2024-05-03 DIAGNOSIS — M79.89 TOE SWELLING: ICD-10-CM

## 2024-05-03 DIAGNOSIS — M79.675 GREAT TOE PAIN, LEFT: ICD-10-CM

## 2024-05-03 DIAGNOSIS — M10.9 GOUTY ARTHRITIS OF LEFT GREAT TOE: ICD-10-CM

## 2024-05-03 PROCEDURE — 73660 X-RAY EXAM OF TOE(S): CPT | Mod: TC,LT | Performed by: NURSE PRACTITIONER

## 2024-05-03 PROCEDURE — 3078F DIAST BP <80 MM HG: CPT | Performed by: NURSE PRACTITIONER

## 2024-05-03 PROCEDURE — 99214 OFFICE O/P EST MOD 30 MIN: CPT | Performed by: NURSE PRACTITIONER

## 2024-05-03 PROCEDURE — 3075F SYST BP GE 130 - 139MM HG: CPT | Performed by: NURSE PRACTITIONER

## 2024-05-03 RX ORDER — PREDNISONE 20 MG/1
TABLET ORAL
Qty: 11 TABLET | Refills: 0 | Status: SHIPPED | OUTPATIENT
Start: 2024-05-03

## 2024-05-03 RX ORDER — CEPHALEXIN 500 MG/1
500 CAPSULE ORAL 4 TIMES DAILY
Qty: 20 CAPSULE | Refills: 0 | Status: SHIPPED | OUTPATIENT
Start: 2024-05-03 | End: 2024-05-08

## 2024-05-03 ASSESSMENT — ENCOUNTER SYMPTOMS
FEVER: 0
SENSORY CHANGE: 0
CONSTITUTIONAL NEGATIVE: 1
NEUROLOGICAL NEGATIVE: 1
WEAKNESS: 0

## 2024-05-03 ASSESSMENT — VISUAL ACUITY: OU: 1

## 2024-05-03 ASSESSMENT — FIBROSIS 4 INDEX: FIB4 SCORE: 1.03

## 2024-05-04 NOTE — PROGRESS NOTES
Subjective:     Fiona Daniels is a 73 y.o. female who presents for Toe Pain (Left Foot,Big Toe, swelling x 1 week )       Foot Problem  This is a new problem. The problem has been gradually worsening. Pertinent negatives include no fever or weakness.     Outside records reviewed.  Patient seen at [facility] 9/18/2023.  Diagnosed with acute gout of left foot of great toe.  Treated with colchicine.    Patient reports left great toe pain that started 1 week ago.  Gradually worsening now with redness and swelling and warmth.  Initially thought it was gout and took colchicine.  However, symptoms did not improve.  Took Tylenol with minimal relief.  Denies fever, numbness, weakness, or other symptoms.  Denies injury.  Unable to take NSAIDs.    Review of Systems   Constitutional: Negative.  Negative for fever.   Musculoskeletal:         Per HPI   Neurological: Negative.  Negative for sensory change and weakness.   All other systems reviewed and are negative.    Refer to [facility] for additional details.    During this visit, appropriate PPE was worn, and hand hygiene was performed.    PMH:  has a past medical history of Anesthesia, Arthritis (07/16/2020), ASTHMA, Back pain, Breast cancer ([facility]), Bunion, right foot (07/19/2022), Cancer (HCC), Carcinoma of upper-outer quadrant of left breast in female, estrogen receptor positive (HCC) (08/05/2020), Chickenpox, Chronic pain of left knee (07/12/2023), [place] measles, Hepatitis A, High cholesterol, Hyperlipidemia, Hypertension, Mumps, Osteoarthritis of hip (09/13/2021), Osteoarthritis, knee (07/31/2023), Pain (07/16/2020), Pain in right foot (11/22/2022), Painful joint, Positive PPD, and Wears glasses.    MEDS:   Current Outpatient Medications:     predniSONE (DELTASONE) 20 MG Tab, Take 3 tabs daily x 2 days, then 2 tabs daily x 2 days, then 1 tab on final day., Disp: 11 Tablet, Rfl: 0    cephALEXin (KEFLEX) 500 MG Cap, Take 1 Capsule by mouth 4 times a day for 5 days.,  "Disp: 20 Capsule, Rfl: 0    atorvastatin (LIPITOR) 20 MG Tab, Take 1 Tablet by mouth every evening., Disp: 100 Tablet, Rfl: 3    trandolapril (MAVIK) 4 MG Tab, Take 2 Tablets by mouth at bedtime., Disp: 200 Tablet, Rfl: 3    metoprolol SR (TOPROL XL) 25 MG TABLET SR 24 HR, Take 1 Tablet by mouth at bedtime., Disp: 100 Tablet, Rfl: 3    amoxicillin (AMOXIL) 500 MG Cap, Take 4 Capsules by mouth 1 time a day as needed (30-45 mins before invasive procedure)., Disp: 12 Capsule, Rfl: 0    hydrochlorothiazide (MICROZIDE) 12.5 MG capsule, Take 1 Capsule by mouth every day., Disp: 90 Capsule, Rfl: 3    colchicine (COLCRYS) 0.6 MG Tab, Take 0.6-1.2 mg by mouth as needed (For gout flare ups). Pt takes 1.2MG and then 6 hours later she takes 0.6MG, Disp: , Rfl:     B Complex Vitamins (B COMPLEX 50 PO), Take 1 Tablet by mouth every day., Disp: , Rfl:     alendronate (FOSAMAX) 35 MG tablet, Take 35 mg by mouth every 7 days. On Sat, Disp: , Rfl:     anastrozole (ARIMIDEX) 1 MG Tab, Take 1 mg by mouth every day. Take 1 tablet by mouth once daily, Disp: , Rfl:     albuterol (VENTOLIN OR PROVENTIL) 108 (90 BASE) MCG/ACT AERS, Inhale 2 Puffs by mouth every four hours as needed for Shortness of Breath., Disp: 1 Inhaler, Rfl: 3    fluticasone-salmeterol (ADVAIR) 100-50 MCG/DOSE AEROSOL POWDER, BREATH ACTIVATED, Inhale 1 Puff every evening. Pt is only taking once a day, not BID, Disp: , Rfl:     Multiple Vitamins-Minerals (ONE-A-DAY 50 PLUS PO), Take 1 Tablet by mouth every day., Disp: , Rfl:     Cholecalciferol (VITAMIN D3 PO), Take 1 Capsule by mouth every day., Disp: , Rfl:     CALCIUM PO, Take 1 Tablet by mouth every day. Pt does not know dose, Disp: , Rfl:     ALLERGIES:   Allergies   Allergen Reactions    Aspirin Anaphylaxis    Robitussin Dm [Guiatuss Dm] Anaphylaxis    Codeine      \"passed out\"    Moxifloxacin Hcl In Nacl Rash     SURGHX:   Past Surgical History:   Procedure Laterality Date    PB TOTAL KNEE ARTHROPLASTY Left " 09/28/2023    Procedure: LEFT TOTAL KNEE ARTHROPLASTY;  Surgeon: Syd Chapman M.D.;  Location: [facility];  Service: Orthopedics    PB EXPLOR TARSAL/TARSOMETATAR JT Right 12/19/2022    Procedure: RIGHT FIRST INTERPHALANGEAL JOINT DEBRIDEMENT, RIGHT EXCISION GOUT, REPAIRS AS INDICATED;  Surgeon: Bert Mo M.D.;  Location: [facility];  Service: Orthopedics    PB FUSION BIG TOE,MT-P JT Right 09/14/2022    Procedure: RIGHT FIRST METATARSOPHALANGEAL JOINT FUSION;  Surgeon: Bert Mo M.D.;  Location: [facility];  Service: Orthopedics    SD EXCISION TUMOR SOFT TISSUE FOOT/TOE SUBQ * Right 09/14/2022    Procedure: RIGHT FIRST METATARSOPHALANGEAL JOINT EXCISION, RIGHT GREAT TOE EXCISION, REPAIRS AS INDICATED;  Surgeon: Bert Mo M.D.;  Location: [facility];  Service: Orthopedics    SD TOTAL HIP ARTHROPLASTY Right 09/30/2021    Procedure: RIGHT ANTERIOR TOTAL HIP ARTHROPLASTY;  Surgeon: Syd Chapman M.D.;  Location: [facility];  Service: Orthopedics    SD BREAST REDUCTION Right 07/21/2020    Procedure: MAMMOPLASTY, REDUCTION-RIGHT BREAST FOR SYMMETRY;  Surgeon: Steven Schluz M.D.;  Location: [facility];  Service: General    PARTIAL MASTECTOMY Left 07/21/2020    Procedure: MASTECTOMY, PARTIAL- WIRE LOC;  Surgeon: Shy Jean M.D.;  Location: [facility];  Service: General    NODE BIOPSY SENTINEL Left 07/21/2020    Procedure: BIOPSY, LYMPH NODE, SENTINEL;  Surgeon: Shy Jean M.D.;  Location: [facility];  Service: General    AXILLARY NODE DISSECTION Left 07/21/2020    Procedure: LYMPHADENECTOMY, AXILLARY- POSS;  Surgeon: Shy Jean M.D.;  Location: [facility];  Service: General    BREAST RECONSTRUCTION Left 07/21/2020    Procedure: RECONSTRUCTION, BREAST-WITH LOCAL TISSUE REARRANGEMENT;  Surgeon: Steven Schulz M.D.;  Location: [facility];  Service: General    OTHER  2014    sinus    ETHMOIDECTOMY  02/14/2012    Performed by GÓMEZ VALDERRAMA at SURGERY SAME DAY [facility]    NASAL POLYPECTOMY  02/14/2012    Performed by GÓMEZ VALDERRAMA at SURGERY SAME DAY [facility]    NASAL SEPTAL RECONSTRUCTION  02/14/2012    Performed by GÓMEZ VALDERRAMA at SURGERY SAME DAY [facility]    HIP REPLACEMENT, TOTAL      LUMPECTOMY      OTHER ORTHOPEDIC SURGERY      ORIF right wrist    WA RADIATION THERAPY PLAN SIMPLE      SINUSCOPE       SOCHX:  reports that she has never smoked. She has never been exposed to tobacco smoke. She has never used smokeless tobacco. She reports that she does not drink alcohol and does not use drugs.    FH: Per HPI as applicable/pertinent.      Objective:     /74 (BP Location: Left arm, Patient Position: Sitting, BP Cuff Size: Adult)   Pulse 98   Temp 36.9 °C (98.4 °F) (Temporal)   Resp 18   Ht 1.651 m (5' 5\")   Wt 95.3 kg (210 lb)   SpO2 96%   BMI 34.95 kg/m²     Physical Exam  Nursing note reviewed.   Constitutional:       General: She is not in acute distress.     Appearance: She is well-developed. She is not ill-appearing or toxic-appearing.   Eyes:      General: Vision grossly intact.   Cardiovascular:      Rate and Rhythm: Normal rate.      Pulses: Normal pulses.   Pulmonary:      Effort: Pulmonary effort is normal. No respiratory distress.   Musculoskeletal:         General: No deformity. Normal range of motion.      Left foot: Normal range of motion and normal capillary refill. Swelling and tenderness present. No deformity or laceration. Normal pulse.        Feet:       Comments: Erythema, warmth, swelling, TTP over left 1st MTPJ and proximal phalanx   Skin:     General: Skin is warm and dry.      Capillary Refill: Capillary refill takes less than 2 seconds.      Coloration: Skin is not pale.      Findings: Erythema present.   Neurological:      Mental Status: She is alert and oriented to person, " place, and time.      Motor: No weakness.   Psychiatric:         Behavior: Behavior normal. Behavior is cooperative.     X-ray of left great toe:    Details    Reading Physician Reading Date Result Priority   Javier Simental M.D.  [phone] 5/3/2024      Narrative & Impression     5/3/2024 6:17 PM     HISTORY/REASON FOR EXAM:  Pain/Deformity Following Trauma; L great toe.  Left great toe pain     TECHNIQUE/EXAM DESCRIPTION AND NUMBER OF VIEWS:  3 views of the LEFT toes.     COMPARISON: None     FINDINGS:  There is no fracture.     Alignment is normal.     There is soft tissue swelling medial to the 1st metatarsal head. This suggests inflammation and could be gout. There are associated calcifications. No erosions are present.     There is also soft tissue swelling adjacent to the 1st interphalangeal joint.     1st metatarsophalangeal joint and 1st interphalangeal joint degenerative changes are present.     IMPRESSION:     1.  Negative for fracture or malalignment     2.  Soft tissue swelling located medial to the 1st metatarsophalangeal joint and the 1st interphalangeal joint suggesting inflammation, possibly gout     3.  osteoarthritis of the forefoot              Exam Ended: 24  6:30 PM Last Resulted: 24  6:48 PM           Radiology report and images reviewed by myself. Concur with findings.      Assessment/Plan:     1. Great toe pain, left  - DX-TOE(S) 2+ LEFT; Future    2. Toe swelling  - cephALEXin (KEFLEX) 500 MG Cap; Take 1 Capsule by mouth 4 times a day for 5 days.  Dispense: 20 Capsule; Refill: 0    3. Gouty arthritis of left great toe  - predniSONE (DELTASONE) 20 MG Tab; Take 3 tabs daily x 2 days, then 2 tabs daily x 2 days, then 1 tab on final day.  Dispense: 11 Tablet; Refill: 0    Differentials discussed. Likely gout flare, although patient reports symptoms are worse than usual.  Patient recalls colchicine likely  which is possibly why symptoms did not improve.    Rx as above sent  electronically for prednisone for symptom relief. Rest, ice, elevate.    Did consider possible cellulitis given severity of symptoms.  Out of abundance of precaution, patient amenable to empiric antibiotic.    Monitor. Warning signs reviewed. Return precautions advised.     Differential diagnosis, natural history, supportive care, over-the-counter symptom management per 's instructions, close monitoring, and indications for immediate follow-up discussed.     All questions answered. Patient agrees with the plan of care.    Discharge summary provided.     Billing note: moderate complexity and moderate risk. Established patient. [id number]. Please refer to LOS tool for details.

## 2024-05-04 NOTE — PATIENT INSTRUCTIONS
Details    Reading Physician Reading Date Result Priority   Javier Simental M.D.  [phone] 5/3/2024      Narrative & Impression     5/3/2024 6:17 PM     HISTORY/REASON FOR EXAM:  Pain/Deformity Following Trauma; L great toe.  Left great toe pain     TECHNIQUE/EXAM DESCRIPTION AND NUMBER OF VIEWS:  3 views of the LEFT toes.     COMPARISON: None     FINDINGS:  There is no fracture.     Alignment is normal.     There is soft tissue swelling medial to the 1st metatarsal head. This suggests inflammation and could be gout. There are associated calcifications. No erosions are present.     There is also soft tissue swelling adjacent to the 1st interphalangeal joint.     1st metatarsophalangeal joint and 1st interphalangeal joint degenerative changes are present.     IMPRESSION:     1.  Negative for fracture or malalignment     2.  Soft tissue swelling located medial to the 1st metatarsophalangeal joint and the 1st interphalangeal joint suggesting inflammation, possibly gout     3.  osteoarthritis of the forefoot              Exam Ended: 05/03/24  6:30 PM Last Resulted: 05/03/24  6:48 PM

## 2024-08-01 ENCOUNTER — HOSPITAL ENCOUNTER (OUTPATIENT)
Dept: LAB | Facility: MEDICAL CENTER | Age: 74
End: 2024-08-01
Payer: MEDICARE

## 2024-08-01 PROBLEM — M1A.9XX1: Status: ACTIVE | Noted: 2024-08-01

## 2024-08-01 PROBLEM — M1A.9XX1: Chronic | Status: ACTIVE | Noted: 2024-08-01

## 2024-08-01 LAB
ALBUMIN SERPL BCP-MCNC: 3.9 G/DL (ref 3.2–4.9)
ALBUMIN/GLOB SERPL: 1.6 G/DL
ALP SERPL-CCNC: 65 U/L (ref 30–99)
ALT SERPL-CCNC: 18 U/L (ref 2–50)
ANION GAP SERPL CALC-SCNC: 9 MMOL/L (ref 7–16)
AST SERPL-CCNC: 25 U/L (ref 12–45)
BASOPHILS # BLD AUTO: 0.4 % (ref 0–1.8)
BASOPHILS # BLD: 0.02 K/UL (ref 0–0.12)
BILIRUB SERPL-MCNC: 1.3 MG/DL (ref 0.1–1.5)
BUN SERPL-MCNC: 18 MG/DL (ref 8–22)
CALCIUM ALBUM COR SERPL-MCNC: 9.3 MG/DL (ref 8.5–10.5)
CALCIUM SERPL-MCNC: 9.2 MG/DL (ref 8.5–10.5)
CHLORIDE SERPL-SCNC: 105 MMOL/L (ref 96–112)
CHOLEST SERPL-MCNC: 148 MG/DL (ref 100–199)
CO2 SERPL-SCNC: 26 MMOL/L (ref 20–33)
CREAT SERPL-MCNC: 0.66 MG/DL (ref 0.5–1.4)
CREAT UR-MCNC: 20.51 MG/DL
EOSINOPHIL # BLD AUTO: 0.14 K/UL (ref 0–0.51)
EOSINOPHIL NFR BLD: 2.6 % (ref 0–6.9)
ERYTHROCYTE [DISTWIDTH] IN BLOOD BY AUTOMATED COUNT: 50.8 FL (ref 35.9–50)
EST. AVERAGE GLUCOSE BLD GHB EST-MCNC: 117 MG/DL
GFR SERPLBLD CREATININE-BSD FMLA CKD-EPI: 91 ML/MIN/1.73 M 2
GLOBULIN SER CALC-MCNC: 2.4 G/DL (ref 1.9–3.5)
GLUCOSE SERPL-MCNC: 88 MG/DL (ref 65–99)
HBA1C MFR BLD: 5.7 % (ref 4–5.6)
HCT VFR BLD AUTO: 44 % (ref 37–47)
HDLC SERPL-MCNC: 45 MG/DL
HGB BLD-MCNC: 14.7 G/DL (ref 12–16)
IMM GRANULOCYTES # BLD AUTO: 0.02 K/UL (ref 0–0.11)
IMM GRANULOCYTES NFR BLD AUTO: 0.4 % (ref 0–0.9)
LDLC SERPL CALC-MCNC: 75 MG/DL
LYMPHOCYTES # BLD AUTO: 1.59 K/UL (ref 1–4.8)
LYMPHOCYTES NFR BLD: 29.1 % (ref 22–41)
MCH RBC QN AUTO: 33.9 PG (ref 27–33)
MCHC RBC AUTO-ENTMCNC: 33.4 G/DL (ref 32.2–35.5)
MCV RBC AUTO: 101.6 FL (ref 81.4–97.8)
MICROALBUMIN UR-MCNC: <1.2 MG/DL
MICROALBUMIN/CREAT UR: NORMAL MG/G (ref 0–30)
MONOCYTES # BLD AUTO: 0.37 K/UL (ref 0–0.85)
MONOCYTES NFR BLD AUTO: 6.8 % (ref 0–13.4)
NEUTROPHILS # BLD AUTO: 3.33 K/UL (ref 1.82–7.42)
NEUTROPHILS NFR BLD: 60.7 % (ref 44–72)
NRBC # BLD AUTO: 0 K/UL
NRBC BLD-RTO: 0 /100 WBC (ref 0–0.2)
PLATELET # BLD AUTO: 188 K/UL (ref 164–446)
PMV BLD AUTO: 11.1 FL (ref 9–12.9)
POTASSIUM SERPL-SCNC: 4.2 MMOL/L (ref 3.6–5.5)
PROT SERPL-MCNC: 6.3 G/DL (ref 6–8.2)
RBC # BLD AUTO: 4.33 M/UL (ref 4.2–5.4)
SODIUM SERPL-SCNC: 140 MMOL/L (ref 135–145)
TRIGL SERPL-MCNC: 140 MG/DL (ref 0–149)
VIT B12 SERPL-MCNC: 938 PG/ML (ref 211–911)
WBC # BLD AUTO: 5.5 K/UL (ref 4.8–10.8)

## 2024-08-01 PROCEDURE — 85025 COMPLETE CBC W/AUTO DIFF WBC: CPT

## 2024-08-01 PROCEDURE — 80053 COMPREHEN METABOLIC PANEL: CPT

## 2024-08-01 PROCEDURE — 84681 ASSAY OF C-PEPTIDE: CPT

## 2024-08-01 PROCEDURE — 82607 VITAMIN B-12: CPT

## 2024-08-01 PROCEDURE — 80061 LIPID PANEL: CPT

## 2024-08-01 PROCEDURE — 84560 ASSAY OF URINE/URIC ACID: CPT

## 2024-08-01 PROCEDURE — 82043 UR ALBUMIN QUANTITATIVE: CPT

## 2024-08-01 PROCEDURE — 82570 ASSAY OF URINE CREATININE: CPT

## 2024-08-01 PROCEDURE — 83036 HEMOGLOBIN GLYCOSYLATED A1C: CPT | Mod: GA

## 2024-08-01 PROCEDURE — 36415 COLL VENOUS BLD VENIPUNCTURE: CPT

## 2024-08-03 PROBLEM — M20.12 HALLUX VALGUS OF LEFT FOOT: Status: ACTIVE | Noted: 2024-08-03

## 2024-08-03 PROBLEM — Z17.0 CARCINOMA OF UPPER-OUTER QUADRANT OF LEFT BREAST IN FEMALE, ESTROGEN RECEPTOR POSITIVE (HCC): Chronic | Status: ACTIVE | Noted: 2020-08-05

## 2024-08-03 PROBLEM — C50.412 CARCINOMA OF UPPER-OUTER QUADRANT OF LEFT BREAST IN FEMALE, ESTROGEN RECEPTOR POSITIVE (HCC): Chronic | Status: ACTIVE | Noted: 2020-08-05

## 2024-08-03 LAB
C PEPTIDE SERPL-MCNC: 2.3 NG/ML (ref 0.5–3.3)
COLLECT DURATION TIME SPEC: NORMAL HRS
CREAT 24H UR-MCNC: 23 MG/DL
SPECIMEN VOL ?TM UR: NORMAL ML
URATE 24H UR-MCNC: 11.8 MG/DL
URATE 24H UR-MRATE: NORMAL MG/D (ref 250–750)

## 2024-10-03 ENCOUNTER — APPOINTMENT (RX ONLY)
Dept: URBAN - METROPOLITAN AREA CLINIC 6 | Facility: CLINIC | Age: 74
Setting detail: DERMATOLOGY
End: 2024-10-03

## 2024-10-03 DIAGNOSIS — L82.1 OTHER SEBORRHEIC KERATOSIS: ICD-10-CM

## 2024-10-03 DIAGNOSIS — L57.0 ACTINIC KERATOSIS: ICD-10-CM

## 2024-10-03 PROCEDURE — 99212 OFFICE O/P EST SF 10 MIN: CPT | Mod: 25

## 2024-10-03 PROCEDURE — ? COUNSELING

## 2024-10-03 PROCEDURE — 17000 DESTRUCT PREMALG LESION: CPT

## 2024-10-03 PROCEDURE — ? LIQUID NITROGEN

## 2024-10-03 ASSESSMENT — LOCATION DETAILED DESCRIPTION DERM
LOCATION DETAILED: RIGHT MEDIAL BREAST 2-3:00 REGION
LOCATION DETAILED: LEFT MEDIAL TRAPEZIAL NECK
LOCATION DETAILED: RIGHT PROXIMAL DORSAL FOREARM
LOCATION DETAILED: LEFT PROXIMAL POSTERIOR UPPER ARM
LOCATION DETAILED: RIGHT PROXIMAL POSTERIOR UPPER ARM
LOCATION DETAILED: LEFT PROXIMAL DORSAL FOREARM

## 2024-10-03 ASSESSMENT — LOCATION SIMPLE DESCRIPTION DERM
LOCATION SIMPLE: LEFT POSTERIOR UPPER ARM
LOCATION SIMPLE: RIGHT POSTERIOR UPPER ARM
LOCATION SIMPLE: LEFT FOREARM
LOCATION SIMPLE: RIGHT FOREARM
LOCATION SIMPLE: POSTERIOR NECK
LOCATION SIMPLE: RIGHT BREAST

## 2024-10-03 ASSESSMENT — LOCATION ZONE DERM
LOCATION ZONE: ARM
LOCATION ZONE: NECK
LOCATION ZONE: TRUNK

## 2024-10-03 NOTE — PROCEDURE: LIQUID NITROGEN
Render Post-Care Instructions In Note?: no
Detail Level: Detailed
Post-Care Instructions: I reviewed with the patient in detail post-care instructions. Patient is to wear sunprotection, and avoid picking at any of the treated lesions. Pt may apply Vaseline to crusted or scabbing areas.
Show Applicator Variable?: Yes
Duration Of Freeze Thaw-Cycle (Seconds): 3
Consent: The patient's consent was obtained including but not limited to risks of crusting, scabbing, blistering, scarring, darker or lighter pigmentary change, recurrence, incomplete removal and infection.

## 2024-11-08 ENCOUNTER — OFFICE VISIT (OUTPATIENT)
Dept: URGENT CARE | Facility: PHYSICIAN GROUP | Age: 74
End: 2024-11-08
Payer: MEDICARE

## 2024-11-08 VITALS
DIASTOLIC BLOOD PRESSURE: 78 MMHG | HEIGHT: 65 IN | BODY MASS INDEX: 36.15 KG/M2 | SYSTOLIC BLOOD PRESSURE: 138 MMHG | TEMPERATURE: 98.9 F | HEART RATE: 93 BPM | OXYGEN SATURATION: 92 % | RESPIRATION RATE: 18 BRPM | WEIGHT: 217 LBS

## 2024-11-08 DIAGNOSIS — H65.93 MIDDLE EAR EFFUSION, BILATERAL: ICD-10-CM

## 2024-11-08 PROCEDURE — 3075F SYST BP GE 130 - 139MM HG: CPT | Performed by: NURSE PRACTITIONER

## 2024-11-08 PROCEDURE — 94760 N-INVAS EAR/PLS OXIMETRY 1: CPT | Performed by: NURSE PRACTITIONER

## 2024-11-08 PROCEDURE — 99213 OFFICE O/P EST LOW 20 MIN: CPT | Mod: 25 | Performed by: NURSE PRACTITIONER

## 2024-11-08 PROCEDURE — 3078F DIAST BP <80 MM HG: CPT | Performed by: NURSE PRACTITIONER

## 2024-11-08 ASSESSMENT — FIBROSIS 4 INDEX: FIB4 SCORE: 2.35

## 2024-11-08 ASSESSMENT — ENCOUNTER SYMPTOMS
NAUSEA: 0
FEVER: 0
COUGH: 0
MYALGIAS: 0
HEADACHES: 0
SHORTNESS OF BREATH: 0
DIARRHEA: 0
WHEEZING: 0
DIZZINESS: 0
CHILLS: 0

## 2024-11-08 NOTE — PROGRESS NOTES
Subjective     Fiona Daniels is a 74 y.o. female who presents with Ear Fullness (L ear, today)            HPI  New problem.  Patient is a very pleasant 74-year-old female who presents with left ear fullness that began when she woke up this morning.  She reports that she has had congestion however no cough, fever, chills, or ear pain.  She has not taken any medications for this.  She does have a history of asthma and came in with a sat of 89 however on recheck she is up to 92.  Aspirin, Robitussin dm [guiatuss dm], Codeine, and Moxifloxacin hcl in nacl  Current Outpatient Medications on File Prior to Visit   Medication Sig Dispense Refill    atorvastatin (LIPITOR) 20 MG Tab Take 1 Tablet by mouth every evening. 100 Tablet 3    trandolapril (MAVIK) 4 MG Tab Take 2 Tablets by mouth at bedtime. 200 Tablet 3    metoprolol SR (TOPROL XL) 25 MG TABLET SR 24 HR Take 1 Tablet by mouth at bedtime. 100 Tablet 3    hydrochlorothiazide (MICROZIDE) 12.5 MG capsule Take 1 Capsule by mouth every day. 90 Capsule 3    colchicine (COLCRYS) 0.6 MG Tab Take 0.6-1.2 mg by mouth as needed (For gout flare ups). Pt takes 1.2MG and then 6 hours later she takes 0.6MG      B Complex Vitamins (B COMPLEX 50 PO) Take 1 Tablet by mouth every day.      alendronate (FOSAMAX) 35 MG tablet Take 35 mg by mouth every 7 days. On Sat      anastrozole (ARIMIDEX) 1 MG Tab Take 1 mg by mouth every day. Take 1 tablet by mouth once daily      albuterol (VENTOLIN OR PROVENTIL) 108 (90 BASE) MCG/ACT AERS Inhale 2 Puffs by mouth every four hours as needed for Shortness of Breath. 1 Inhaler 3    fluticasone-salmeterol (ADVAIR) 100-50 MCG/DOSE AEROSOL POWDER, BREATH ACTIVATED Inhale 1 Puff every evening. Pt is only taking once a day, not BID      Multiple Vitamins-Minerals (ONE-A-DAY 50 PLUS PO) Take 1 Tablet by mouth every day.      Cholecalciferol (VITAMIN D3 PO) Take 1 Capsule by mouth every day.      CALCIUM PO Take 1 Tablet by mouth every day. Pt does not  "know dose       No current facility-administered medications on file prior to visit.     Social History     Socioeconomic History    Marital status:      Spouse name: Not on file    Number of children: Not on file    Years of education: Not on file    Highest education level: Not on file   Occupational History    Not on file   Tobacco Use    Smoking status: Never     Passive exposure: Never    Smokeless tobacco: Never   Vaping Use    Vaping status: Never Used   Substance and Sexual Activity    Alcohol use: No    Drug use: No    Sexual activity: Not on file   Other Topics Concern    Not on file   Social History Narrative    Not on file     Social Drivers of Health     Financial Resource Strain: Not on file   Food Insecurity: Not on file   Transportation Needs: Not on file   Physical Activity: Not on file   Stress: Not on file   Social Connections: Not on file   Intimate Partner Violence: Not on file   Housing Stability: Not on file       Breast Cancer-related family history is not on file.      Review of Systems   Constitutional:  Negative for chills, fever and malaise/fatigue.   HENT:  Positive for congestion and hearing loss. Negative for ear discharge, ear pain and tinnitus.    Respiratory:  Negative for cough, shortness of breath and wheezing.    Gastrointestinal:  Negative for diarrhea and nausea.   Musculoskeletal:  Negative for myalgias.   Neurological:  Negative for dizziness and headaches.              Objective     /78 (BP Location: Right arm, Patient Position: Sitting, BP Cuff Size: Adult)   Pulse 93   Temp 37.2 °C (98.9 °F) (Temporal)   Resp 18   Ht 1.651 m (5' 5\")   Wt 98.4 kg (217 lb)   SpO2 89%   BMI 36.11 kg/m²      Physical Exam  Vitals and nursing note reviewed.   Constitutional:       Appearance: Normal appearance. She is not ill-appearing.   HENT:      Head: Normocephalic.      Right Ear: A middle ear effusion is present.      Left Ear: A middle ear effusion is present.      " Nose: Congestion and rhinorrhea present.   Cardiovascular:      Rate and Rhythm: Normal rate and regular rhythm.      Heart sounds: No murmur heard.  Pulmonary:      Effort: Pulmonary effort is normal.      Breath sounds: Normal breath sounds.   Musculoskeletal:         General: Normal range of motion.   Skin:     General: Skin is warm and dry.   Neurological:      General: No focal deficit present.      Mental Status: She is alert and oriented to person, place, and time.                             Assessment & Plan        Assessment & Plan  Middle ear effusion, bilateral  Patient reassured that there is nothing in her ear at this time.  She does have bilateral middle ear effusion.  I have advised her to use her Nasacort that she has at home.  She is to follow-up if she develops any pain.

## 2024-11-15 ENCOUNTER — HOSPITAL ENCOUNTER (OUTPATIENT)
Dept: RADIOLOGY | Facility: MEDICAL CENTER | Age: 74
End: 2024-11-15
Attending: INTERNAL MEDICINE
Payer: MEDICARE

## 2024-11-15 DIAGNOSIS — Z79.811 USE OF AROMATASE INHIBITORS: ICD-10-CM

## 2024-11-15 DIAGNOSIS — M81.0 AGE-RELATED OSTEOPOROSIS WITHOUT CURRENT PATHOLOGICAL FRACTURE: ICD-10-CM

## 2024-11-15 DIAGNOSIS — C50.812 MALIGNANT NEOPLASM OF MIDLINE OF BREAST, LEFT (HCC): ICD-10-CM

## 2024-11-15 DIAGNOSIS — M85.80 OSTEOPENIA OF THE ELDERLY: ICD-10-CM

## 2024-11-15 PROCEDURE — 77080 DXA BONE DENSITY AXIAL: CPT

## 2024-11-27 ENCOUNTER — OFFICE VISIT (OUTPATIENT)
Dept: URGENT CARE | Facility: PHYSICIAN GROUP | Age: 74
End: 2024-11-27
Payer: MEDICARE

## 2024-11-27 VITALS
OXYGEN SATURATION: 96 % | RESPIRATION RATE: 16 BRPM | HEART RATE: 76 BPM | BODY MASS INDEX: 37.82 KG/M2 | SYSTOLIC BLOOD PRESSURE: 126 MMHG | TEMPERATURE: 98.1 F | HEIGHT: 65 IN | WEIGHT: 227 LBS | DIASTOLIC BLOOD PRESSURE: 72 MMHG

## 2024-11-27 DIAGNOSIS — H10.9 BACTERIAL CONJUNCTIVITIS: ICD-10-CM

## 2024-11-27 PROCEDURE — 3074F SYST BP LT 130 MM HG: CPT

## 2024-11-27 PROCEDURE — 99213 OFFICE O/P EST LOW 20 MIN: CPT

## 2024-11-27 PROCEDURE — 3078F DIAST BP <80 MM HG: CPT

## 2024-11-27 RX ORDER — POLYETHYLENE GLYCOL 3350, SODIUM CHLORIDE, SODIUM BICARBONATE, POTASSIUM CHLORIDE 420; 11.2; 5.72; 1.48 G/4L; G/4L; G/4L; G/4L
POWDER, FOR SOLUTION ORAL
COMMUNITY
Start: 2024-09-09 | End: 2024-11-27

## 2024-11-27 RX ORDER — ERYTHROMYCIN 5 MG/G
OINTMENT OPHTHALMIC
Qty: 3.5 G | Refills: 0 | Status: SHIPPED | OUTPATIENT
Start: 2024-11-27

## 2024-11-27 RX ORDER — CALCIUM CARBONATE 750 MG/1
TABLET, CHEWABLE ORAL
COMMUNITY
End: 2024-11-27

## 2024-11-27 ASSESSMENT — ENCOUNTER SYMPTOMS
COUGH: 0
EYE PAIN: 0
EYE DISCHARGE: 1
FEVER: 0
EYE REDNESS: 1
BLURRED VISION: 0

## 2024-11-27 ASSESSMENT — FIBROSIS 4 INDEX: FIB4 SCORE: 2.35

## 2024-11-27 NOTE — PROGRESS NOTES
"Chief Complaint   Patient presents with    Conjunctivitis     Left eye, poss pink eye, Mucus x 2 days        HISTORY OF PRESENT ILLNESS: Patient is a pleasant 74 y.o. female who presents to urgent care today noted left eye redness and drainage for the last 2 days    Patient Active Problem List    Diagnosis Date Noted    Hallux valgus of left foot 08/03/2024    Chronic gout involving toe with tophus 08/01/2024    Caregiver role strain 01/25/2024    Primary osteoarthritis of left knee 07/31/2023    Vaccine counseling 07/14/2023    Preventative health care 07/14/2023    Pain in left foot 07/14/2023    Pulmonary nodules 08/25/2022    Atherosclerosis of both carotid arteries 12/06/2021    Carcinoma of upper-outer quadrant of left breast in female, estrogen receptor positive (HCC) 08/05/2020    Obesity (BMI 30-39.9) 07/21/2020    Mild asthma 07/21/2020    History of sinus tachycardia 10/23/2019    Hypokalemia 04/10/2012    Essential hypertension 04/10/2012    Mixed hyperlipidemia 04/10/2012       Allergies:Aspirin, Robitussin dm [guiatuss dm], Codeine, and Moxifloxacin hcl in nacl    Current Outpatient Medications Ordered in Epic   Medication Sig Dispense Refill    erythromycin 5 MG/GM Ointment Instill 1 cm ribbon onto lower eyelid every 6 hours while awake x7 days. 3.5 g 0    trandolapril (MAVIK) 4 MG Tab Take 2 Tablets by mouth at bedtime. 200 Tablet 3    hydrochlorothiazide (MICROZIDE) 12.5 MG capsule Take 1 Capsule by mouth every day. 90 Capsule 3    alendronate (FOSAMAX) 35 MG tablet Take 35 mg by mouth every 7 days. On Sat      albuterol (VENTOLIN OR PROVENTIL) 108 (90 BASE) MCG/ACT AERS Inhale 2 Puffs by mouth every four hours as needed for Shortness of Breath. 1 Inhaler 3     No current Epic-ordered facility-administered medications on file.       Past Medical History:   Diagnosis Date    Anesthesia     mother  \"dead for a minute\"    Arthritis 07/16/2020    hx Gout    ASTHMA     well managed    Back pain     " "Breast cancer (HCC)     Bunion, right foot 07/19/2022    Added automatically from request for surgery [id number]    Cancer (HCC)     left partial masecteomy and 5 lymph nodes removed - June/July 2020    Carcinoma of upper-outer quadrant of left breast in female, estrogen receptor positive (HCC) 08/05/2020    Chickenpox     Chronic pain of left knee 07/12/2023    Spanish measles     Hepatitis A     age 6    High cholesterol     Hyperlipidemia     Hypertension     Mumps     Osteoarthritis of hip 09/13/2021    Added automatically from request for surgery [id number]    Osteoarthritis, knee 07/31/2023    Pain 07/16/2020    back pain    Pain in right foot 11/22/2022    Added automatically from request for surgery [id number]    Painful joint     Positive PPD     Wears glasses        Social History     Tobacco Use    Smoking status: Never     Passive exposure: Never    Smokeless tobacco: Never   Vaping Use    Vaping status: Never Used   Substance Use Topics    Alcohol use: No    Drug use: No       Family Status   Relation Name Status    Mo Mother (Not Specified)    Fa  (Not Specified)   No partnership data on file     Family History   Problem Relation Age of Onset    Cancer Mother         Rectal Cancer    Hypertension Mother     Heart Disease Father     Heart Attack Father        Review of Systems   Constitutional:  Negative for fever.   HENT:  Negative for congestion.    Eyes:  Positive for discharge and redness. Negative for blurred vision and pain.   Respiratory:  Negative for cough.        Exam:  /72 (BP Location: Left arm, Patient Position: Sitting, BP Cuff Size: Adult)   Pulse 76   Temp 36.7 °C (98.1 °F) (Temporal)   Resp 16   Ht 1.651 m (5' 5\")   Wt 103 kg (227 lb)   SpO2 96%   Physical Exam  Vitals reviewed.   Constitutional:       General: She is not in acute distress.     Appearance: Normal appearance. She is normal weight.   HENT:      Head: Normocephalic.      Nose: Nose normal.      Mouth/Throat:      Mouth: " Mucous membranes are moist.      Pharynx: Oropharynx is clear.   Eyes:      General:         Right eye: No discharge.         Left eye: Discharge present.     Extraocular Movements: Extraocular movements intact.      Conjunctiva/sclera:      Right eye: Right conjunctiva is not injected.      Left eye: Left conjunctiva is injected.      Pupils: Pupils are equal, round, and reactive to light.   Cardiovascular:      Rate and Rhythm: Normal rate and regular rhythm.      Pulses: Normal pulses.   Pulmonary:      Effort: Pulmonary effort is normal.   Musculoskeletal:         General: Normal range of motion.      Cervical back: Normal range of motion.   Skin:     General: Skin is warm and dry.   Neurological:      General: No focal deficit present.      Mental Status: She is alert.   Psychiatric:         Mood and Affect: Mood normal.         Assessment/Plan:  1. Bacterial conjunctivitis  - erythromycin 5 MG/GM Ointment; Instill 1 cm ribbon onto lower eyelid every 6 hours while awake x7 days.  Dispense: 3.5 g; Refill: 0    Based on physical exam along with review of systems I did send patient home on [place].  They do not wear contacts.  Advised of administration.    Supportive care, differential diagnoses, and indications for immediate follow-up discussed with patient.   Pathogenesis of diagnosis discussed including typical length and natural progression.   Instructed to return to clinic or nearest emergency department for any change in condition, further concerns, or worsening of symptoms.  Patient states understanding of the plan of care and discharge instructions.  Instructed to make an appointment, for follow up, with primary care provider.    Please note that this dictation was created using voice recognition software. I have made every reasonable attempt to correct obvious errors, but I expect that there are errors of grammar and possibly content that I did not discover before finalizing the note.      Gricel Guzman  APRN

## 2025-01-20 ENCOUNTER — GYNECOLOGY VISIT (OUTPATIENT)
Dept: OBGYN | Facility: CLINIC | Age: 75
End: 2025-01-20
Payer: MEDICARE

## 2025-01-20 ENCOUNTER — HOSPITAL ENCOUNTER (OUTPATIENT)
Facility: MEDICAL CENTER | Age: 75
End: 2025-01-20
Attending: STUDENT IN AN ORGANIZED HEALTH CARE EDUCATION/TRAINING PROGRAM
Payer: MEDICARE

## 2025-01-20 VITALS
WEIGHT: 220 LBS | HEIGHT: 66 IN | DIASTOLIC BLOOD PRESSURE: 83 MMHG | BODY MASS INDEX: 35.36 KG/M2 | SYSTOLIC BLOOD PRESSURE: 161 MMHG

## 2025-01-20 DIAGNOSIS — R39.9 UTI SYMPTOMS: ICD-10-CM

## 2025-01-20 DIAGNOSIS — N94.89 VAGINAL BURNING: ICD-10-CM

## 2025-01-20 DIAGNOSIS — Z76.89 ENCOUNTER TO ESTABLISH CARE: ICD-10-CM

## 2025-01-20 LAB
APPEARANCE UR: CLEAR
BILIRUB UR STRIP-MCNC: NORMAL MG/DL
COLOR UR AUTO: YELLOW
GLUCOSE UR STRIP.AUTO-MCNC: NORMAL MG/DL
KETONES UR STRIP.AUTO-MCNC: NORMAL MG/DL
LEUKOCYTE ESTERASE UR QL STRIP.AUTO: NORMAL
NITRITE UR QL STRIP.AUTO: NORMAL
PH UR STRIP.AUTO: 6.5 [PH] (ref 5–8)
PROT UR QL STRIP: NORMAL MG/DL
RBC UR QL AUTO: NORMAL
SP GR UR STRIP.AUTO: 1.02
UROBILINOGEN UR STRIP-MCNC: 0.2 MG/DL

## 2025-01-20 PROCEDURE — 87480 CANDIDA DNA DIR PROBE: CPT

## 2025-01-20 PROCEDURE — 81002 URINALYSIS NONAUTO W/O SCOPE: CPT | Performed by: STUDENT IN AN ORGANIZED HEALTH CARE EDUCATION/TRAINING PROGRAM

## 2025-01-20 PROCEDURE — 99203 OFFICE O/P NEW LOW 30 MIN: CPT | Performed by: STUDENT IN AN ORGANIZED HEALTH CARE EDUCATION/TRAINING PROGRAM

## 2025-01-20 PROCEDURE — 87660 TRICHOMONAS VAGIN DIR PROBE: CPT

## 2025-01-20 PROCEDURE — 3077F SYST BP >= 140 MM HG: CPT | Performed by: STUDENT IN AN ORGANIZED HEALTH CARE EDUCATION/TRAINING PROGRAM

## 2025-01-20 PROCEDURE — 87510 GARDNER VAG DNA DIR PROBE: CPT

## 2025-01-20 PROCEDURE — 87086 URINE CULTURE/COLONY COUNT: CPT

## 2025-01-20 PROCEDURE — 3079F DIAST BP 80-89 MM HG: CPT | Performed by: STUDENT IN AN ORGANIZED HEALTH CARE EDUCATION/TRAINING PROGRAM

## 2025-01-20 ASSESSMENT — FIBROSIS 4 INDEX: FIB4 SCORE: 2.35

## 2025-01-20 NOTE — PROGRESS NOTES
"ANNUAL GYNECOLOGY VISIT    HPI:  Patient is a 74 y.o.  who presents to [facility] care. She is c/o dysuria and vaginal burning/itching.  Started 3-4 weeks ago and then resolved. Then started noticing symptoms approximately 2 weeks. Denies any vaginal discharge or odor. Denies any hematuria. Denies urinary urgency, frequency.  Denies any hx of same.        PREVENTIVE CARE:  Immunization History   Administered Date(s) Administered    Covid-19 Mrna (Spikevax) Moderna 12+ Years 10/15/2023    INFLUENZA TIV (IM) 2011, 2012, 10/22/2013    Influenza Vac Subunit Quad Inj (Pf) 10/09/2022, 10/15/2023    Influenza Vaccine Adult HD 2015, 10/21/2016, 2017    Influenza Vaccine Quad Inj (Pf) 10/21/2014, 2018, 2021    MODERNA BIVALENT BOOSTER SARS-COV-2 VACCINE (6+) 10/09/2022    Pneumococcal Conjugate Vaccine (Prevnar/PCV-13) 10/21/2016    Pneumococcal polysaccharide vaccine (PPSV-23) 10/10/2010    RSV AREXVY VACCINE 10/15/2023       Last Colon CA screen: UTD- just had this done approximately 3-4 weeks ago  Last Mammogram: 3/12/2024  Last DEXA: 11/15/2024  Taking Calcium/Vit D Supp: taking daily  Personal Hx of fracture as an adult: No  Hx of fracture in first-degree relative: No   race: Yes  Dementia: No  Poor health/frailty: No  Recurrent falls: No  Tobacco use: No  Low body weight (<127 lbs): No  Early menopause (age <45) or BSO: Yes  Alcoholism:No  Inadequate physical activity: No        CANCER RISK ASSESSMENT:  Family history of:   - Breast cancer: pt with personal hx of breast cancer   - Ovarian cancer: no   - Uterine cancer: no   - Colon cancer: mother    GYN HX and ROS:   Last Pap: 64  Failed to redirect to the Timeline version of the REVFS SmartLink.  Hx Mod or Severe Dysplasia : No  Age at Menopause: late 30s/early 40s  Sexually Active: no  Currently in a relationship: yes -  with  who suffers from alzheimers. Currently is \"stable\"  Feel safe in your current " "relationship: yes     Postmenopausal bleeding: No  Sexual problems: No  Significant pelvic pain: No  Bothersome menopausal symptoms: No      ROS:    Positive ROS: vaginal irritation/burning  General: She denies excessive fatigue, weakness, unexplained weight loss or gain, abnormal thirst, unexplained fever/chills.  Neurologic: She denies fainting spells, convulsions, dizzy spells, frequent severe headaches, depression or anxiety or vision changes.  HEENT: She denies unusual skin moles, persistent swollen glands, pain or stiff neck, goiter or lump in her neck.  Heart / Lung: She denies persistent cough, shortness of breath, severe chest pain or recurrent heart flutters.  Breast: She denies breast discharge, pain, lump or lump under her arm.  Gastrointestinal: She denies bloody stools, loss of appetite, change in bowel habits, constipation, diarrhea, nausea, vomiting or persistent abdominal pain.  Urinary: She denies dysuria, urgency, frequency, incontinence, hematuria, kidney or bladder infections.  Extremeties: She denies joint pain, persistently swollen ankles or recurrent leg cramps.        OBSTETRIC HISTORY:  OB History    Para Term  AB Living   0 0 0 0 0 0   SAB IAB Ectopic Molar Multiple Live Births   0 0 0 0 0 0       MEDICAL HISTORY:  Past Medical History:   Diagnosis Date    Anesthesia     mother  \"dead for a minute\"    Arthritis 2020    hx Gout    ASTHMA     well managed    Back pain     Breast cancer (HCC)     Bunion, right foot 2022    Added automatically from request for surgery [id number]    Cancer (HCC)     left partial masecteomy and 5 lymph nodes removed - 2020    Carcinoma of upper-outer quadrant of left breast in female, estrogen receptor positive (HCC) 2020    Chickenpox     Chronic pain of left knee 2023    Guyanese measles     Hepatitis A     age 6    High cholesterol     Hyperlipidemia     Hypertension     Mumps     Osteoarthritis of hip 2021    " "Added automatically from request for surgery [id number]    Osteoarthritis, knee 07/31/2023    Pain 07/16/2020    back pain    Pain in right foot 11/22/2022    Added automatically from request for surgery [id number]    Painful joint     Positive PPD     Wears glasses        MEDICATIONS:  Current Outpatient Medications   Medication Sig    trandolapril (MAVIK) 4 MG Tab Take 2 Tablets by mouth at bedtime.    hydrochlorothiazide (MICROZIDE) 12.5 MG capsule Take 1 Capsule by mouth every day.    alendronate (FOSAMAX) 35 MG tablet Take 35 mg by mouth every 7 days. On Sat    atorvastatin (LIPITOR) 10 MG Tab 90    colchicine (COLCRYS) 0.6 MG Tab 10    alendronate (FOSAMAX) 5 MG tablet 0    hydrochlorothiazide (MICROZIDE) 12.5 MG capsule 90    trandolapril (MAVIK) 4 MG Tab 180    erythromycin 5 MG/GM Ointment Instill 1 cm ribbon onto lower eyelid every 6 hours while awake x7 days.    albuterol (VENTOLIN OR PROVENTIL) 108 (90 BASE) MCG/ACT AERS Inhale 2 Puffs by mouth every four hours as needed for Shortness of Breath.       ALLERGIES / REACTIONS:  Allergies   Allergen Reactions    Aspirin Anaphylaxis    Robitussin Dm [Guiatuss Dm] Anaphylaxis    Codeine      \"passed out\"    Moxifloxacin Hcl In Nacl Rash       FAMILY HISTORY:  Family History   Problem Relation Age of Onset    Diabetes Mother     Cancer Mother         Rectal Cancer    Hypertension Mother     Colon Cancer Mother     Heart Disease Father     Heart Attack Father        SURGICAL HISTORY:  Past Surgical History:   Procedure Laterality Date    PB TOTAL KNEE ARTHROPLASTY Left 09/28/2023    Procedure: LEFT TOTAL KNEE ARTHROPLASTY;  Surgeon: Syd Chapman M.D.;  Location: [facility] [place];  Service: Orthopedics    PB EXPLOR TARSAL/TARSOMETATAR JT Right 12/19/2022    Procedure: RIGHT FIRST INTERPHALANGEAL JOINT DEBRIDEMENT, RIGHT EXCISION GOUT, REPAIRS AS INDICATED;  Surgeon: Bert Mo M.D.;  Location: [facility] [place];  Service: Orthopedics    " PB FUSION BIG TOE,MT-P JT Right 09/14/2022    Procedure: RIGHT FIRST METATARSOPHALANGEAL JOINT FUSION;  Surgeon: Bert Mo M.D.;  Location: [facility];  Service: Orthopedics    TN EXCISION TUMOR SOFT TISSUE FOOT/TOE SUBQ * Right 09/14/2022    Procedure: RIGHT FIRST METATARSOPHALANGEAL JOINT EXCISION, RIGHT GREAT TOE EXCISION, REPAIRS AS INDICATED;  Surgeon: Bert Mo M.D.;  Location: [facility];  Service: Orthopedics    TN TOTAL HIP ARTHROPLASTY Right 09/30/2021    Procedure: RIGHT ANTERIOR TOTAL HIP ARTHROPLASTY;  Surgeon: Syd Chapman M.D.;  Location: [facility];  Service: Orthopedics    TN BREAST REDUCTION Right 07/21/2020    Procedure: MAMMOPLASTY, REDUCTION-RIGHT BREAST FOR SYMMETRY;  Surgeon: Steven Schulz M.D.;  Location: [facility];  Service: General    PARTIAL MASTECTOMY Left 07/21/2020    Procedure: MASTECTOMY, PARTIAL- WIRE LOC;  Surgeon: Shy Jean M.D.;  Location: [facility];  Service: General    NODE BIOPSY SENTINEL Left 07/21/2020    Procedure: BIOPSY, LYMPH NODE, SENTINEL;  Surgeon: Shy Jean M.D.;  Location: [facility];  Service: General    AXILLARY NODE DISSECTION Left 07/21/2020    Procedure: LYMPHADENECTOMY, AXILLARY- POSS;  Surgeon: Shy Jean M.D.;  Location: [facility];  Service: General    BREAST RECONSTRUCTION Left 07/21/2020    Procedure: RECONSTRUCTION, BREAST-WITH LOCAL TISSUE REARRANGEMENT;  Surgeon: Steven Schulz M.D.;  Location: [facility];  Service: General    OTHER  2014    sinus    ETHMOIDECTOMY  02/14/2012    Performed by GÓMEZ VALDERRAMA at SURGERY SAME DAY [facility]    NASAL POLYPECTOMY  02/14/2012    Performed by GÓMEZ VALDERRAMA at SURGERY SAME DAY [facility]    NASAL SEPTAL RECONSTRUCTION  02/14/2012    Performed by GÓMEZ VALDERRAMA at SURGERY SAME DAY [facility]    HIP REPLACEMENT, TOTAL      LUMPECTOMY       "OTHER ORTHOPEDIC SURGERY      ORIF right wrist    MO RADIATION THERAPY PLAN SIMPLE      SINUSCOPE         SOCIAL HISTORY:  Social History     Tobacco Use    Smoking status: Never     Passive exposure: Never    Smokeless tobacco: Never   Vaping Use    Vaping status: Never Used   Substance Use Topics    Alcohol use: No    Drug use: No        PHYSICAL EXAMINATION:  Vital Signs: BP (!) 161/83   Ht 5' 6\"   Wt 220 lb   BMI 35.51 kg/m²    Constitutional: The patient is well developed and well nourished.  Psychiatric: Patient is oriented to time place and person.   Skin: No rash observed.  Neck: Appears symmetric. There are no masses or adenopathy present.  Cardiovascular: Regular rate and rhythm without murmur.  Lungs: Clear to ausculation.  Breast: Inspection reveals no asymetry or nipple discharge, no skin thickening, dimpling or erythema.  Palpation demonstrates no masses.  Abdomen: Soft, non-tender.  Pelvic Exam:       Vulva: External female genitalia within normal limits. No lesions. Mild erythema noted      Urethra - no lesions, no erythema      Vagina: moist, pale pink, decreased ruggae. No vaginal discharge or odor noted. Vaginal swab collected.      Cervix: pink, smooth, no lesions, no CMT      Uterus - non-tender, normal size, shape, contour, mobile, anteverted      Ovaries: non-tender, no appreciable masses    Pap Smear Performed: {No    Chaperone Present: Ngozi Gonzales MA  Extremeties: Legs are symmetric and without tenderness. There is no edema present.      ASSESSMENT AND PLAN:  74 y.o. .here for annual exam    Assessment & Plan  Encounter to establish care  Pt here to establish care today. She has a PCP (Jeff Chau) who takes care of her chronic medical conditions. She is UTD on her mammogram. Hx of breast cancer and is folloed by Dr. Ferrari. Has next follow up 2025 and this will be her 5 year anniversary of her breast cancer.        UTI symptoms    Orders:    POCT Urinalysis    URINE " CULTURE(NEW); Future    Vaginal burning  C/o vaginal burning/itching x 3-4 weeks. It did resolved spontaneously, but then returned 2 weeks ago. Has not tried anything. Mild discomfort with urination. Denies any vaginal discharge/odor, urinary urgency/frequency, lower abdominal pain, n/v/d, f/c. No hx of same previously. On exam I did not appreciate any vaginal discharge or odor. External genitalia with mild erythema present. No lesions. Vaginal swab collected and urinalysis was done here in the clinic. POCT urinalysis was negative. Will send for a culture. If urinalysis and vaginal swab is negative then will discuss starting her on a topical ointment.   Orders:    VAGINAL PATHOGENS DNA PANEL; Future        Follow up: Annually    Yadira Peterson P.A.-C.

## 2025-01-20 NOTE — PROGRESS NOTES
NP/ est care  CC vaginal burning with urination and itching  PHONE # VERIFIED  PHARMACY VERIFIED

## 2025-01-21 LAB
CANDIDA DNA VAG QL PROBE+SIG AMP: NEGATIVE
G VAGINALIS DNA VAG QL PROBE+SIG AMP: NEGATIVE
T VAGINALIS DNA VAG QL PROBE+SIG AMP: NEGATIVE

## 2025-01-22 LAB
BACTERIA UR CULT: NORMAL
SIGNIFICANT IND 70042: NORMAL
SITE SITE: NORMAL
SOURCE SOURCE: NORMAL

## 2025-02-03 PROBLEM — R53.83 OTHER FATIGUE: Chronic | Status: ACTIVE | Noted: 2025-02-03

## 2025-02-03 PROBLEM — R53.83 OTHER FATIGUE: Status: ACTIVE | Noted: 2025-02-03

## 2025-02-03 PROBLEM — G56.01 RIGHT CARPAL TUNNEL SYNDROME: Chronic | Status: ACTIVE | Noted: 2025-02-03

## 2025-02-03 PROBLEM — M77.11 LATERAL EPICONDYLITIS OF RIGHT ELBOW: Chronic | Status: ACTIVE | Noted: 2025-02-03

## 2025-02-03 PROBLEM — M77.11 LATERAL EPICONDYLITIS OF RIGHT ELBOW: Status: ACTIVE | Noted: 2025-02-03

## 2025-02-03 PROBLEM — G56.01 RIGHT CARPAL TUNNEL SYNDROME: Status: ACTIVE | Noted: 2025-02-03

## 2025-02-06 ENCOUNTER — APPOINTMENT (OUTPATIENT)
Dept: URBAN - METROPOLITAN AREA CLINIC 6 | Facility: CLINIC | Age: 75
Setting detail: DERMATOLOGY
End: 2025-02-06

## 2025-02-06 DIAGNOSIS — L81.4 OTHER MELANIN HYPERPIGMENTATION: ICD-10-CM

## 2025-02-06 DIAGNOSIS — L82.1 OTHER SEBORRHEIC KERATOSIS: ICD-10-CM

## 2025-02-06 DIAGNOSIS — Z71.89 OTHER SPECIFIED COUNSELING: ICD-10-CM

## 2025-02-06 DIAGNOSIS — D22 MELANOCYTIC NEVI: ICD-10-CM

## 2025-02-06 DIAGNOSIS — D18.0 HEMANGIOMA: ICD-10-CM

## 2025-02-06 DIAGNOSIS — L57.0 ACTINIC KERATOSIS: ICD-10-CM

## 2025-02-06 DIAGNOSIS — L82.0 INFLAMED SEBORRHEIC KERATOSIS: ICD-10-CM

## 2025-02-06 DIAGNOSIS — L23.89 ALLERGIC CONTACT DERMATITIS DUE TO OTHER AGENTS: ICD-10-CM | Status: INADEQUATELY CONTROLLED

## 2025-02-06 PROBLEM — D18.01 HEMANGIOMA OF SKIN AND SUBCUTANEOUS TISSUE: Status: ACTIVE | Noted: 2025-02-06

## 2025-02-06 PROBLEM — D22.5 MELANOCYTIC NEVI OF TRUNK: Status: ACTIVE | Noted: 2025-02-06

## 2025-02-06 PROCEDURE — 99213 OFFICE O/P EST LOW 20 MIN: CPT | Mod: 25

## 2025-02-06 PROCEDURE — 17110 DESTRUCTION B9 LES UP TO 14: CPT

## 2025-02-06 PROCEDURE — ? LIQUID NITROGEN

## 2025-02-06 PROCEDURE — ? SUNSCREEN TREATMENT REGIMEN

## 2025-02-06 PROCEDURE — ? DIAGNOSIS COMMENT

## 2025-02-06 PROCEDURE — ? ADDITIONAL NOTES

## 2025-02-06 PROCEDURE — ? PRESCRIPTION

## 2025-02-06 PROCEDURE — ? COUNSELING

## 2025-02-06 PROCEDURE — ? PHOTO-DOCUMENTATION

## 2025-02-06 PROCEDURE — 17003 DESTRUCT PREMALG LES 2-14: CPT | Mod: 59

## 2025-02-06 PROCEDURE — 17000 DESTRUCT PREMALG LESION: CPT | Mod: 59

## 2025-02-06 RX ORDER — DOXYCYCLINE HYCLATE 100 MG/1
1 CAPSULE, GELATIN COATED ORAL BID
Qty: 14 | Refills: 0 | Status: ERX | COMMUNITY
Start: 2025-02-06

## 2025-02-06 RX ADMIN — DOXYCYCLINE HYCLATE 1: 100 CAPSULE, GELATIN COATED ORAL at 00:00

## 2025-02-06 ASSESSMENT — LOCATION DETAILED DESCRIPTION DERM
LOCATION DETAILED: LEFT MEDIAL UPPER BACK
LOCATION DETAILED: STERNAL NOTCH
LOCATION DETAILED: LEFT INFERIOR FOREHEAD
LOCATION DETAILED: LEFT PROXIMAL DORSAL FOREARM
LOCATION DETAILED: RIGHT INFERIOR UPPER BACK
LOCATION DETAILED: EPIGASTRIC SKIN
LOCATION DETAILED: PERIUMBILICAL SKIN
LOCATION DETAILED: RIGHT DORSAL WRIST
LOCATION DETAILED: RIGHT DISTAL DORSAL FOREARM
LOCATION DETAILED: LEFT RADIAL DORSAL HAND
LOCATION DETAILED: RIGHT RADIAL DORSAL HAND
LOCATION DETAILED: RIGHT PROXIMAL DORSAL FOREARM
LOCATION DETAILED: LEFT DISTAL DORSAL FOREARM
LOCATION DETAILED: LEFT ULNAR DORSAL HAND
LOCATION DETAILED: SUPERIOR LUMBAR SPINE
LOCATION DETAILED: RIGHT LATERAL PROXIMAL UPPER ARM
LOCATION DETAILED: MIDDLE STERNUM

## 2025-02-06 ASSESSMENT — LOCATION SIMPLE DESCRIPTION DERM
LOCATION SIMPLE: RIGHT UPPER ARM
LOCATION SIMPLE: LEFT UPPER BACK
LOCATION SIMPLE: LEFT FOREARM
LOCATION SIMPLE: LEFT HAND
LOCATION SIMPLE: CHEST
LOCATION SIMPLE: RIGHT HAND
LOCATION SIMPLE: RIGHT UPPER BACK
LOCATION SIMPLE: ABDOMEN
LOCATION SIMPLE: RIGHT FOREARM
LOCATION SIMPLE: LOWER BACK
LOCATION SIMPLE: LEFT FOREHEAD
LOCATION SIMPLE: RIGHT WRIST

## 2025-02-06 ASSESSMENT — LOCATION ZONE DERM
LOCATION ZONE: TRUNK
LOCATION ZONE: FACE
LOCATION ZONE: HAND
LOCATION ZONE: ARM

## 2025-02-06 ASSESSMENT — SEVERITY ASSESSMENT 2020: SEVERITY 2020: MILD

## 2025-02-06 NOTE — PROCEDURE: LIQUID NITROGEN
Show Topical Anesthesia Variable?: Yes
Consent: The patient's verbal consent was obtained including but not limited to risks of crusting, scabbing, blistering, scarring, darker or lighter pigmentary change, recurrence, incomplete removal and infection.
Render Note In Bullet Format When Appropriate: No
Medical Necessity Information: It is in your best interest to select a reason for this procedure from the list below. All of these items fulfill various CMS LCD requirements except the new and changing color options.
Number Of Freeze-Thaw Cycles: 3 freeze-thaw cycles
Detail Level: Detailed
Medical Necessity Clause: This procedure was medically necessary because the lesions that were treated were:
Spray Paint Text: The liquid nitrogen was applied to the skin utilizing a spray paint frosting technique.
Post-Care Instructions: I reviewed with the patient in detail post-care instructions. Patient is to wear sunprotection, and avoid picking at any of the treated lesions. Pt may apply Vaseline to crusted or scabbing areas.
Duration Of Freeze Thaw-Cycle (Seconds): 10-15
Duration Of Freeze Thaw-Cycle (Seconds): 10
Number Of Freeze-Thaw Cycles: 2 freeze-thaw cycles

## 2025-02-06 NOTE — PROCEDURE: ADDITIONAL NOTES
Additional Notes: Recommended taking Zyrtec 20mg. 
Detail Level: Simple
Render Risk Assessment In Note?: no

## 2025-02-06 NOTE — PROCEDURE: DIAGNOSIS COMMENT
Detail Level: Simple
Comment: Injection site reaction to vaccines, but done 1 week ago - instructed to monitor closely, go to ED if redness rapidly progressing proximally
Render Risk Assessment In Note?: no

## 2025-02-26 ENCOUNTER — HOSPITAL ENCOUNTER (OUTPATIENT)
Dept: LAB | Facility: MEDICAL CENTER | Age: 75
End: 2025-02-26
Payer: MEDICARE

## 2025-02-26 DIAGNOSIS — M1A.9XX1 CHRONIC GOUT INVOLVING TOE WITH TOPHUS, UNSPECIFIED CAUSE, UNSPECIFIED LATERALITY: Chronic | ICD-10-CM

## 2025-02-26 DIAGNOSIS — E78.2 MIXED HYPERLIPIDEMIA: ICD-10-CM

## 2025-02-26 DIAGNOSIS — R53.83 OTHER FATIGUE: ICD-10-CM

## 2025-02-26 DIAGNOSIS — I10 ESSENTIAL HYPERTENSION: Chronic | ICD-10-CM

## 2025-02-26 LAB
25(OH)D3 SERPL-MCNC: 42 NG/ML (ref 30–100)
ALBUMIN SERPL BCP-MCNC: 3.9 G/DL (ref 3.2–4.9)
ALBUMIN/GLOB SERPL: 1.4 G/DL
ALP SERPL-CCNC: 65 U/L (ref 30–99)
ALT SERPL-CCNC: 22 U/L (ref 2–50)
ANION GAP SERPL CALC-SCNC: 11 MMOL/L (ref 7–16)
AST SERPL-CCNC: 26 U/L (ref 12–45)
BASOPHILS # BLD AUTO: 0.6 % (ref 0–1.8)
BASOPHILS # BLD: 0.03 K/UL (ref 0–0.12)
BILIRUB SERPL-MCNC: 1.4 MG/DL (ref 0.1–1.5)
BUN SERPL-MCNC: 16 MG/DL (ref 8–22)
CALCIUM ALBUM COR SERPL-MCNC: 9.7 MG/DL (ref 8.5–10.5)
CALCIUM SERPL-MCNC: 9.6 MG/DL (ref 8.5–10.5)
CHLORIDE SERPL-SCNC: 103 MMOL/L (ref 96–112)
CHOLEST SERPL-MCNC: 154 MG/DL (ref 100–199)
CO2 SERPL-SCNC: 27 MMOL/L (ref 20–33)
CORTIS SERPL-MCNC: 9.7 UG/DL (ref 0–23)
CREAT SERPL-MCNC: 0.84 MG/DL (ref 0.5–1.4)
CREAT UR-MCNC: 103 MG/DL
EOSINOPHIL # BLD AUTO: 0.18 K/UL (ref 0–0.51)
EOSINOPHIL NFR BLD: 3.4 % (ref 0–6.9)
ERYTHROCYTE [DISTWIDTH] IN BLOOD BY AUTOMATED COUNT: 50.5 FL (ref 35.9–50)
FOLATE SERPL-MCNC: 37.2 NG/ML
GFR SERPLBLD CREATININE-BSD FMLA CKD-EPI: 72 ML/MIN/1.73 M 2
GLOBULIN SER CALC-MCNC: 2.8 G/DL (ref 1.9–3.5)
GLUCOSE SERPL-MCNC: 96 MG/DL (ref 65–99)
HCT VFR BLD AUTO: 45.9 % (ref 37–47)
HDLC SERPL-MCNC: 43 MG/DL
HGB BLD-MCNC: 15 G/DL (ref 12–16)
IMM GRANULOCYTES # BLD AUTO: 0 K/UL (ref 0–0.11)
IMM GRANULOCYTES NFR BLD AUTO: 0 % (ref 0–0.9)
LDLC SERPL CALC-MCNC: 78 MG/DL
LYMPHOCYTES # BLD AUTO: 1.37 K/UL (ref 1–4.8)
LYMPHOCYTES NFR BLD: 25.6 % (ref 22–41)
MCH RBC QN AUTO: 32.5 PG (ref 27–33)
MCHC RBC AUTO-ENTMCNC: 32.7 G/DL (ref 32.2–35.5)
MCV RBC AUTO: 99.6 FL (ref 81.4–97.8)
MICROALBUMIN UR-MCNC: <1.2 MG/DL
MICROALBUMIN/CREAT UR: NORMAL MG/G (ref 0–30)
MONOCYTES # BLD AUTO: 0.34 K/UL (ref 0–0.85)
MONOCYTES NFR BLD AUTO: 6.3 % (ref 0–13.4)
NEUTROPHILS # BLD AUTO: 3.44 K/UL (ref 1.82–7.42)
NEUTROPHILS NFR BLD: 64.1 % (ref 44–72)
NRBC # BLD AUTO: 0 K/UL
NRBC BLD-RTO: 0 /100 WBC (ref 0–0.2)
PLATELET # BLD AUTO: 209 K/UL (ref 164–446)
PMV BLD AUTO: 10.6 FL (ref 9–12.9)
POTASSIUM SERPL-SCNC: 3.7 MMOL/L (ref 3.6–5.5)
PROT SERPL-MCNC: 6.7 G/DL (ref 6–8.2)
RBC # BLD AUTO: 4.61 M/UL (ref 4.2–5.4)
SODIUM SERPL-SCNC: 141 MMOL/L (ref 135–145)
T4 FREE SERPL-MCNC: 1.15 NG/DL (ref 0.93–1.7)
TRIGL SERPL-MCNC: 163 MG/DL (ref 0–149)
TSH SERPL-ACNC: 0.91 UIU/ML (ref 0.35–5.5)
URATE SERPL-MCNC: 7.4 MG/DL (ref 1.9–8.2)
VIT B12 SERPL-MCNC: 1171 PG/ML (ref 211–911)
WBC # BLD AUTO: 5.4 K/UL (ref 4.8–10.8)

## 2025-02-26 PROCEDURE — 84439 ASSAY OF FREE THYROXINE: CPT

## 2025-02-26 PROCEDURE — 82607 VITAMIN B-12: CPT

## 2025-02-26 PROCEDURE — 82306 VITAMIN D 25 HYDROXY: CPT | Mod: GA

## 2025-02-26 PROCEDURE — 82570 ASSAY OF URINE CREATININE: CPT

## 2025-02-26 PROCEDURE — 82533 TOTAL CORTISOL: CPT

## 2025-02-26 PROCEDURE — 84443 ASSAY THYROID STIM HORMONE: CPT

## 2025-02-26 PROCEDURE — 36415 COLL VENOUS BLD VENIPUNCTURE: CPT

## 2025-02-26 PROCEDURE — 80053 COMPREHEN METABOLIC PANEL: CPT

## 2025-02-26 PROCEDURE — 82746 ASSAY OF FOLIC ACID SERUM: CPT

## 2025-02-26 PROCEDURE — 85025 COMPLETE CBC W/AUTO DIFF WBC: CPT

## 2025-02-26 PROCEDURE — 80061 LIPID PANEL: CPT

## 2025-02-26 PROCEDURE — 84550 ASSAY OF BLOOD/URIC ACID: CPT

## 2025-02-26 PROCEDURE — 82043 UR ALBUMIN QUANTITATIVE: CPT

## 2025-04-26 ENCOUNTER — APPOINTMENT (OUTPATIENT)
Dept: RADIOLOGY | Facility: IMAGING CENTER | Age: 75
End: 2025-04-26
Payer: MEDICARE

## 2025-04-26 ENCOUNTER — OFFICE VISIT (OUTPATIENT)
Dept: URGENT CARE | Facility: PHYSICIAN GROUP | Age: 75
End: 2025-04-26
Payer: MEDICARE

## 2025-04-26 VITALS
TEMPERATURE: 99.9 F | WEIGHT: 216 LBS | SYSTOLIC BLOOD PRESSURE: 124 MMHG | RESPIRATION RATE: 16 BRPM | BODY MASS INDEX: 35.94 KG/M2 | HEART RATE: 105 BPM | OXYGEN SATURATION: 92 % | DIASTOLIC BLOOD PRESSURE: 74 MMHG

## 2025-04-26 DIAGNOSIS — R05.1 ACUTE COUGH: ICD-10-CM

## 2025-04-26 DIAGNOSIS — R68.89 FLU-LIKE SYMPTOMS: ICD-10-CM

## 2025-04-26 DIAGNOSIS — R06.2 WHEEZING: ICD-10-CM

## 2025-04-26 LAB
FLUAV RNA SPEC QL NAA+PROBE: NEGATIVE
FLUBV RNA SPEC QL NAA+PROBE: NEGATIVE
RSV RNA SPEC QL NAA+PROBE: NEGATIVE
SARS-COV-2 RNA RESP QL NAA+PROBE: NEGATIVE

## 2025-04-26 PROCEDURE — 71046 X-RAY EXAM CHEST 2 VIEWS: CPT | Mod: TC | Performed by: RADIOLOGY

## 2025-04-26 RX ORDER — IPRATROPIUM BROMIDE AND ALBUTEROL SULFATE 2.5; .5 MG/3ML; MG/3ML
3 SOLUTION RESPIRATORY (INHALATION) ONCE
Status: COMPLETED | OUTPATIENT
Start: 2025-04-26 | End: 2025-04-26

## 2025-04-26 RX ORDER — METHYLPREDNISOLONE 4 MG/1
TABLET ORAL
Qty: 21 TABLET | Refills: 0 | Status: SHIPPED | OUTPATIENT
Start: 2025-04-26

## 2025-04-26 RX ADMIN — IPRATROPIUM BROMIDE AND ALBUTEROL SULFATE 3 ML: 2.5; .5 SOLUTION RESPIRATORY (INHALATION) at 09:55

## 2025-04-26 ASSESSMENT — ENCOUNTER SYMPTOMS
WHEEZING: 1
FEVER: 0
COUGH: 1
CHILLS: 0

## 2025-04-26 ASSESSMENT — FIBROSIS 4 INDEX: FIB4 SCORE: 1.99

## 2025-04-26 NOTE — PROGRESS NOTES
CHIEF COMPLAINT  Chief Complaint   Patient presents with    Flu Like Symptoms     Exposed to positive Flu A, x3days sinus congestion, cough, productive cough, low fever     Subjective:   Fiona Daniels is a 74 y.o. female who presents to urgent care with concerns for cold/flulike symptoms.  Patient reports experiencing symptoms of cough, sinus congestion, low-grade fever and mild shortness of breath x 3 days.  She does report a pertinent history of asthma and has been requiring more frequent use of albuterol inhaler.  Patient states concern as she was exposed to influenza A earlier this month.  He also reports that her  is immunocompromised and is worried about alternant virus causing her symptoms today.      Review of Systems   Constitutional:  Positive for malaise/fatigue. Negative for chills and fever.   HENT:  Positive for congestion.    Respiratory:  Positive for cough and wheezing.        PAST MEDICAL HISTORY  Patient Active Problem List    Diagnosis Date Noted    Right carpal tunnel syndrome 02/03/2025    Lateral epicondylitis of right elbow 02/03/2025    Other fatigue 02/03/2025    Hallux valgus of left foot 08/03/2024    Chronic gout involving toe with tophus 08/01/2024    Caregiver role strain 01/25/2024    Primary osteoarthritis of left knee 07/31/2023    Vaccine counseling 07/14/2023    Preventative health care 07/14/2023    Pain in left foot 07/14/2023    Pulmonary nodules 08/25/2022    Atherosclerosis of both carotid arteries 12/06/2021    Carcinoma of upper-outer quadrant of left breast in female, estrogen receptor positive (HCC) 08/05/2020    Obesity (BMI 30-39.9) 07/21/2020    Mild asthma 07/21/2020    History of sinus tachycardia 10/23/2019    Hypokalemia 04/10/2012    Essential hypertension 04/10/2012    Mixed hyperlipidemia 04/10/2012       SURGICAL HISTORY   has a past surgical history that includes other orthopedic surgery; ethmoidectomy (02/14/2012); nasal polypectomy (02/14/2012);  "nasal septal reconstruction (02/14/2012); other (2014); breast reduction (Right, 07/21/2020); partial mastectomy (Left, 07/21/2020); node biopsy sentinel (Left, 07/21/2020); axillary node dissection (Left, 07/21/2020); breast reconstruction (Left, 07/21/2020); total hip arthroplasty (Right, 09/30/2021); hip replacement, total; sinuscope; fusion big toe,mt-p jt (Right, 09/14/2022); excision tumor soft tissue foot/toe subq * (Right, 09/14/2022); explor tarsal/tarsometatar jt (Right, 12/19/2022); total knee arthroplasty (Left, 09/28/2023); radiation therapy plan simple; and lumpectomy.    ALLERGIES  Allergies   Allergen Reactions    Aspirin Anaphylaxis    Robitussin Dm [Guiatuss Dm] Anaphylaxis    Codeine      \"passed out\"    Moxifloxacin Hcl In Nacl Rash       CURRENT MEDICATIONS  Home Medications       Reviewed by Agustin Linton Ass't (Medical Assistant) on 04/26/25 at 0928  Med List Status: <None>     Medication Last Dose Status   albuterol (VENTOLIN OR PROVENTIL) 108 (90 BASE) MCG/ACT AERS Taking Active   alendronate (FOSAMAX) 35 MG tablet Taking Active   anastrozole (ARIMIDEX) 1 MG Tab Taking Active   atorvastatin (LIPITOR) 10 MG Tab Taking Active   colchicine (COLCRYS) 0.6 MG Tab PRN Active   fluticasone-salmeterol (ADVAIR DISKUS) 100-50 MCG/ACT AEROSOL POWDER, BREATH ACTIVATED Taking Active   hydrochlorothiazide (MICROZIDE) 12.5 MG capsule Taking Active   methylPREDNISolone (MEDROL DOSEPAK) 4 MG Tablet Therapy Pack Not Taking Flagged for Removal   trandolapril (MAVIK) 4 MG Tab Taking Active                    SOCIAL HISTORY  Social History     Tobacco Use    Smoking status: Never     Passive exposure: Never    Smokeless tobacco: Never   Vaping Use    Vaping status: Never Used   Substance and Sexual Activity    Alcohol use: No    Drug use: No    Sexual activity: Not Currently     Partners: Male     Birth control/protection: None       FAMILY HISTORY  Family History   Problem Relation Age of Onset    " Diabetes Mother     Cancer Mother         Rectal Cancer    Hypertension Mother     Colon Cancer Mother     Heart Disease Father     Heart Attack Father          Medications, Allergies, and current problem list reviewed today in Epic.     Objective:     /74 (BP Location: Right arm, Patient Position: Sitting, BP Cuff Size: Adult)   Pulse (!) 105   Temp 37.7 °C (99.9 °F) (Temporal)   Resp 16   Wt 98 kg (216 lb)   SpO2 92%     Physical Exam  Vitals reviewed.   Constitutional:       General: She is not in acute distress.     Appearance: She is not ill-appearing or toxic-appearing.   HENT:      Head: Normocephalic.      Nose: Congestion present.      Mouth/Throat:      Mouth: Mucous membranes are moist.      Pharynx: Oropharynx is clear.   Cardiovascular:      Rate and Rhythm: Normal rate.      Pulses: Normal pulses.   Pulmonary:      Effort: Pulmonary effort is normal. No respiratory distress.      Breath sounds: No stridor. No wheezing, rhonchi or rales.   Musculoskeletal:      Cervical back: Neck supple.   Skin:     General: Skin is warm.      Capillary Refill: Capillary refill takes less than 2 seconds.   Neurological:      General: No focal deficit present.      Mental Status: She is alert.   Psychiatric:         Mood and Affect: Mood normal.       RADIOLOGY RESULTS   DX-CHEST-2 VIEWS  Result Date: 4/26/2025 4/26/2025 9:40 AM HISTORY/REASON FOR EXAM:  Cough. TECHNIQUE/EXAM DESCRIPTION AND NUMBER OF VIEWS: Two views of the chest. COMPARISON:  2/14/2020 x-ray, 2/23/2022 chest CT and additional FINDINGS: Cardiac silhouette is normal in size.  No airspace infiltrate, edema, or pleural effusion.     No acute process.          Assessment/Plan:     Diagnosis and associated orders:     1. Wheezing  ipratropium-albuterol (DUONEB) nebulizer solution      2. Acute cough  DX-CHEST-2 VIEWS    POCT CoV-2, Flu A/B, RSV by PCR      3. Flu-like symptoms           Comments/MDM:     Patient reports urgent care with 3-day  history of cold and flulike symptoms.  Does report exposure to influenza A.  Does endorse mild shortness of breath.  History of asthma.  Taking albuterol as needed.  On physical exam patient is alert no apparent signs of distress.  She is nontoxic-appearing.  Scattered wheezing to upper respiratory fields.  Post auscultation after DuoNeb reveals improved airflow as well as subjective improvement.  Chest x-ray reveals no acute cardiopulmonary abnormality.  Continue with OTC and supportive measures.  Medrol Dosepak sent in preferred pharmacy for treatment of acute wheezing likely secondary to asthma.  Continue with albuterol inhaler as needed.  Discussed strict return precautions including any concerning or worsening symptoms.  Patient comfortable with plan.         Differential diagnosis, natural history, supportive care, and indications for immediate follow-up discussed.    Advised the patient to follow-up with the primary care physician for recheck, reevaluation, and consideration of further management.    Please note that this dictation was created using voice recognition software. I have made a reasonable attempt to correct obvious errors, but I expect that there are errors of grammar and possibly content that I did not discover before finalizing the note.    This note was electronically signed by ERICKA Verma

## 2025-06-02 ENCOUNTER — HOSPITAL ENCOUNTER (OUTPATIENT)
Facility: MEDICAL CENTER | Age: 75
End: 2025-06-02
Attending: INTERNAL MEDICINE
Payer: MEDICARE

## 2025-06-02 DIAGNOSIS — Z12.31 ENCOUNTER FOR MAMMOGRAM TO ESTABLISH BASELINE MAMMOGRAM: ICD-10-CM

## 2025-06-02 PROCEDURE — 77063 BREAST TOMOSYNTHESIS BI: CPT

## 2025-06-25 ENCOUNTER — HOSPITAL ENCOUNTER (OUTPATIENT)
Dept: RADIOLOGY | Facility: MEDICAL CENTER | Age: 75
End: 2025-06-25
Attending: INTERNAL MEDICINE
Payer: MEDICARE

## 2025-06-25 DIAGNOSIS — R92.30 INCONCLUSIVE MAMMOGRAPHY DUE TO DENSE BREASTS: ICD-10-CM

## 2025-06-25 DIAGNOSIS — R92.2 INCONCLUSIVE MAMMOGRAPHY DUE TO DENSE BREASTS: ICD-10-CM

## 2025-06-25 PROCEDURE — 76641 ULTRASOUND BREAST COMPLETE: CPT

## 2025-07-02 PROBLEM — G56.02 LEFT CARPAL TUNNEL SYNDROME: Status: ACTIVE | Noted: 2025-07-02

## 2025-07-07 PROBLEM — G56.01 CARPAL TUNNEL SYNDROME ON RIGHT: Status: ACTIVE | Noted: 2025-07-02

## 2025-07-11 ENCOUNTER — OFFICE VISIT (OUTPATIENT)
Dept: URGENT CARE | Facility: PHYSICIAN GROUP | Age: 75
End: 2025-07-11
Payer: MEDICARE

## 2025-07-11 VITALS
RESPIRATION RATE: 14 BRPM | HEIGHT: 66 IN | OXYGEN SATURATION: 91 % | BODY MASS INDEX: 35.68 KG/M2 | WEIGHT: 222 LBS | DIASTOLIC BLOOD PRESSURE: 70 MMHG | HEART RATE: 85 BPM | SYSTOLIC BLOOD PRESSURE: 140 MMHG | TEMPERATURE: 98.1 F

## 2025-07-11 DIAGNOSIS — B96.89 ACUTE BACTERIAL SINUSITIS: Primary | ICD-10-CM

## 2025-07-11 DIAGNOSIS — J01.90 ACUTE BACTERIAL SINUSITIS: Primary | ICD-10-CM

## 2025-07-11 PROCEDURE — 3078F DIAST BP <80 MM HG: CPT

## 2025-07-11 PROCEDURE — 3077F SYST BP >= 140 MM HG: CPT

## 2025-07-11 PROCEDURE — 99213 OFFICE O/P EST LOW 20 MIN: CPT

## 2025-07-11 ASSESSMENT — ENCOUNTER SYMPTOMS
SINUS PAIN: 1
FEVER: 0
CHILLS: 0

## 2025-07-11 ASSESSMENT — FIBROSIS 4 INDEX: FIB4 SCORE: 2.02

## 2025-07-11 NOTE — PROGRESS NOTES
CHIEF COMPLAINT  Chief Complaint   Patient presents with    Sinus Problem     X 3 days, runny nose, sinus pressure,      Subjective:   Fiona Daniels is a 75 y.o. female who presents to urgent care with concerns for worsening symptoms of sinus pressure and congestion over the last 3 days.  Patient reports that for approximately 1 week she has been experiencing symptoms of sinus congestion that she has been attempting to alleviate with OTC antihistamines as well as Nasacort rinses.  She reports noting this morning worsening symptoms of sinus pressure as well as mild headaches to her right upper forehead and right cheek.  She denies any fevers or chills.  No chest congestion.  No other pertinent history.        Review of Systems   Constitutional:  Negative for chills and fever.   HENT:  Positive for congestion and sinus pain.        PAST MEDICAL HISTORY  Patient Active Problem List    Diagnosis Date Noted    Left carpal tunnel syndrome 07/02/2025    Carpal tunnel syndrome on right 07/02/2025    Right carpal tunnel syndrome 02/03/2025    Lateral epicondylitis of right elbow 02/03/2025    Other fatigue 02/03/2025    Hallux valgus of left foot 08/03/2024    Chronic gout involving toe with tophus 08/01/2024    Caregiver role strain 01/25/2024    Primary osteoarthritis of left knee 07/31/2023    Vaccine counseling 07/14/2023    Preventative health care 07/14/2023    Pain in left foot 07/14/2023    Pulmonary nodules 08/25/2022    Atherosclerosis of both carotid arteries 12/06/2021    Carcinoma of upper-outer quadrant of left breast in female, estrogen receptor positive (HCC) 08/05/2020    Obesity (BMI 30-39.9) 07/21/2020    Mild asthma 07/21/2020    History of sinus tachycardia 10/23/2019    Hypokalemia 04/10/2012    Essential hypertension 04/10/2012    Mixed hyperlipidemia 04/10/2012       SURGICAL HISTORY   has a past surgical history that includes other orthopedic surgery; ethmoidectomy (02/14/2012); nasal  polypectomy (02/14/2012); nasal septal reconstruction (02/14/2012); other (2014); breast reduction (Right, 07/21/2020); partial mastectomy (Left, 07/21/2020); node biopsy sentinel (Left, 07/21/2020); axillary node dissection (Left, 07/21/2020); breast reconstruction (Left, 07/21/2020); total hip arthroplasty (Right, 09/30/2021); hip replacement, total; sinuscope; fusion big toe,mt-p jt (Right, 09/14/2022); excision tumor soft tissue foot/toe subq * (Right, 09/14/2022); explor tarsal/tarsometatar jt (Right, 12/19/2022); total knee arthroplasty (Left, 09/28/2023); radiation therapy plan simple; and lumpectomy.    ALLERGIES  Allergies[1]    CURRENT MEDICATIONS  Home Medications       Reviewed by Lizette Benton Med Ass't (Medical Assistant) on 07/11/25 at 1105  Med List Status: <None>     Medication Last Dose Status   albuterol (VENTOLIN OR PROVENTIL) 108 (90 BASE) MCG/ACT AERS Taking Active   alendronate (FOSAMAX) 35 MG tablet Taking Active   anastrozole (ARIMIDEX) 1 MG Tab Taking Active   atorvastatin (LIPITOR) 10 MG Tab Taking Active   colchicine (COLCRYS) 0.6 MG Tab Taking Active   fluticasone-salmeterol (ADVAIR DISKUS) 100-50 MCG/ACT AEROSOL POWDER, BREATH ACTIVATED Taking Active   hydrochlorothiazide (MICROZIDE) 12.5 MG capsule Taking Active   metoprolol SR (TOPROL XL) 25 MG TABLET SR 24 HR Taking Active   trandolapril (MAVIK) 4 MG Tab Taking Active                    SOCIAL HISTORY  Social History     Tobacco Use    Smoking status: Never     Passive exposure: Never    Smokeless tobacco: Never   Vaping Use    Vaping status: Never Used   Substance and Sexual Activity    Alcohol use: No    Drug use: No    Sexual activity: Not Currently     Partners: Male     Birth control/protection: None       FAMILY HISTORY  Family History   Problem Relation Age of Onset    Diabetes Mother     Cancer Mother         Rectal Cancer    Hypertension Mother     Colon Cancer Mother     Heart Disease Father     Heart Attack Father   "        Medications, Allergies, and current problem list reviewed today in Epic.     Objective:     BP (!) 140/70 (BP Location: Right arm, Patient Position: Sitting, BP Cuff Size: Adult)   Pulse 85   Temp 36.7 °C (98.1 °F) (Temporal)   Resp 14   Ht 1.676 m (5' 6\")   Wt 101 kg (222 lb)   SpO2 91%     Physical Exam  Vitals reviewed.   Constitutional:       General: She is not in acute distress.     Appearance: Normal appearance. She is not ill-appearing or toxic-appearing.   HENT:      Head: Normocephalic.      Right Ear: Tympanic membrane normal.      Left Ear: Tympanic membrane normal.      Nose: Congestion present.      Right Sinus: Maxillary sinus tenderness and frontal sinus tenderness present.      Mouth/Throat:      Mouth: Mucous membranes are moist.      Pharynx: Oropharynx is clear. No posterior oropharyngeal erythema.   Cardiovascular:      Rate and Rhythm: Normal rate.   Pulmonary:      Effort: Pulmonary effort is normal.   Neurological:      General: No focal deficit present.      Mental Status: She is alert.   Psychiatric:         Mood and Affect: Mood normal.         Assessment/Plan:     Diagnosis and associated orders:     1. Acute bacterial sinusitis  amoxicillin-clavulanate (AUGMENTIN) 875-125 MG Tab         Comments/MDM:     Patient meets IDSA guidelines for ABRS given duration of symptoms, worsening severity, clinical history and physical exam  Consider antihistamine, nasal steroid, anti-inflammatory, and saline rinses  No evidence of venous sinus thrombosis or more serious etiology  Typically these infections display a slow resolution of symptoms starting at 48-72 hours of treatment.  Mild pressure and pain may continue at end of week of antibiotics which is normal and continue the other supportive care until back to baseline.          Differential diagnosis, natural history, supportive care, and indications for immediate follow-up discussed.    Advised the patient to follow-up with the " "primary care physician for recheck, reevaluation, and consideration of further management.    Please note that this dictation was created using voice recognition software. I have made a reasonable attempt to correct obvious errors, but I expect that there are errors of grammar and possibly content that I did not discover before finalizing the note.    This note was electronically signed by ERICKA Verma       [1]   Allergies  Allergen Reactions    Aspirin Anaphylaxis    Robitussin Dm [[facility] D[facility]] Anaphylaxis    Codeine      \"passed out\"    Moxifloxacin Hcl In Nacl Rash     "

## 2025-07-29 NOTE — NON-PROVIDER
Patient was seen today in clinic with Dr. See for Consult.  Vitals signs and weight were obtained and pain assessment was completed.  Allergies and medications were reviewed with the patient.  Review of systems completed.     Vitals/Pain:  Vitals:    08/06/20 0829   BP: 134/74   Pulse: 82   Temp: 36.6 °C (97.8 °F)   SpO2: 93%   Pain Score: No pain        Allergies:   Aspirin, Codeine, Moxifloxacin hcl in nacl, and Robitussin dm [guiatuss dm]    Current Medications:  Current Outpatient Medications   Medication Sig Dispense Refill   • metoprolol SR (TOPROL XL) 25 MG TABLET SR 24 HR Take 1 Tab by mouth every day. 90 Tab 2   • atorvastatin (LIPITOR) 20 MG Tab      • trandolapril (MAVIK) 4 MG Tab TK 2 T PO QD  0   • hydrochlorothiazide (HYDRODIURIL) 12.5 MG tablet Take 12.5 mg by mouth every day.     • albuterol (VENTOLIN OR PROVENTIL) 108 (90 BASE) MCG/ACT AERS Inhale 2 Puffs by mouth every four hours as needed for Shortness of Breath. 1 Inhaler 3   • fluticasone-salmeterol (ADVAIR DISKUS) 100-50 MCG/DOSE AEPB Inhale 1 Puff by mouth every day.     • Multiple Vitamins-Minerals (ONE-A-DAY 50 PLUS PO) Take  by mouth every day.     • Cholecalciferol (VITAMIN D3 PO) Take  by mouth every day.     • CALCIUM PO Take  by mouth every day. Pt does not know dose       No current facility-administered medications for this encounter.          PCP:  Marcos Rodríguez, Med Ass't     Please schedule this patient for a CPAP follow up in 1 year. Thank you

## (undated) DEVICE — PLUMEPEN ULTRA 3/8 IN X 10 FT HOSE (20EA/CA)

## (undated) DEVICE — KIT  I.V. START (100EA/CA)

## (undated) DEVICE — SET LEADWIRE 5 LEAD BEDSIDE DISPOSABLE ECG (1SET OF 5/EA)

## (undated) DEVICE — BOVIE BLADE COATED &INSULATED - 25/PK

## (undated) DEVICE — GLOVE SZ 7 BIOGEL PI MICRO - PF LF (50PR/BX 4BX/CA)

## (undated) DEVICE — CLIP MED INTNL HRZN TI ESCP - (25/BX)

## (undated) DEVICE — TOWELS CLOTH SURGICAL - (4/PK 20PK/CA)

## (undated) DEVICE — CATHETER IV 20 GA X 1-1/4 ---SURG.& SDS ONLY--- (50EA/BX)

## (undated) DEVICE — CHLORAPREP 26 ML APPLICATOR - ORANGE TINT(25/CA)

## (undated) DEVICE — GLOVE BIOGEL PI INDICATOR SZ 6.5 SURGICAL PF LF - (50/BX 4BX/CA)

## (undated) DEVICE — SODIUM CHL IRRIGATION 0.9% 1000ML (12EA/CA)

## (undated) DEVICE — KIT ANESTHESIA W/CIRCUIT & 3/LT BAG W/FILTER (20EA/CA)

## (undated) DEVICE — ELECTRODE 850 FOAM ADHESIVE - HYDROGEL RADIOTRNSPRNT (50/PK)

## (undated) DEVICE — BLADE SURGICAL #10 - (50/BX)

## (undated) DEVICE — GLOVE BIOGEL SZ 8 SURGICAL PF LTX - (50PR/BX 4BX/CA)

## (undated) DEVICE — SENSOR SPO2 NEO LNCS ADHESIVE (20/BX) SEE USER NOTES

## (undated) DEVICE — SUTURE 3-0 MONOCRYL PLUS PS-1 - 27 INCH (36/BX)

## (undated) DEVICE — ELECTRODE DUAL RETURN W/ CORD - (50/PK)

## (undated) DEVICE — PAD LAP STERILE 18 X 18 - (5/PK 40PK/CA)

## (undated) DEVICE — SET EXTENSION WITH 2 PORTS (48EA/CA) ***PART #2C8610 IS A SUBSTITUTE*****

## (undated) DEVICE — SUTURE GENERAL

## (undated) DEVICE — CANISTER SUCTION RIGID RED 1500CC (40EA/CA)

## (undated) DEVICE — GLOVE BIOGEL SZ 6 PF LATEX - (50EA/BX 4BX/CA)

## (undated) DEVICE — GLOVE BIOGEL INDICATOR SZ 6.5 SURGICAL PF LTX - (50PR/BX 4BX/CA)

## (undated) DEVICE — SUTURE 3-0 MONOCRYL PLUS PS-2 - (12/BX)

## (undated) DEVICE — DRAIN FLAT SUCTION 10MMX20CM - LATEX FREE (10EA/CA)

## (undated) DEVICE — TUBE CONNECTING SUCTION - CLEAR PLASTIC STERILE 72 IN (50EA/CA)

## (undated) DEVICE — GOWN WARMING STANDARD FLEX - (30/CA)

## (undated) DEVICE — SUTURE 2-0 VICRYL PLUS SH - 8 X 18 INCH (12/BX)

## (undated) DEVICE — TUBING CLEARLINK DUO-VENT - C-FLO (48EA/CA)

## (undated) DEVICE — RESERVOIR SUCTION 100 CC - SILICONE (20EA/CA)

## (undated) DEVICE — LACTATED RINGERS INJ 1000 ML - (14EA/CA 60CA/PF)

## (undated) DEVICE — PROTECTOR ULNA NERVE - (36PR/CA)

## (undated) DEVICE — CLOSURE SKIN STRIP 1/2 X 4 IN - (STERI STRIP) (50/BX 4BX/CA)

## (undated) DEVICE — SLEEVE, VASO, THIGH, MED

## (undated) DEVICE — MASK ANESTHESIA ADULT  - (100/CA)

## (undated) DEVICE — CANISTER SUCTION 3000ML MECHANICAL FILTER AUTO SHUTOFF MEDI-VAC NONSTERILE LF DISP  (40EA/CA)

## (undated) DEVICE — HEAD HOLDER JUNIOR/ADULT

## (undated) DEVICE — SHEET TRANSVERSE LAP - (12EA/CA)

## (undated) DEVICE — SUCTION INSTRUMENT YANKAUER BULBOUS TIP W/O VENT (50EA/CA)

## (undated) DEVICE — GLOVE BIOGEL PI INDICATOR SZ 7.5 SURGICAL PF LF -(50/BX 4BX/CA)

## (undated) DEVICE — SUTURE 3-0 VICRYL PLUS SH - 8X 18 INCH (12/BX)

## (undated) DEVICE — SUTURE 4-0 MONOCRYL PLUS PS-2 - 27 INCH (36/BX)

## (undated) DEVICE — BANDAGE ELASTIC STERILE MATRIX 6 X 10 (20EA/CA)

## (undated) DEVICE — PAD MAGNETIC INSTRUMENT FOAM HOLDER (200/CA)

## (undated) DEVICE — WATER IRRIGATION STERILE 1000ML (12EA/CA)

## (undated) DEVICE — SUTURE 3-0 ETHILON FS-1 - (36/BX) 30 INCH

## (undated) DEVICE — DRAPE MAGNETIC (INSTRA-MAG) - (30/CA)

## (undated) DEVICE — APPLIER OPEN MEDIUM CLIP (6EA/BX)